# Patient Record
Sex: MALE | Race: WHITE | NOT HISPANIC OR LATINO | Employment: OTHER | URBAN - METROPOLITAN AREA
[De-identification: names, ages, dates, MRNs, and addresses within clinical notes are randomized per-mention and may not be internally consistent; named-entity substitution may affect disease eponyms.]

---

## 2017-02-15 ENCOUNTER — TRANSFERRED RECORDS (OUTPATIENT)
Dept: HEALTH INFORMATION MANAGEMENT | Facility: CLINIC | Age: 62
End: 2017-02-15

## 2017-08-15 ENCOUNTER — TRANSFERRED RECORDS (OUTPATIENT)
Dept: HEALTH INFORMATION MANAGEMENT | Facility: CLINIC | Age: 62
End: 2017-08-15

## 2017-08-17 ENCOUNTER — PRE VISIT (OUTPATIENT)
Dept: OTOLARYNGOLOGY | Facility: CLINIC | Age: 62
End: 2017-08-17

## 2017-08-17 NOTE — TELEPHONE ENCOUNTER
1.  Date/reason for appt: 9/18/17 vocal cord issues  2.  Referring provider: EVANS BARILLAS   3.  Call to patient (Yes / No - short description): no, referral   4.  Previous care at / records requested from: [facility]   5.  Other: Records received from [facility].   Included  Office notes: 12/2/13, 7/1/15, 5/25/16, 2/15/17, 8/15/17  Op/Procedure notes: microlaryngoscopy 11/15/13, 8/28/15  Other: [facility] labs

## 2017-08-18 DIAGNOSIS — E78.5 HYPERLIPIDEMIA LDL GOAL <130: ICD-10-CM

## 2017-08-18 RX ORDER — ATORVASTATIN CALCIUM 20 MG/1
TABLET, FILM COATED ORAL
Qty: 90 TABLET | Refills: 0 | Status: SHIPPED | OUTPATIENT
Start: 2017-08-18 | End: 2018-07-18

## 2017-08-18 NOTE — TELEPHONE ENCOUNTER
Medication Detail      Disp Refills Start End JUAN   atorvastatin (LIPITOR) 20 MG tablet 90 tablet 2 11/28/2016  No   Sig: Take 1 tablet (20 mg) by mouth daily       Last Office Visit with [facility], [facility] or [facility] prescribing provider: 9/8/16  Next 5 appointments (look out 90 days)     Sep 22, 2017  8:00 AM CDT   PHYSICAL with Yoel Amaro MD   [facility]    [address] [place] [id number] [phone]                   Lab Results   Component Value Date    CHOL 156 09/08/2016     Lab Results   Component Value Date    HDL 63 09/08/2016     Lab Results   Component Value Date    LDL 78 09/08/2016     Lab Results   Component Value Date    TRIG 75 09/08/2016     Lab Results   Component Value Date    CHOLHDLRATIO 2.9 11/04/2015

## 2017-08-18 NOTE — TELEPHONE ENCOUNTER
Pt due for cholesterol check. Is scheduled for physical.    Short-term refill sent in to avoid lapse in treatment.    ANGELA SparrowN, RN  [facility] (formerly [facility])

## 2017-09-06 NOTE — TELEPHONE ENCOUNTER
Patient called in, he stated his records are at [facility]. He hasn't had any images taken recently and has had over 60 surgeries for vocal cord. Faxed request to [facility] for any additional records.

## 2017-09-07 NOTE — TELEPHONE ENCOUNTER
Additional Records received from [facility].   Included  Office notes: 10/12/11, 2012, 2013, 2014, 2015  Op/Procedure notes: direct microlaryngoscopy 10/29/03, 11/11/04, 3/29/06  Microlaryngoscopy 9/14/07  Pathology reports: 12/7/07, 8/11/98, 12/1/00,12/13/01, 4/23/03, 10/29/03, 11/11/04, 8/28/15, 9/8/15, 8/21/99

## 2017-09-18 ENCOUNTER — OFFICE VISIT (OUTPATIENT)
Dept: OTOLARYNGOLOGY | Facility: CLINIC | Age: 62
End: 2017-09-18

## 2017-09-18 VITALS — WEIGHT: 199 LBS | HEIGHT: 74 IN | BODY MASS INDEX: 25.54 KG/M2

## 2017-09-18 DIAGNOSIS — Z78.9 RECURRENT RESPIRATORY PAPILLOMATOSIS: Primary | ICD-10-CM

## 2017-09-18 DIAGNOSIS — Z78.9 RECURRENT RESPIRATORY PAPILLOMATOSIS: ICD-10-CM

## 2017-09-18 DIAGNOSIS — Q31.0 GLOTTIC WEB OF LARYNX: ICD-10-CM

## 2017-09-18 DIAGNOSIS — R49.0 DYSPHONIA: ICD-10-CM

## 2017-09-18 DIAGNOSIS — R49.0 DYSPHONIA: Primary | ICD-10-CM

## 2017-09-18 ASSESSMENT — PAIN SCALES - GENERAL: PAINLEVEL: NO PAIN (0)

## 2017-09-18 NOTE — PROGRESS NOTES
Dear Dr. Ronquillo:    I had the pleasure of meeting Cleveland Rodríguez in consultation at the [facility] of the [facility] at your request, for evaluation of chronic cough and throat concerns. The note from our visit follows.  I appreciate the opportunity to participate in the care of this pleasant patient.    Please feel free to contact me with any questions.    Sincerely yours,    Betty Mitchell M.D., M.P.H.  , Laryngology  Director, [facility]  Otolaryngology- Head & Neck Surgery  [phone]          =====    History of Present Illness:   Cleveland Rodríguez is a pleasant 61-year-old male who presents with a history of throat concerns. Symptoms include increased effort to talk/sing, mucus in throat, frequent throat-clearing, frequent cough, poor voice quality, voice breaks, change in vocal pitch, change in vocal volume, change in range and raspy voice.      Voice  He has had this voice problem for 58 years by his report. The voice symptoms are variable and unpredictable, and worse with stress. The voice is getting worse over time. His routine vocal demand is extensive.    He is status post a visit to the operating room for bilateral vocal fold lesion removal in August of 2015 with endoscopic shaver and inferiorly based microflap reconstruction bilaterally. Pathology was negative for high grade dysplasia.  HPV type not known.  He has no prior HPV vaccine.  He has undergone greater than 60 prior procedures in the past. He has previously reported hoarseness and heavy night breathing.  When seen most recently by Dr. Ronquillo, he was noted to have bilateral papilloma as well as small to medium web /scar tissue.  He has had no prior speech therapy.     His current voice quality is as low as it has ever gotten. He describes himself as a social person, and when his voice is bad he talks less. He works as a  rodríguez.      Swallowing  No concerns.      Cough/Throat-clearing  He has also had a chronic cough/throat clear over the past six months or so.  This is unpredictable and feels 0% controllable. Triggers include talking, laughing, eating and lying down.  There is no preceding tickle.  Most of the time it is a dry cough.  He finds the symptoms annoying. He is taking no new medications associated with cough onset.      Breathing  he has had some breathing problems during sleep for the past six years or so.  He attributes this to the papilloma as well, and notes that it occurs without exertion.   He notes expiratory noise, but no stridor.    He had a sleep study two years ago, which was borderline negative by his report.  His wife reports he is a very loud sleeper, although she does not hear pauses.      Throat discomfort  No concerns.       Reflux-type symptoms  He experiences heartburn/indigestion weekly to monthly. He is not taking reflux medications.      Prior outside records were reviewed for this visit. He also has a history of uveitis which has been managed with local steroids. He has had some chest pain in the past that he states is not heartburn. I has been evaluated in the past and was not felt to be cardiac.    MEDICATIONS:     Current Outpatient Prescriptions   Medication Sig Dispense Refill     atorvastatin (LIPITOR) 20 MG tablet TAKE 1 TABLET BY MOUTH DAILY 90 tablet 0     aspirin 81 MG tablet Take 1 tablet by mouth daily        metroNIDAZOLE (METROGEL) 1 % gel Apply 1 g topically daily apply hs for Rosacea 60 g 11     Multiple Vitamin (DAILY MULTIVITAMIN PO) Take  by mouth 2 times daily.         ALLERGIES:    Allergies   Allergen Reactions     Amoxicillin      Rash         PAST MEDICAL HISTORY:   Past Medical History:   Diagnosis Date     Other and unspecified hyperlipidemia      Polyp of vocal cord or larynx      Rosacea      Uveitis         PAST SURGICAL HISTORY:   Past Surgical History:   Procedure  Laterality Date     CATARACT IOL, RT/LT      right eye only     COLONOSCOPY  11/4/2013    Procedure: COLONOSCOPY;  COLONOSCOPY;  Surgeon: Sawyer Niño MD;  Location:  GI     SURGICAL HISTORY OF -       about 55 vocal cord surgeries       HABITS/SOCIAL HISTORY:    Social History   Substance Use Topics     Smoking status: Never Smoker     Smokeless tobacco: Never Used     Alcohol use Yes      Comment: social         FAMILY HISTORY:    Family History   Problem Relation Age of Onset     HEART DISEASE Father 46       REVIEW OF SYSTEMS:  The patient completed a comprehensive 11 point review of systems (below), which was reviewed. Positives are as noted below; pertinent findings are as noted in the HPI.     Patient Supplied Answers to Review of Systems  No flowsheet data found.    PHYSICAL EXAMINATION:  General: The patient was alert and conversant, and in no acute distress. Patient accompanied by his spouse.  Eyes: PERRL, conjunctiva and lids normal, sclera nonicteric.  Nose: Anterior rhinoscopy: no gross abnormalities. no  bleeding; no  mucopurulence; septum grossly normal, mild mucoid drainage and/or crusting.  Oral cavity/oropharynx: No masses or lesions. Dentition in fair condition. Floor of mouth and oral tongue soft to palpation. Tongue mobility and palate elevation intact and symmetric.  Ears: Normal auricles, external auditory canals bilaterally. Visualized portions of tympanic membranes normal bilaterally. Bilateral moderate cerumen.  Neck: No palpable cervical lymphadenopathy. There was no significant tenderness to palpation of the thyrohyoid space, which was mildly narrow. No obvious thyroid abnormality. Landmarks palpable.  Resp: Breathing comfortably, no stridor or stertor.  Neuro: Symmetric facial function. Other cranial nerves as documented above.  Psych: Normal affect, pleasant and cooperative.  Voice/speech: Moderate dysphonia characterized by breathiness, roughness and strain.  Extremities: No  cyanosis, clubbing, or edema of the upper extremities.      Intake scores  Total Score for Last Patient-Answered VHI Questionnaire  VHI Total Score 9/18/2017   VHI Total Score 25     Total Score for Last Patient-Answered EAT Questionnaire  No flowsheet data found.    Total Score for Last Patient-Answered CSI Questionnaire  No flowsheet data found.      PROCEDURE:   Flexible fiberoptic laryngoscopy and laryngovideostroboscopy  Indications: This procedure was warranted to evaluate the patient's laryngeal anatomy and function. Risks, benefits, and alternatives were discussed.  Description: After written informed consent was obtained, a time-out was performed to confirm patient identity, procedure, and procedure site. Topical 3% lidocaine with 0.25% phenylephrine was applied to the nasal cavities. I performed the endoscopy and no complications were apparent. Continuous and stroboscopic light were utilized to assess routine phonation and variable frequency phonation.  Performed by: Betty Mitchell MD MPH  SLP: Ermias Meza MM, MA, CCC-SLP   Findings: Normal nasopharynx. Normal base of tongue, valleculae, and epiglottis. Vocal fold mobility: right: normal; left: normal. Medial edges of the vocal folds: left with diffuse clusters of papilloma along false and true vocal folds; right with superior/lateral papilloma, medial edge appeared clear. Small anterior infraglottic web.   Glissade produced appropriate elongation. There was moderate supraglottic recruitment with connected speech. Mucosa of false vocal folds, aryepiglottic folds, piriform sinuses, and posterior glottis unremarkable with the exception of scattered clusters of papillomas particularly on the false vocal folds. Mild right posterior glottic web. Airway was patent.   The addition of stroboscopy allowed evaluation of the mucosal wave.   Amplitude: right: severely decreased; left: mildly decreased. Symmetry: associated asymmetry. Closure pattern: complete  "and irregular. Closure plane: at glottic level. Phase distribution: normal.      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 We had a lengthy discussion today. We discussed that while in-clinic KTP laser treatment can be an option, his burden of disease is significant enough that he would benefit from formal treatment in the OR; at times these procedures do need to be staged. We also discussed that there is some evidence in the literature potentially supporting the utility of HPV vaccination in adults who already have RRP. The data are preliminary and the included number of patients is small, but the vaccine appears to be well tolerated and may be associated with longer intervals between procedures. He will look into this and let us know if he needs a letter of support.    He asked that I speak with Dr Ronquillo to clarify whether he should have surgery with me or continue with Dr Ronquillo. He wanted me to be sure to convey to him that he is very loyal to Dr Ronquillo. I mentioned that Dr Ronquillo has clearly done very well for him over the years, and I am also happy to help if he deems appropriate.    He will return as needed. I appreciate the opportunity to participate in the care of this patient.    "

## 2017-09-18 NOTE — PROGRESS NOTES
"[facility]  Bhavin Harvey Jr., M.D., F.A.C.S.  Betty Mitchell M.D., M.P.H.  Magy Guthrie, Ph.D., CCC/SLP  Nelli Mccloud M.M. (voice), M.JAYLEN., CCC/SLP  Segundo Meza M.M. (voice), MASHLEY., CCC/SLP    Patient: Cleveland Rodríguez  Date of Visit: 9/18/2017    CHIEF COMPLAINT: Voice changes, dyspnea    HISTORY  Cleveland Rodríguez was seen for initial voice evaluation and laryngeal examination in conjunction with a visit to Dr. Mitchell.  Please refer to Dr. Mitchell s dictation for a more complete history and impressions. Salient details of his history are as follows:    Mr. Rodríguez is a 61 year old gentleman with a longstanding history of recurrent respiratory papilloma, for which he estimates that he has had 60+ surgeries since the age of 3.    He has been working with Dr. Ronquillo and [facility] for quite some time, and has undergone several surgeries with good results. However, given the complexity and longstanding nature of his RRP Dr. Ronquillo encouraged the patient to come to our clinic as a second opinion on management.     Of note he has never worked with a speech pathologist on his voice prior to or following any surgery in the past    CURRENT SYMPTOMS INCLUDE  VOICE    Increased effort    Increased mucus in throat    Poor voice quality    Change in pitch    Reduced projection    Describes voice as \"raspy\"    COUGH/THROAT CLEARING    Onset ~6 months with no inciting incident or changes in medication    Frequent cough and throat clearing     Dry / minimally productive    Waxes and wanes    his cough/ throat clearing triggers include:  o Talking  o Laughing  o Eating  o Lying down    BREATHING    Worse when sleeping    Wife describes significant rattling noise, but no apneic periods    Sleep Study 2-3 years previous showed borderline apnea    Patient denies significant pain, voice, or heartburn.     OTHER PERTINENT HISTORY    Complex medical history: please also refer to Dr. Mitchell's dictation.   Past " "Medical History:   Diagnosis Date     Other and unspecified hyperlipidemia      Polyp of vocal cord or larynx      Rosacea      Uveitis      Past Surgical History:   Procedure Laterality Date     CATARACT IOL, RT/LT      right eye only     COLONOSCOPY  11/4/2013    Procedure: COLONOSCOPY;  COLONOSCOPY;  Surgeon: Sawyer Niño MD;  Location:  GI     SURGICAL HISTORY OF -       about 55 vocal cord surgeries       OBJECTIVE  PATIENT REPORTED MEASURES    Effort to talk: 8 / 10 (0-10 in which 10 represents maximal effort)    Voice quality: 2 / 10 (0-10 in which 10 represents best possible voice)     VPC mean: 1.5 /4 (  Patient Supplied Answers To VHI Questionnaire  Voice Handicap Index (VHI-10) 9/18/2017   How often do you have any of the following symptoms:  Indigestion, heartburn, chest pain, stomach acid coming up, and/or tasting acid in your mouth or throat? Monthly   (F1) My voice makes it difficult for people to hear me. Almost always   (F2) People have difficulty understanding me in a noisy room. Almost always   (F8) My voice difficulties restrict my personal and social life. Sometimes   (F9) I feel left out of conversations because of my voice. Sometimes   (F10) My voice problem causes me to lose income. Sometimes   (P5) I feel as though I have to strain to produce voice. Sometimes   (P6) The clarity of my voice is unpredictable. Almost always   (E4) My voice problem upsets me. Always   (E6) My voice makes me feel handicapped. Sometimes   (P3) People ask, \"What's wrong with your voice?\" Sometimes   VHI Total Score 25     PERCEPTUAL EVALUATION (CPT 90588)  POSTURE / TENSION:     no overt tension visible    BREATHING:     shallow    phonation is not coordinated with respiration     No respiratory noise noted    LARYNGEAL PALPATION:     no significant tenderness    VOICE:    Roughness: Moderate to severe    Breathiness: Mild Consistent    Strain: Mild to moderate Intermittent    Loudness    Conversational " speech:  Mildly reduced    Projected speech:  Moderate to severely reduced    Pitch:    Conversational speech:  Mildly lowered    Pitch glide: patient demonstrated significant difficulty effecting any volitional pitch change beyond a range of 4 semitones    Resonance:    Conversational speech:  laryngeal pharyngeal resonance    Singing vs. Speech:  Sustained phonation demonstrated reduced strain and mildly reduced roughness with mild increase in breathiness    CAPE-V Overall Severity:  63/100    COUGH/THROAT CLEARING:    Not observed    THERAPY PROBES: Improvement was elicited with coordination of respiration and phonation    LARYNGEAL EXAMINATION  Procedure: Flexible endoscopy with chip-tip technology with stroboscopy, left nostril; topical anesthesia with 3% Lidocaine and 0.25% phenylephrine was applied.   Performed by: Dr. Mitchell   The laryngeal and pharyngeal structures were evaluated for gross appearance, mobility, function, and focal lesions / abnormalities of the associated mucosa.  Stroboscopy was warranted to evaluate closure, symmetry, and vibratory characteristics of the vocal folds.  All findings were within normal limits with the exception of the following salient features:     Significant presence of papilloma completely obscuring the right true vocal fold and extending supraglotticly.     The posterior portion of the left membranous vocal fold is WNL, with a small anterior scar band, and cluster of papilloma on the left vocal process.    There is significant posterior commissure scarring with some evidence of papilloma recurrence subglottally    Marked 4 way constrictive hyperfunction noted during running speech which reduced with improved respiratory phonatory coordination.    Minimal elongation of the vocal folds was noted during pitch glide, but it is unclear if this is the result of limitations in physiology or limited experience with range exploration.    Stroboscopy demonstrated minimal  vibration of the right vocal fold, and mild to moderately reduced vibratory characteristics of the residual normal tissue of the LVF.      NBI of vocal folds    The laryngeal exam was reviewed with Mr. Rodríguez, and I provided pertinent explanations, as well as written and oral information.    ASSESSMENT / PLAN  IMPRESSIONS: Mr. Rodríguez has significant R49.0 (Dysphonia) in the context of longstanding Z78.9 (Recurrent Respiratory Papillomatosis) and an imbalance in function of the intrinsic and extrinsic muscles of the larynx.      Laryngeal evaluation demonstrated    Significant presence of papilloma completely obscuring the right true vocal fold and extending supraglotticly.     The posterior portion of the left membranous vocal fold is WNL, with a small anterior scar band, and cluster of papilloma on the left vocal process.    There is significant posterior commissure scarring with some evidence of papilloma recurrence subglottally    Marked 4 way constrictive hyperfunction noted during running speech which reduced with improved respiratory phonatory coordination.    Minimal elongation of the vocal folds was noted during pitch glide, but it is unclear if this is the result of limitations in physiology or limited experience with range exploration.    Dysphonia is accounted for by the stiffness of the vocal folds, and resulting poor mucosal wave     Dysphonia/discomfort is compounded by the hyperfunction of the intrinsic and extrinsic laryngeal musculature  in combination with poor phonatory respiratory coordination.  STIMULABILITY: results of therapy probes during perceptual and laryngeal evaluation demonstrate improvement with coordination of respiration and phonation  RECOMMENDATIONS:     A braxton discussion regarding options for intervention was had between surgeon, SLP, and patient / family.  The patient has elected to pursue OR excision of papilloma, and will consider the use of HPV vaccine.    A course of  speech therapy is recommended to optimize vocal technique, improve voice quality, promote reduced discomfort, effort and fatigue and optimize surgical results.    He demonstrates a Good prognosis for improved benefit from surgical intervention given adherence to therapeutic recommendations.     DURATION / FREQUENCY: one pre-operative one-hour sessions with four bi-weekly post-operative one-hour sessions, and 2 monthly one-hour follow-up sessions    GOALS:  Patient goal:   1. To improve and maintain a healthy voice quality  2. To understand the problem and fix it as much as possible    Short-term goal(s): Within the first 4 sessions, Mr. Rodríguez:  1. will demonstrate improved awareness of throat clearing / cough: acknowledging >75% of all cough events during session time with no clinician support  2. will be able to independently list key factors in maintenance of good vocal hygiene with 80% accuracy, and report on their use outside the therapy room.  3. will utilize silent inhalation with good low-respiratory engagement 75% of the time during therapy tasks with minimal clinician support  4. will accurately identify target vs. habitual voice quality during therapy tasks in 4 out of 5 trials with no clinician support  5. will demonstrate the ability to alternate between target and habitual voice quality given clinician cue 75% of the time during therapy tasks    Long-term goal(s): In 6 months, Mr. Rodríguez will:  1. Report a week of typical activities, in which dysphonia does not exceed a level of 3 out of 10, 80% of the time    This treatment plan was developed with the patient who agreed with the recommendations.    PRIMARY ICD-10 code:  R49.0 (Dysphonia)  SECONDARY ICD-10 code:  Z78.1 (Recurrent Respiratory Papillomatosis)     TOTAL SERVICE TIME: 60 minutes  EVALUATION OF VOICE AND RESONANCE: (95458): 60 minutes    NO CHARGE FACILITY FEE (59608)    Ermias Meza M.M., M.A., CCC-SLP  Speech-Language  Pathologist  Certificate of Vocology  [phone]

## 2017-09-18 NOTE — MR AVS SNAPSHOT
After Visit Summary   9/18/2017    Cleveland Rodríguez    MRN: [id number]           Patient Information     Date Of Birth          1955        Visit Information        Provider Department      9/18/2017 10:10 AM Betty Mitchell MD [facility]        Today's Diagnoses     Recurrent respiratory papillomatosis    -  1    Glottic web of larynx        Dysphonia           Follow-ups after your visit        Additional Services     OTOLARYNGOLOGY REFERRAL       SPEECH-LANGUAGE PATHOLOGY SERVICE(S) REQUESTED:  Evaluate and treat    Magy Guthrie, Ph.D., CCC/SLP  Speech-Language Pathologist  Director, [facility]  [phone]    Nelli Mccloud M.M., M.A., CCC/SLP  Speech-Language Pathologist  [facility]  [phone]                  Your next 10 appointments already scheduled     Oct 03, 2017  6:20 PM CDT   Pre-Op physical with Yoel Amaro MD   [facility]    [address] [place] [id number]   [phone]            Oct 06, 2017  8:00 AM CDT   PHYSICAL with Yoel Amaro MD   [facility] ([facility])    [address] [place] [id number]   [phone]            Oct 20, 2017  2:00 PM CDT   (Arrive by 1:45 PM)   Return Visit with JULISSA Morales   [facility] ([facility])    [address] [place] [id number] [phone]            Oct 24, 2017  5:00 PM CDT   (Arrive by 4:45 PM)   PAC PHONE RN ASSESSMENT with  Pac Rn   [facility] ([facility])    [address] [place] [id number] [phone]           Note: this is not an onsite visit; there is no need to come to the facility.            Oct 26, 2017   Procedure with Betty Mitchell MD   [facility] ([facility])    [address] [place] [address]  [address] [facility] [id number] [phone]           Located in the [facility] at [address] [place] [address], [place] [address].   parking is very convenient and highly recommended.  is a $6 flat rate fee.  Both  and self parkers should enter the main arrival plaza from [facility]; parking attendants will direct you based on your parking preference.            Nov 13, 2017  1:20 PM CST   (Arrive by 1:05 PM)   Return Visit with Betty Mitchell MD   [facility] ([facility] [place])    [address] [place] [address]  [address] [facility] [id number] [phone]           - This is a multidisciplinary care team visit with an ENT physician and may include a speech language pathologist. - It is important to know that if you are evaluated and/or treated by both a physician and a speech pathologist during your visit, your billing will reflect the input that you receive from both providers as separate professionals. Although most insurance plans do cover these services, we encourage you to contact your insurance company prior to your visit to determine whether there are any coverage limitations that might affect you financially. - Billing/procedure codes that are frequently associated with visits to our clinic include (but are not limited to) the ones listed below. Most patients will not need all of these items, but it may be useful to ask your insurance company's patient . 04956: Flexible fiberoptic laryngoscopy, 28732: Laryngoscopy; flexible or rigid fiberoptic, with stroboscopy, 98246: Flexible endoscopic evaluation of swallowing, 85933: Evaluation of Voice and Resonance, 92890: Speech pathology treatment for voice, speech, communication, 66173: Speech pathology treatment for swallowing/oral function for feeding - If you have any concerns or questions, or if you would prefer not to  "have a speech pathologist involved in your visit, please contact our Clinic Coordinator at [phone], at least 24 hours prior to your appointment.              Who to contact     Please call your clinic at [phone] to:    Ask questions about your health    Make or cancel appointments    Discuss your medicines    Learn about your test results    Speak to your doctor   If you have compliments or concerns about an experience at your clinic, or if you wish to file a complaint, please contact [facility] Patient Relations at [phone] or email us at [email or web address]         Additional Information About Your Visit        GnuBIOhart Information     Zuujit is an electronic gateway that provides easy, online access to your medical records. With Zuujit, you can request a clinic appointment, read your test results, renew a prescription or communicate with your care team.     To sign up for Zuujit visit the website at [email or web address]   You will be asked to enter the access code listed below, as well as some personal information. Please follow the directions to create your username and password.     Your access code is: [email or web address]  Expires: 12/3/2017  6:30 AM     Your access code will  in 90 days. If you need help or a new code, please contact your [facility] or call [phone] for assistance.        Care EveryWhere ID     This is your Care EveryWhere ID. This could be used by other organizations to access your [facility] medical records  [id number]        Your Vitals Were     Height BMI (Body Mass Index)                1.88 m (6' 2\") 25.55 kg/m2           Blood Pressure from Last 3 Encounters:   16 104/66   11/17/15 98/56   07/22/15 100/60    Weight from Last 3 Encounters:   17 90.3 kg (199 lb)   16 90.3 kg (199 lb)   11/17/15 96.6 kg (213 lb)              We Performed the Following     IMAGESTREAM " RECORDING ORDER     LARYNGOSCOPY FLEX/RIGID W STROBOSCOPY     OTOLARYNGOLOGY REFERRAL     Mounika-Operative Worksheet        Primary Care Provider Office Phone # Fax #    Yoel Amaro [phone] [address]  [facility] [address]        Equal Access to Services     CHARLOTTE BRUNER : Hadii aad ku hadasho Soomaali, waaxda luqadaha, qaybta kaalmada adeegyada, waxay idiin hayaan adeeg khcristela la'mary janen ah. So [facility] [phone].    ATENCIÓN: Si habla español, tiene a boles disposición servicios gratuitos de asistencia lingüística. [facility] [phone].    We comply with applicable federal civil rights laws and [place] laws. We do not discriminate on the basis of race, color, national origin, age, disability, sex, sexual orientation, or gender identity.            Thank you!     Thank you for choosing [facility]  for your care. Our goal is always to provide you with excellent care. Hearing back from our patients is one way we can continue to improve our services. Please take a few minutes to complete the written survey that you may receive in the mail after your visit with us. Thank you!             Your Updated Medication List - Protect others around you: Learn how to safely use, store and throw away your medicines at www.disposemymeds.org.          This list is accurate as of: 9/18/17 11:59 PM.  Always use your most recent med list.                   Brand Name Dispense Instructions for use Diagnosis    aspirin 81 MG tablet      Take 1 tablet by mouth daily        atorvastatin 20 MG tablet    LIPITOR    90 tablet    TAKE 1 TABLET BY MOUTH DAILY    Hyperlipidemia LDL goal <130       DAILY MULTIVITAMIN PO      Take  by mouth 2 times daily.    Routine general medical examination at a health care facility, Polyp of vocal cord or larynx, Degeneration of lumbar or lumbosacral intervertebral disc, Iritis, Hyperlipidemia LDL goal <130       metroNIDAZOLE 1 % gel    METROGEL    60 g    Apply 1 g  topically daily apply hs for Rosacea    Rosacea

## 2017-09-18 NOTE — LETTER
"9/18/2017       RE: Cleveland Rodríguez  [address] [place] [address]     Dear Colleague,    Thank you for referring your patient, Cleveland Rodríguez, to the [facility]. Please see a copy of my visit note below.    [facility]  Bhavin Harvey Jr., M.D., F.A.C.S.  Betty Mitchell M.D., M.P.H.  Magy Guthrie, Ph.D., CCC/SLP  Nelli Mccloud M.M. (voice), M.A., CCC/SLP  Segundo Meza M.M. (voice), M.A., CCC/SLP    Patient: Cleveland Rodríguez  Date of Visit: 9/18/2017    CHIEF COMPLAINT: Voice changes, dyspnea    HISTORY  Cleveland Rodríguez was seen for initial voice evaluation and laryngeal examination in conjunction with a visit to Dr. Mitchell.  Please refer to Dr. Mitchell s dictation for a more complete history and impressions. Salient details of his history are as follows:    Mr. Rodríguez is a 61 year old gentleman with a longstanding history of recurrent respiratory papilloma, for which he estimates that he has had 60+ surgeries since the age of 3.    He has been working with Dr. Ronquillo and [facility] for quite some time, and has undergone several surgeries with good results. However, given the complexity and longstanding nature of his RRP Dr. Ronquillo encouraged the patient to come to our clinic as a second opinion on management.     Of note he has never worked with a speech pathologist on his voice prior to or following any surgery in the past    CURRENT SYMPTOMS INCLUDE  VOICE    Increased effort    Increased mucus in throat    Poor voice quality    Change in pitch    Reduced projection    Describes voice as \"raspy\"    COUGH/THROAT CLEARING    Onset ~6 months with no inciting incident or changes in medication    Frequent cough and throat clearing     Dry / minimally productive    Waxes and wanes    his cough/ throat clearing triggers include:  o Talking  o Laughing  o Eating  o Lying down    BREATHING    Worse when sleeping    Wife describes " "significant rattling noise, but no apneic periods    Sleep Study 2-3 years previous showed borderline apnea    Patient denies significant pain, voice, or heartburn.     OTHER PERTINENT HISTORY    Complex medical history: please also refer to Dr. Mitchell's dictation.   Past Medical History:   Diagnosis Date     Other and unspecified hyperlipidemia      Polyp of vocal cord or larynx      Rosacea      Uveitis      Past Surgical History:   Procedure Laterality Date     CATARACT IOL, RT/LT      right eye only     COLONOSCOPY  11/4/2013    Procedure: COLONOSCOPY;  COLONOSCOPY;  Surgeon: Sawyer Niño MD;  Location:  GI     SURGICAL HISTORY OF -       about 55 vocal cord surgeries       OBJECTIVE  PATIENT REPORTED MEASURES    Effort to talk: 8 / 10 (0-10 in which 10 represents maximal effort)    Voice quality: 2 / 10 (0-10 in which 10 represents best possible voice)     VPC mean: 1.5 /4 (  Patient Supplied Answers To VHI Questionnaire  Voice Handicap Index (VHI-10) 9/18/2017   How often do you have any of the following symptoms:  Indigestion, heartburn, chest pain, stomach acid coming up, and/or tasting acid in your mouth or throat? Monthly   (F1) My voice makes it difficult for people to hear me. Almost always   (F2) People have difficulty understanding me in a noisy room. Almost always   (F8) My voice difficulties restrict my personal and social life. Sometimes   (F9) I feel left out of conversations because of my voice. Sometimes   (F10) My voice problem causes me to lose income. Sometimes   (P5) I feel as though I have to strain to produce voice. Sometimes   (P6) The clarity of my voice is unpredictable. Almost always   (E4) My voice problem upsets me. Always   (E6) My voice makes me feel handicapped. Sometimes   (P3) People ask, \"What's wrong with your voice?\" Sometimes   VHI Total Score 25     PERCEPTUAL EVALUATION (CPT 50371)  POSTURE / TENSION:     no overt tension visible    BREATHING: "     shallow    phonation is not coordinated with respiration     No respiratory noise noted    LARYNGEAL PALPATION:     no significant tenderness    VOICE:    Roughness: Moderate to severe    Breathiness: Mild Consistent    Strain: Mild to moderate Intermittent    Loudness    Conversational speech:  Mildly reduced    Projected speech:  Moderate to severely reduced    Pitch:    Conversational speech:  Mildly lowered    Pitch glide: patient demonstrated significant difficulty effecting any volitional pitch change beyond a range of 4 semitones    Resonance:    Conversational speech:  laryngeal pharyngeal resonance    Singing vs. Speech:  Sustained phonation demonstrated reduced strain and mildly reduced roughness with mild increase in breathiness    CAPE-V Overall Severity:  63/100    COUGH/THROAT CLEARING:    Not observed    THERAPY PROBES: Improvement was elicited with coordination of respiration and phonation    LARYNGEAL EXAMINATION  Procedure: Flexible endoscopy with chip-tip technology with stroboscopy, left nostril; topical anesthesia with 3% Lidocaine and 0.25% phenylephrine was applied.   Performed by: Dr. Mitchell   The laryngeal and pharyngeal structures were evaluated for gross appearance, mobility, function, and focal lesions / abnormalities of the associated mucosa.  Stroboscopy was warranted to evaluate closure, symmetry, and vibratory characteristics of the vocal folds.  All findings were within normal limits with the exception of the following salient features:     Significant presence of papilloma completely obscuring the right true vocal fold and extending supraglotticly.     The posterior portion of the left membranous vocal fold is WNL, with a small anterior scar band, and cluster of papilloma on the left vocal process.    There is significant posterior commissure scarring with some evidence of papilloma recurrence subglottally    Marked 4 way constrictive hyperfunction noted during running speech  which reduced with improved respiratory phonatory coordination.    Minimal elongation of the vocal folds was noted during pitch glide, but it is unclear if this is the result of limitations in physiology or limited experience with range exploration.    Stroboscopy demonstrated minimal vibration of the right vocal fold, and mild to moderately reduced vibratory characteristics of the residual normal tissue of the LVF.      NBI of vocal folds    The laryngeal exam was reviewed with Mr. Rodríguez, and I provided pertinent explanations, as well as written and oral information.    ASSESSMENT / PLAN  IMPRESSIONS: Mr. Rodríguez has significant R49.0 (Dysphonia) in the context of longstanding Z78.9 (Recurrent Respiratory Papillomatosis) and an imbalance in function of the intrinsic and extrinsic muscles of the larynx.      Laryngeal evaluation demonstrated    Significant presence of papilloma completely obscuring the right true vocal fold and extending supraglotticly.     The posterior portion of the left membranous vocal fold is WNL, with a small anterior scar band, and cluster of papilloma on the left vocal process.    There is significant posterior commissure scarring with some evidence of papilloma recurrence subglottally    Marked 4 way constrictive hyperfunction noted during running speech which reduced with improved respiratory phonatory coordination.    Minimal elongation of the vocal folds was noted during pitch glide, but it is unclear if this is the result of limitations in physiology or limited experience with range exploration.    Dysphonia is accounted for by the stiffness of the vocal folds, and resulting poor mucosal wave     Dysphonia/discomfort is compounded by the hyperfunction of the intrinsic and extrinsic laryngeal musculature  in combination with poor phonatory respiratory coordination.  STIMULABILITY: results of therapy probes during perceptual and laryngeal evaluation demonstrate improvement with  coordination of respiration and phonation  RECOMMENDATIONS:     A braxton discussion regarding options for intervention was had between surgeon, SLP, and patient / family.  The patient has elected to pursue OR excision of papilloma, and will consider the use of HPV vaccine.    A course of speech therapy is recommended to optimize vocal technique, improve voice quality, promote reduced discomfort, effort and fatigue and optimize surgical results.    He demonstrates a Good prognosis for improved benefit from surgical intervention given adherence to therapeutic recommendations.     DURATION / FREQUENCY: one pre-operative one-hour sessions with four bi-weekly post-operative one-hour sessions, and 2 monthly one-hour follow-up sessions    GOALS:  Patient goal:   1. To improve and maintain a healthy voice quality  2. To understand the problem and fix it as much as possible    Short-term goal(s): Within the first 4 sessions, Mr. Rodríguez:  1. will demonstrate improved awareness of throat clearing / cough: acknowledging >75% of all cough events during session time with no clinician support  2. will be able to independently list key factors in maintenance of good vocal hygiene with 80% accuracy, and report on their use outside the therapy room.  3. will utilize silent inhalation with good low-respiratory engagement 75% of the time during therapy tasks with minimal clinician support  4. will accurately identify target vs. habitual voice quality during therapy tasks in 4 out of 5 trials with no clinician support  5. will demonstrate the ability to alternate between target and habitual voice quality given clinician cue 75% of the time during therapy tasks    Long-term goal(s): In 6 months, Mr. Rodríguez will:  1. Report a week of typical activities, in which dysphonia does not exceed a level of 3 out of 10, 80% of the time    This treatment plan was developed with the patient who agreed with the recommendations.    PRIMARY ICD-10  code:  R49.0 (Dysphonia)  SECONDARY ICD-10 code:  Z78.1 (Recurrent Respiratory Papillomatosis)     TOTAL SERVICE TIME: 60 minutes  EVALUATION OF VOICE AND RESONANCE: (41616): 60 minutes    NO CHARGE FACILITY FEE (78828)    Ermias Meza M.M., M.A., CCC-SLP  Speech-Language Pathologist  Certificate of Vocology  [phone]

## 2017-09-18 NOTE — LETTER
9/18/2017      RE: Cleveland Rodríguez  [address] [place] [address]       Dear Dr. Ronquillo:    I had the pleasure of meeting Cleveland Rodríguez in consultation at the [facility] of the [facility] at your request, for evaluation of chronic cough and throat concerns. The note from our visit follows.  I appreciate the opportunity to participate in the care of this pleasant patient.    Please feel free to contact me with any questions.    Sincerely yours,    Betty Mitchell M.D., M.P.H.  , Laryngology  Director, [facility]  Otolaryngology- Head & Neck Surgery  [phone]          =====    History of Present Illness:   Cleveland Rodríguez is a pleasant 61-year-old male who presents with a history of throat concerns. Symptoms include increased effort to talk/sing, mucus in throat, frequent throat-clearing, frequent cough, poor voice quality, voice breaks, change in vocal pitch, change in vocal volume, change in range and raspy voice.      Voice  He has had this voice problem for 58 years by his report. The voice symptoms are variable and unpredictable, and worse with stress. The voice is getting worse over time. His routine vocal demand is extensive.    He is status post a visit to the operating room for bilateral vocal fold lesion removal in August of 2015 with endoscopic shaver and inferiorly based microflap reconstruction bilaterally. Pathology was negative for high grade dysplasia.  HPV type not known.  He has no prior HPV vaccine.  He has undergone greater than 60 prior procedures in the past. He has previously reported hoarseness and heavy night breathing.  When seen most recently by Dr. Ronquillo, he was noted to have bilateral papilloma as well as small to medium web /scar tissue.  He has had no prior speech therapy.     His current voice quality is as low as it has ever gotten. He describes himself as a social  person, and when his voice is bad he talks less. He works as a rodríguez.      Swallowing  No concerns.      Cough/Throat-clearing  He has also had a chronic cough/throat clear over the past six months or so.  This is unpredictable and feels 0% controllable. Triggers include talking, laughing, eating and lying down.  There is no preceding tickle.  Most of the time it is a dry cough.  He finds the symptoms annoying. He is taking no new medications associated with cough onset.      Breathing  he has had some breathing problems during sleep for the past six years or so.  He attributes this to the papilloma as well, and notes that it occurs without exertion.   He notes expiratory noise, but no stridor.    He had a sleep study two years ago, which was borderline negative by his report.  His wife reports he is a very loud sleeper, although she does not hear pauses.      Throat discomfort  No concerns.       Reflux-type symptoms  He experiences heartburn/indigestion weekly to monthly. He is not taking reflux medications.      Prior outside records were reviewed for this visit. He also has a history of uveitis which has been managed with local steroids. He has had some chest pain in the past that he states is not heartburn. I has been evaluated in the past and was not felt to be cardiac.    MEDICATIONS:     Current Outpatient Prescriptions   Medication Sig Dispense Refill     atorvastatin (LIPITOR) 20 MG tablet TAKE 1 TABLET BY MOUTH DAILY 90 tablet 0     aspirin 81 MG tablet Take 1 tablet by mouth daily        metroNIDAZOLE (METROGEL) 1 % gel Apply 1 g topically daily apply hs for Rosacea 60 g 11     Multiple Vitamin (DAILY MULTIVITAMIN PO) Take  by mouth 2 times daily.         ALLERGIES:    Allergies   Allergen Reactions     Amoxicillin      Rash         PAST MEDICAL HISTORY:   Past Medical History:   Diagnosis Date     Other and unspecified hyperlipidemia      Polyp of vocal cord or larynx      Rosacea      Uveitis          PAST SURGICAL HISTORY:   Past Surgical History:   Procedure Laterality Date     CATARACT IOL, RT/LT      right eye only     COLONOSCOPY  11/4/2013    Procedure: COLONOSCOPY;  COLONOSCOPY;  Surgeon: Sawyer Niño MD;  Location:  GI     SURGICAL HISTORY OF -       about 55 vocal cord surgeries       HABITS/SOCIAL HISTORY:    Social History   Substance Use Topics     Smoking status: Never Smoker     Smokeless tobacco: Never Used     Alcohol use Yes      Comment: social         FAMILY HISTORY:    Family History   Problem Relation Age of Onset     HEART DISEASE Father 46       REVIEW OF SYSTEMS:  The patient completed a comprehensive 11 point review of systems (below), which was reviewed. Positives are as noted below; pertinent findings are as noted in the HPI.     Patient Supplied Answers to Review of Systems  No flowsheet data found.    PHYSICAL EXAMINATION:  General: The patient was alert and conversant, and in no acute distress. Patient accompanied by his spouse.  Eyes: PERRL, conjunctiva and lids normal, sclera nonicteric.  Nose: Anterior rhinoscopy: no gross abnormalities. no  bleeding; no  mucopurulence; septum grossly normal, mild mucoid drainage and/or crusting.  Oral cavity/oropharynx: No masses or lesions. Dentition in fair condition. Floor of mouth and oral tongue soft to palpation. Tongue mobility and palate elevation intact and symmetric.  Ears: Normal auricles, external auditory canals bilaterally. Visualized portions of tympanic membranes normal bilaterally. Bilateral moderate cerumen.  Neck: No palpable cervical lymphadenopathy. There was no significant tenderness to palpation of the thyrohyoid space, which was mildly narrow. No obvious thyroid abnormality. Landmarks palpable.  Resp: Breathing comfortably, no stridor or stertor.  Neuro: Symmetric facial function. Other cranial nerves as documented above.  Psych: Normal affect, pleasant and cooperative.  Voice/speech: Moderate dysphonia  characterized by breathiness, roughness and strain.  Extremities: No cyanosis, clubbing, or edema of the upper extremities.      Intake scores  Total Score for Last Patient-Answered VHI Questionnaire  VHI Total Score 9/18/2017   VHI Total Score 25     Total Score for Last Patient-Answered EAT Questionnaire  No flowsheet data found.    Total Score for Last Patient-Answered CSI Questionnaire  No flowsheet data found.      PROCEDURE:   Flexible fiberoptic laryngoscopy and laryngovideostroboscopy  Indications: This procedure was warranted to evaluate the patient's laryngeal anatomy and function. Risks, benefits, and alternatives were discussed.  Description: After written informed consent was obtained, a time-out was performed to confirm patient identity, procedure, and procedure site. Topical 3% lidocaine with 0.25% phenylephrine was applied to the nasal cavities. I performed the endoscopy and no complications were apparent. Continuous and stroboscopic light were utilized to assess routine phonation and variable frequency phonation.  Performed by: Betty Mitchell MD MPH  SLP: Ermias Meza MM, MA, CCC-SLP   Findings: Normal nasopharynx. Normal base of tongue, valleculae, and epiglottis. Vocal fold mobility: right: normal; left: normal. Medial edges of the vocal folds: left with diffuse clusters of papilloma along false and true vocal folds; right with superior/lateral papilloma, medial edge appeared clear. Small anterior infraglottic web.   Glissade produced appropriate elongation. There was moderate supraglottic recruitment with connected speech. Mucosa of false vocal folds, aryepiglottic folds, piriform sinuses, and posterior glottis unremarkable with the exception of scattered clusters of papillomas particularly on the false vocal folds. Mild right posterior glottic web. Airway was patent.   The addition of stroboscopy allowed evaluation of the mucosal wave.   Amplitude: right: severely decreased; left: mildly  "decreased. Symmetry: associated asymmetry. Closure pattern: complete and irregular. Closure plane: at glottic level. Phase distribution: normal.      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 We had a lengthy discussion today. We discussed that while in-clinic KTP laser treatment can be an option, his burden of disease is significant enough that he would benefit from formal treatment in the OR; at times these procedures do need to be staged. We also discussed that there is some evidence in the literature potentially supporting the utility of HPV vaccination in adults who already have RRP. The data are preliminary and the included number of patients is small, but the vaccine appears to be well tolerated and may be associated with longer intervals between procedures. He will look into this and let us know if he needs a letter of support.    He asked that I speak with Dr Ronquillo to clarify whether he should have surgery with me or continue with Dr Ronquillo. He wanted me to be sure to convey to him that he is very loyal to Dr Ronquillo. I mentioned that Dr Ronquillo has clearly done very well for him over the years, and I am also happy to help if he deems appropriate.    He will return as needed. I appreciate the opportunity to participate in the care of this patient.      Betty Mitchell MD      "

## 2017-09-18 NOTE — MR AVS SNAPSHOT
After Visit Summary   2017    Cleveland Rodríguez    MRN: [id number]           Patient Information     Date Of Birth          1955        Visit Information        Provider Department      2017 10:10 AM Segundo Meza SLP [facility]        Today's Diagnoses     Dysphonia    -  1    Recurrent respiratory papillomatosis           Follow-ups after your visit        Your next 10 appointments already scheduled     Oct 06, 2017  8:00 AM CDT   PHYSICAL with Yoel Amaro MD   [facility]    [address] [place] [id number] [phone]              Who to contact     Please call your clinic at [phone] to:    Ask questions about your health    Make or cancel appointments    Discuss your medicines    Learn about your test results    Speak to your doctor   If you have compliments or concerns about an experience at your clinic, or if you wish to file a complaint, please contact [facility] Patient Relations at [phone] or email us at [email or web address]         Additional Information About Your Visit        MyChart Information     [employer]iva is an electronic gateway that provides easy, online access to your medical records. With Cartiva, you can request a clinic appointment, read your test results, renew a prescription or communicate with your care team.     To sign up for Cartiva visit the website at [email or web address]   You will be asked to enter the access code listed below, as well as some personal information. Please follow the directions to create your username and password.     Your access code is: [email or web address]  Expires: 12/3/2017  6:30 AM     Your access code will  in 90 days. If you need help or a new code, please contact your [facility] Clinic or call [phone] for assistance.        Care EveryWhere ID     This is your Care EveryWhere  ID. This could be used by other organizations to access your [facility] medical records  [id number]         Blood Pressure from Last 3 Encounters:   09/08/16 104/66   11/17/15 98/56   07/22/15 100/60    Weight from Last 3 Encounters:   09/18/17 90.3 kg (199 lb)   09/08/16 90.3 kg (199 lb)   11/17/15 96.6 kg (213 lb)              We Performed the Following     [facility] BEHAVIORAL [facility] QUALITATIVE ANALYSIS VOICE AND RESONANCE        Primary Care Provider Office Phone # Fax #    Yoel Amaro [phone] [address]  [facility] [address]        Equal Access to Services     ROSELYN BRUNER : Hadii nubia nolando Soamena, waaxda luqadaha, qaybta kaalmada adesadieda, ismael barakat. So [facility] [phone].    ATENCIÓN: Si habla español, tiene a boles disposición servicios gratuitos de asistencia lingüística. Llame al [phone].    We comply with applicable federal civil rights laws and [place] laws. We do not discriminate on the basis of race, color, national origin, age, disability sex, sexual orientation or gender identity.            Thank you!     Thank you for choosing [facility]  for your care. Our goal is always to provide you with excellent care. Hearing back from our patients is one way we can continue to improve our services. Please take a few minutes to complete the written survey that you may receive in the mail after your visit with us. Thank you!             Your Updated Medication List - Protect others around you: Learn how to safely use, store and throw away your medicines at www.disposemymeds.org.          This list is accurate as of: 9/18/17  1:23 PM.  Always use your most recent med list.                   Brand Name Dispense Instructions for use Diagnosis    aspirin 81 MG tablet      Take 1 tablet by mouth daily        atorvastatin 20 MG tablet    LIPITOR    90 tablet    TAKE 1 TABLET BY MOUTH DAILY    Hyperlipidemia LDL goal <130       DAILY MULTIVITAMIN PO       Take  by mouth 2 times daily.    Routine general medical examination at a health care facility, Polyp of vocal cord or larynx, Degeneration of lumbar or lumbosacral intervertebral disc, Iritis, Hyperlipidemia LDL goal <130       metroNIDAZOLE 1 % gel    METROGEL    60 g    Apply 1 g topically daily apply hs for Rosacea    Rosacea

## 2017-09-18 NOTE — NURSING NOTE
Chief Complaint   Patient presents with     Consult     referal from Dr. Ronquillo for Papalloma      Tyler Hutchinson

## 2017-09-18 NOTE — Clinical Note
9/18/2017       RE: Cleveland Rodríguez  [address] [place] [place] [address]     Dear Colleague,    Thank you for referring your patient, Cleveland Rodríguez, to the [facility] EAR NOSE AND THROAT at [facility]. Please see a copy of my visit note below.    Dear Dr. Ronquillo:    I had the pleasure of meeting Cleveland Rodríguez in consultation at the [facility] of the [facility] Otolaryngology Clinic at your request, for evaluation of chronic cough and throat concerns. The note from our visit follows.  I appreciate the opportunity to participate in the care of this pleasant patient.    Please feel free to contact me with any questions.    Sincerely yours,    Betty Mitchell M.D., M.P.H.  , Laryngology  Director, [facility]  Otolaryngology- Head & Neck Surgery  [phone]          =====    History of Present Illness:   Cleveland Rodríguez is a pleasant 61-year-old male who presents with a history of throat concerns. Symptoms include increased effort to talk/sing, mucus in throat, frequent throat-clearing, frequent cough, poor voice quality, voice breaks, change in vocal pitch, change in vocal volume, change in range and raspy voice.      Voice  He has had this voice problem for 58 years. The voice symptoms are variable and unpredictable, and worse with stress. The voice is getting worse over time. His routine vocal demand is extensive.    He is status post a visit to the operating room for bilateral vocal fold lesion removal in August of 2015 with endoscopic shaver and inferiorly based microflap reconstruction bilaterally. Pathology was negative for high grade dysplasia.  HPV type not known.  He has no prior HPV vaccine.  He has undergone greater than 60 prior procedures in the past. He has previously reported hoarseness and heavy night breathing.  When seen most recently by Dr. Ronquillo, he was noted to have  bilateral papilloma as well as small to medium web scar tissue.  He has had no prior speech therapy.     His current voice quality is as low as it has ever gotten. He describes himself as a social person, and when his voice is bad he talks less. He works as a rodríguez.      Swallowing  No concerns.      Cough/Throat-clearing  He has also had a chronic cough/throat clear over the past six months or so.  This is unpredictable and feels 0% controllable. Triggers include talking, laughing, eating and lying down.  There is no preceding tickle.  Most of the time it is a dry cough.  He finds the symptoms annoying. He is taking no new medications associated with cough onset.      Breathing  he has had some breathing problems during sleep for the past six years or so.  He attributes this to the papilloma as well, and notes that it occurs without exertion.   He notes expiratory noise, but no stridor.    He had a sleep study two years ago, which was borderline negative by his report.  His wife reports he is a very loud sleeper, although she does not hear pauses.      Throat discomfort  No concerns.       Reflux-type symptoms  He experiences heartburn/indigestion weekly to monthly. He is not taking reflux medications.      Prior outside records were reviewed for this visit. He also has a history of uveitis which has been managed with local steroids. He has had some chest pain in the past that he states is not heartburn. I has been evaluated in the past and was not felt to be cardiac.    MEDICATIONS:     Current Outpatient Prescriptions   Medication Sig Dispense Refill     atorvastatin (LIPITOR) 20 MG tablet TAKE 1 TABLET BY MOUTH DAILY 90 tablet 0     aspirin 81 MG tablet Take 1 tablet by mouth daily        metroNIDAZOLE (METROGEL) 1 % gel Apply 1 g topically daily apply hs for Rosacea 60 g 11     Multiple Vitamin (DAILY MULTIVITAMIN PO) Take  by mouth 2 times daily.         ALLERGIES:    Allergies   Allergen Reactions      Amoxicillin      Rash         PAST MEDICAL HISTORY:   Past Medical History:   Diagnosis Date     Other and unspecified hyperlipidemia      Polyp of vocal cord or larynx      Rosacea      Uveitis         PAST SURGICAL HISTORY:   Past Surgical History:   Procedure Laterality Date     CATARACT IOL, RT/LT      right eye only     COLONOSCOPY  11/4/2013    Procedure: COLONOSCOPY;  COLONOSCOPY;  Surgeon: Sawyer Niño MD;  Location:  GI     SURGICAL HISTORY OF -       about 55 vocal cord surgeries       HABITS/SOCIAL HISTORY:    Social History   Substance Use Topics     Smoking status: Never Smoker     Smokeless tobacco: Never Used     Alcohol use Yes      Comment: social         FAMILY HISTORY:    Family History   Problem Relation Age of Onset     HEART DISEASE Father 46       REVIEW OF SYSTEMS:  The patient completed a comprehensive 11 point review of systems (below), which was reviewed. Positives are as noted below; pertinent findings are as noted in the HPI.     Patient Supplied Answers to Review of Systems  No flowsheet data found.    PHYSICAL EXAMINATION:  General: The patient was alert and conversant, and in no acute distress. Patient accompanied by his spouse.  Eyes: PERRL, conjunctiva and lids normal, sclera nonicteric.  Nose: Anterior rhinoscopy: no gross abnormalities. no  bleeding; no  mucopurulence; septum grossly normal, mild mucoid drainage and/or crusting.  Oral cavity/oropharynx: No masses or lesions. Dentition in fair condition. Floor of mouth and oral tongue soft to palpation. Tongue mobility and palate elevation intact and symmetric.  Ears: Normal auricles, external auditory canals bilaterally. Visualized portions of tympanic membranes normal bilaterally. Bilateral moderate cerumen.  Neck: No palpable cervical lymphadenopathy. There was no significant tenderness to palpation of the thyrohyoid space, which was mildly narrow. No obvious thyroid abnormality. Landmarks palpable.  Resp: Breathing  "comfortably, no stridor or stertor.  Neuro: Symmetric facial function. Other cranial nerves as documented above.  Psych: Normal affect, pleasant and cooperative.  Voice/speech: Moderate dysphonia characterized by breathiness, roughness and strain.  Extremities: No cyanosis, clubbing, or edema of the upper extremities.      Intake scores  Total Score for Last Patient-Answered VHI Questionnaire  VHI Total Score 9/18/2017   VHI Total Score 25     Total Score for Last Patient-Answered EAT Questionnaire  No flowsheet data found.    Total Score for Last Patient-Answered CSI Questionnaire  No flowsheet data found.      PROCEDURE:   Flexible fiberoptic laryngoscopy and laryngovideostroboscopy  Indications: This procedure was warranted to evaluate the patient's laryngeal anatomy and function. Risks, benefits, and alternatives were discussed.  Description: After written informed consent was obtained, a time-out was performed to confirm patient identity, procedure, and procedure site. Topical 3% lidocaine with 0.25% phenylephrine was applied to the nasal cavities. I performed the endoscopy and no complications were apparent. Continuous and stroboscopic light were utilized to assess routine phonation and variable frequency phonation.  Performed by: Betty Mitchell MD MPH  SLP: Ermias Meza MM, MA, CCC-SLP   Findings: Normal nasopharynx. Normal base of tongue, valleculae, and epiglottis. Vocal fold mobility: right: normal; left: normal. Medial edges of the vocal folds: left with diffuse clusters of papilloma along false and true vocal folds; right with superior/lateral papilloma, medial edge appeared clear. { ENT MUCOSAL LESIONS:560628960} Glissade { ENT GLISSADE:364334834::\"produced appropriate elongation\"}. There was {SEVERITY (TF):702389} supraglottic recruitment with connected speech. Mucosa of false vocal folds, aryepiglottic folds, piriform sinuses, and posterior glottis unremarkable. Airway was ***patent. {UC " "ENT THERAPY PROBES:320809230} { ENT NBI:038826079}    The addition of stroboscopy allowed evaluation of the mucosal wave.   Amplitude: right: {AMPLITUDE:788829}; left: {AMPLITUDE:370314}. Symmetry: {SYMMETRY:395625025}. Closure pattern: {CLOSURE PATTERN:772452}. Closure plane: { ENT CLOSURE PLANE:505647341}. Phase distribution: {PHASE DISTRIBUTION:731926}.      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 We had a lengthy discussion today.     Discussion included SLP,      We also discussed that there is some evidence in the literature potentially supporting the utility of HPV vaccination in adults who already have RRP. The data are preliminary and the included number of patients is small, but the vaccine appears to be well tolerated and may be associated with longer intervals between procedures.       He asked that I speak with Dr Ronquillo ***        He will return *** or sooner as needed. I appreciate the opportunity to participate in the care of this patient.            Again, thank you for allowing me to participate in the care of your patient.      Sincerely,    Betty Mitchell MD    "

## 2017-09-21 ENCOUNTER — TELEPHONE (OUTPATIENT)
Dept: OTOLARYNGOLOGY | Facility: CLINIC | Age: 62
End: 2017-09-21

## 2017-09-21 NOTE — TELEPHONE ENCOUNTER
9/20/17 lt voicemail for call back to surgery scheduling for Dr Stephen Rosa   [facility]-Op Coordinator  [phone]      Scheduled for 10/26/17

## 2017-10-03 ENCOUNTER — OFFICE VISIT (OUTPATIENT)
Dept: FAMILY MEDICINE | Facility: CLINIC | Age: 62
End: 2017-10-03
Payer: COMMERCIAL

## 2017-10-03 VITALS
SYSTOLIC BLOOD PRESSURE: 102 MMHG | DIASTOLIC BLOOD PRESSURE: 78 MMHG | BODY MASS INDEX: 25.94 KG/M2 | OXYGEN SATURATION: 98 % | RESPIRATION RATE: 16 BRPM | WEIGHT: 202 LBS | HEART RATE: 69 BPM | TEMPERATURE: 97.6 F

## 2017-10-03 DIAGNOSIS — J38.7 LARYNGEAL NODULE: ICD-10-CM

## 2017-10-03 DIAGNOSIS — E78.5 HYPERLIPIDEMIA LDL GOAL <130: ICD-10-CM

## 2017-10-03 DIAGNOSIS — Z23 NEED FOR PROPHYLACTIC VACCINATION AND INOCULATION AGAINST INFLUENZA: ICD-10-CM

## 2017-10-03 DIAGNOSIS — Z01.818 PREOP GENERAL PHYSICAL EXAM: Primary | ICD-10-CM

## 2017-10-03 LAB — HGB BLD-MCNC: 13.5 G/DL (ref 13.3–17.7)

## 2017-10-03 PROCEDURE — 93000 ELECTROCARDIOGRAM COMPLETE: CPT | Performed by: FAMILY MEDICINE

## 2017-10-03 PROCEDURE — 36415 COLL VENOUS BLD VENIPUNCTURE: CPT | Performed by: FAMILY MEDICINE

## 2017-10-03 PROCEDURE — 80061 LIPID PANEL: CPT | Performed by: FAMILY MEDICINE

## 2017-10-03 PROCEDURE — 99214 OFFICE O/P EST MOD 30 MIN: CPT | Performed by: FAMILY MEDICINE

## 2017-10-03 PROCEDURE — 85018 HEMOGLOBIN: CPT | Performed by: FAMILY MEDICINE

## 2017-10-03 NOTE — NURSING NOTE
"Chief Complaint   Patient presents with     Pre-Op Exam     Surgery on vocal chords        Initial /78 (BP Location: Left arm, Patient Position: Sitting, Cuff Size: Adult Regular)  Pulse 69  Temp 97.6  F (36.4  C) (Oral)  Resp 16  Wt 202 lb (91.6 kg)  SpO2 98%  BMI 25.94 kg/m2 Estimated body mass index is 25.94 kg/(m^2) as calculated from the following:    Height as of 9/18/17: 6' 2\" (1.88 m).    Weight as of this encounter: 202 lb (91.6 kg).  Medication Reconciliation: complete     Danielle Riley MA      "

## 2017-10-03 NOTE — MR AVS SNAPSHOT
After Visit Summary   10/3/2017    Cleveland Rodríguez    MRN: [id number]           Patient Information     Date Of Birth          1955        Visit Information        Provider Department      10/3/2017 5:40 PM Yoel Amaro MD [facility]        Today's Diagnoses     Preop general physical exam    -  1    Laryngeal nodule        Hyperlipidemia LDL goal <130        Need for prophylactic vaccination and inoculation against influenza          Care Instructions      Before Your Surgery      Call your surgeon if there is any change in your health. This includes signs of a cold or flu (such as a sore throat, runny nose, cough, rash or fever).    Do not smoke, drink alcohol or take over the counter medicine (unless your surgeon or primary care doctor tells you to) for the 24 hours before and after surgery.    If you take prescribed drugs: Follow your doctor s orders about which medicines to take and which to stop until after surgery.    Eating and drinking prior to surgery: follow the instructions from your surgeon    Take a shower or bath the night before surgery. Use the soap your surgeon gave you to gently clean your skin. If you do not have soap from your surgeon, use your regular soap. Do not shave or scrub the surgery site.  Wear clean pajamas and have clean sheets on your bed.           Follow-ups after your visit        Your next 10 appointments already scheduled     Oct 06, 2017  8:00 AM CDT   PHYSICAL with Yoel Amaro MD   [facility] ([facility])    [address] [place] [id number] [phone]            Oct 20, 2017  2:00 PM CDT   (Arrive by 1:45 PM)   Return Visit with JULISSA Morales    [facility] ([facility])    [address] [place] [id number] [phone]            Oct 24, 2017  5:00 PM CDT   (Arrive by 4:45 PM)   PAC PHONE RN ASSESSMENT with  Pac  Rn   [facility] ([facility])    [address] [place] [address]  4th [facility] [id number] [phone]           Note: this is not an onsite visit; there is no need to come to the facility.            Oct 26, 2017   Procedure with Betty Mitchell MD   [facility] ([facility])    [address] [place] [address]  5th Floor  [facility] [id number] [phone]           Located in the Clinics and Surgery Center at [address] [place] [address].   parking is very convenient and highly recommended.  is a $6 flat rate fee.  Both  and self parkers should enter the main arrival plaza from [facility]; parking attendants will direct you based on your parking preference.            Nov 13, 2017  1:20 PM CST   (Arrive by 1:05 PM)   Return Visit with Betty Mitchell MD   [facility] ([facility])    [address] [place] [address]  4th [facility] [id number] [phone]           - This is a multidisciplinary care team visit with an ENT physician and may include a speech language pathologist. - It is important to know that if you are evaluated and/or treated by both a physician and a speech pathologist during your visit, your billing will reflect the input that you receive from both providers as separate professionals. Although most insurance plans do cover these services, we encourage you to contact your insurance company prior to your visit to determine whether there are any coverage limitations that might affect you financially. - Billing/procedure codes that are frequently associated with visits to our clinic include (but are not limited to) the ones listed below. Most patients will not need all of these items, but it may be useful to ask your insurance company's patient . 40407: Flexible fiberoptic laryngoscopy,  "69365: Laryngoscopy; flexible or rigid fiberoptic, with stroboscopy, 13892: Flexible endoscopic evaluation of swallowing, 28723: Evaluation of Voice and Resonance, 36221: Speech pathology treatment for voice, speech, communication, 99950: Speech pathology treatment for swallowing/oral function for feeding - If you have any concerns or questions, or if you would prefer not to have a speech pathologist involved in your visit, please contact our Clinic Coordinator at [phone], at least 24 hours prior to your appointment.              Who to contact     If you have questions or need follow up information about today's clinic visit or your schedule please contact [facility] directly at [phone].  Normal or non-critical lab and imaging results will be communicated to you by PeerPonghart, letter or phone within 4 business days after the clinic has received the results. If you do not hear from us within 7 days, please contact the clinic through PeerPonghart or phone. If you have a critical or abnormal lab result, we will notify you by phone as soon as possible.  Submit refill requests through [employer] or call your pharmacy and they will forward the refill request to us. Please allow 3 business days for your refill to be completed.          Additional Information About Your Visit        [employer] Information     [employer] lets you send messages to your doctor, view your test results, renew your prescriptions, schedule appointments and more. To sign up, go to [email or web address] . Click on \"Log in\" on the left side of the screen, which will take you to the Welcome page. Then click on \"Sign up Now\" on the right side of the page.     You will be asked to enter the access code listed below, as well as some personal information. Please follow the directions to create your username and password.     Your access code is: [email or web address]  Expires: 12/3/2017  6:30 AM     Your access code will  in 90 days. If you " need help or a new code, please call your [facility] or [phone].        Care EveryWhere ID     This is your Care EveryWhere ID. This could be used by other organizations to access your [facility] medical records  [id number]        Your Vitals Were     Pulse Temperature Respirations Pulse Oximetry BMI (Body Mass Index)       69 97.6  F (36.4  C) (Oral) 16 98% 25.94 kg/m2        Blood Pressure from Last 3 Encounters:   10/03/17 102/78   09/08/16 104/66   11/17/15 98/56    Weight from Last 3 Encounters:   10/03/17 202 lb (91.6 kg)   09/18/17 199 lb (90.3 kg)   09/08/16 199 lb (90.3 kg)              We Performed the Following     EKG 12-lead complete w/read - Clinics     Hemoglobin     Lipid panel reflex to direct LDL        Primary Care Provider Office Phone # Fax #    Yoel Amaro [phone]       [address] [facility] [address]        Equal Access to Services     CHARLOTTE BRUNER : Hadii aad ku hadasho Soomaali, waaxda luqadaha, qaybta kaalmada adeegyada, waxay kwasi haygallito west . So [facility] [phone].    ATENCIÓN: Si habla español, tiene a boles disposición servicios gratuitos de asistencia lingüística. Llame al [phone].    We comply with applicable federal civil rights laws and [place] laws. We do not discriminate on the basis of race, color, national origin, age, disability, sex, sexual orientation, or gender identity.            Thank you!     Thank you for choosing [facility]  for your care. Our goal is always to provide you with excellent care. Hearing back from our patients is one way we can continue to improve our services. Please take a few minutes to complete the written survey that you may receive in the mail after your visit with us. Thank you!             Your Updated Medication List - Protect others around you: Learn how to safely use, store and throw away your medicines at www.disposemymeds.org.          This list is accurate as of:  10/3/17  6:02 PM.  Always use your most recent med list.                   Brand Name Dispense Instructions for use Diagnosis    aspirin 81 MG tablet      Take 1 tablet by mouth daily        atorvastatin 20 MG tablet    LIPITOR    90 tablet    TAKE 1 TABLET BY MOUTH DAILY    Hyperlipidemia LDL goal <130       DAILY MULTIVITAMIN PO      Take  by mouth 2 times daily.    Routine general medical examination at a health care facility, Polyp of vocal cord or larynx, Degeneration of lumbar or lumbosacral intervertebral disc, Iritis, Hyperlipidemia LDL goal <130       metroNIDAZOLE 1 % gel    METROGEL    60 g    Apply 1 g topically daily apply hs for Rosacea    Rosacea

## 2017-10-03 NOTE — LETTER
October 5, 2017      Cleveland Rodríguez  [address] [place] [address]        Dear ,    We are writing to inform you of your test results.    Your hemoglobin is normal  Cholesterol levels at goal. Call or send us a Kiip message if you have questions.    Resulted Orders   Lipid panel reflex to direct LDL   Result Value Ref Range    Cholesterol 146 <200 mg/dL    Triglycerides 128 <150 mg/dL      Comment:      Non Fasting    HDL Cholesterol 71 >39 mg/dL    LDL Cholesterol Calculated 49 <100 mg/dL      Comment:      Desirable:       <100 mg/dl    Non HDL Cholesterol 75 <130 mg/dL   Hemoglobin   Result Value Ref Range    Hemoglobin 13.5 13.3 - 17.7 g/dL       If you have any questions or concerns, please call the clinic at the number listed above.       Sincerely,        Yoel Amaro MD/nr

## 2017-10-03 NOTE — PROGRESS NOTES
[address] [address]  [phone]  Dept: [phone]    PRE-OP EVALUATION:  Today's date: 10/3/2017    Cleveland Rodríguez (: 1955) presents for pre-operative evaluation assessment as requested by Dr. Mitchell.  SHe requires evaluation and anesthesia risk assessment prior to undergoing surgery/procedure for treatment of Polyps removal from vocal cord .  Proposed procedure: polyps removal.    Date of Surgery/ Procedure: 10/26/17  Time of Surgery/ Procedure: [facility]/Surgical Facility: [facility]  Fax number for surgical facility:    Primary Physician: Yoel Amaro  Type of Anesthesia Anticipated: General    Patient has a Health Care Directive or Living Will:  YES     Preop Questions 10/3/2017   1.  Do you have a history of heart attack, stroke, stent, bypass or surgery on an artery in the head, neck, heart or legs? No   2.  Do you ever have any pain or discomfort in your chest? No   3.  Do you have a history of  Heart Failure? No   4.   Are you troubled by shortness of breath when:  walking on a level surface, or up a slight hill, or at night? No   5.  Do you currently have a cold, bronchitis or other respiratory infection? No   6.  Do you have a cough, shortness of breath, or wheezing? No   7.  Do you sometimes get pains in the calves of your legs when you walk? No   8. Do you or anyone in your family have previous history of blood clots? No   9.  Do you or does anyone in your family have a serious bleeding problem such as prolonged bleeding following surgeries or cuts? No   10. Have you ever had problems with anemia or been told to take iron pills? No   11. Have you had any abnormal blood loss such as black, tarry or bloody stools? No   12. Have you ever had a blood transfusion? No   13. Have you or any of your relatives ever had problems with anesthesia? No   14. Do you have sleep apnea, excessive snoring or daytime drowsiness? YES -  snoring   15. Do you have any prosthetic heart valves? No   16. Do you have prosthetic joints? No           HPI:                                                      Brief HPI related to upcoming procedure: recurrent laryngeal polyps      See problem list for active medical problems.  Problems all longstanding and stable, except as noted/documented.  See ROS for pertinent symptoms related to these conditions.                                                                                                  .    MEDICAL HISTORY:                                                    Patient Active Problem List    Diagnosis Date Noted     Laryngeal nodule 07/22/2015     Priority: Medium     Other viral warts 09/10/2014     Priority: Medium     Diagnosis updated by automated process. Provider to review and confirm.       Rosacea      Priority: Medium     Advanced directives, counseling/discussion 09/14/2011     Priority: Medium     Advance Directive Problem List Overview:   Name Relationship Phone    Primary Health Care Agent            Alternative Health Care Agent          Patient states has Advance Directive and will bring in a copy to clinic. 9/14/2011          HYPERLIPIDEMIA LDL GOAL <130 10/31/2010     Priority: Medium     Iritis 09/09/2008     Priority: Medium     Family history of other cardiovascular diseases 02/07/2007     Priority: Medium     Problem list name updated by automated process. Provider to review and confirm  Problem list name updated by automated process. Provider to review       Carpal tunnel syndrome 02/07/2007     Priority: Medium     Degeneration of lumbar or lumbosacral intervertebral disc 02/07/2007     Priority: Medium     Polyp of vocal cord or larynx 03/23/2006     Priority: Medium      Past Medical History:   Diagnosis Date     Other and unspecified hyperlipidemia      Polyp of vocal cord or larynx      Rosacea      Uveitis      Past Surgical History:   Procedure Laterality Date     CATARACT  IOL, RT/LT      right eye only     COLONOSCOPY  11/4/2013    Procedure: COLONOSCOPY;  COLONOSCOPY;  Surgeon: Sawyer Niño MD;  Location:  GI     SURGICAL HISTORY OF -       about 55 vocal cord surgeries     Current Outpatient Prescriptions   Medication Sig Dispense Refill     atorvastatin (LIPITOR) 20 MG tablet TAKE 1 TABLET BY MOUTH DAILY 90 tablet 0     aspirin 81 MG tablet Take 1 tablet by mouth daily        metroNIDAZOLE (METROGEL) 1 % gel Apply 1 g topically daily apply hs for Rosacea 60 g 11     Multiple Vitamin (DAILY MULTIVITAMIN PO) Take  by mouth 2 times daily.       OTC products: None, except as noted above    Allergies   Allergen Reactions     Amoxicillin      Rash        Latex Allergy: NO    Social History   Substance Use Topics     Smoking status: Never Smoker     Smokeless tobacco: Never Used     Alcohol use Yes      Comment: social     History   Drug Use No       REVIEW OF SYSTEMS:                                                    Constitutional, HEENT, cardiovascular, pulmonary, gi and gu systems are negative, except as otherwise noted.      EXAM:                                                    /78 (BP Location: Left arm, Patient Position: Sitting, Cuff Size: Adult Regular)  Pulse 69  Temp 97.6  F (36.4  C) (Oral)  Resp 16  Wt 202 lb (91.6 kg)  SpO2 98%  BMI 25.94 kg/m2    GENERAL APPEARANCE: healthy, alert and no distress     EYES: Eyes grossly normal to inspection     HENT: ear canals and TM's normal and nose and mouth without ulcers or lesions     NECK: no adenopathy, no asymmetry, masses, or scars and thyroid normal to palpation     RESP: lungs clear to auscultation - no rales, rhonchi or wheezes     CV: regular rates and rhythm, normal S1 S2, no S3 or S4 and no murmur, click or rub     ABDOMEN:  soft, nontender, no HSM or masses and bowel sounds normal     MS: extremities normal- no gross deformities noted, no evidence of inflammation in joints, FROM in all  extremities.     SKIN: no suspicious lesions or rashes     NEURO: Normal strength and tone, sensory exam grossly normal, mentation intact and speech normal     PSYCH: mentation appears normal. and affect normal/bright    DIAGNOSTICS:                                                      EKG: appears normal, NSR, normal axis, normal intervals, no acute ST/T changes c/w ischemia, no LVH by voltage criteria, unchanged from previous tracings  Labs Resulted Today:   Results for orders placed or performed in visit on 10/03/17   Hemoglobin   Result Value Ref Range    Hemoglobin 13.5 13.3 - 17.7 g/dL       Recent Labs   Lab Test  09/08/16   1554  11/04/15   0719  10/28/14   1420   09/14/11   1532  10/07/10   1602   HGB   --   14.8  13.7   < >  13.5  14.2   PLT   --    --    --    --   152  180   NA   --   141  141   --   138   --    POTASSIUM   --   4.5  4.3   --   3.9   --    CR   --   1.09  1.01   --   1.01   --    A1C  5.7  5.9  5.9   < >   --    --     < > = values in this interval not displayed.        IMPRESSION:                                                        ICD-10-CM    1. Preop general physical exam Z01.818 Hemoglobin     EKG 12-lead complete w/read - Clinics   2. Laryngeal nodule J38.1    3. Hyperlipidemia LDL goal <130 E78.5 Lipid panel reflex to direct LDL   4. Need for prophylactic vaccination and inoculation against influenza Z23         The proposed surgical procedure is considered INTERMEDIATE risk.    REVISED CARDIAC RISK INDEX  The patient has the following serious cardiovascular risks for perioperative complications such as (MI, PE, VFib and 3  AV Block):  No serious cardiac risks  INTERPRETATION: 0 risks: Class I (very low risk - 0.4% complication rate)    The patient has the following additional risks for perioperative complications:  No identified additional risks    RECOMMENDATIONS:                                                      --Consult hospital rounder / IM to assist post-op medical  management    --Patient is to take all scheduled medications on the day of surgery EXCEPT for modifications listed below.    Anticoagulant or Antiplatelet Medication Use  ASPIRIN: Discontinue ASA 7-10 days prior to procedure to reduce bleeding risk.  It should be resumed post-operatively.          APPROVAL GIVEN to proceed with proposed procedure, without further diagnostic evaluation       Signed Electronically by: Yoel Amaro MD    Copy of this evaluation report is provided to requesting physician.    [facility] Preop Guidelines

## 2017-10-04 LAB
CHOLEST SERPL-MCNC: 146 MG/DL
HDLC SERPL-MCNC: 71 MG/DL
LDLC SERPL CALC-MCNC: 49 MG/DL
NONHDLC SERPL-MCNC: 75 MG/DL
TRIGL SERPL-MCNC: 128 MG/DL

## 2017-10-06 ENCOUNTER — TELEPHONE (OUTPATIENT)
Dept: OTOLARYNGOLOGY | Facility: CLINIC | Age: 62
End: 2017-10-06

## 2017-10-06 NOTE — TELEPHONE ENCOUNTER
RN calling pt to inform that Dr. Ronquillo would like Dr. Mitchell to take over the pt's care.  RN left direct call back number for questions or concerns.  Pt has surgery scheduled with Dr. Mitchell 10/26.    Elizabeth MILLERN, RN  [phone]  [facility] ENT   Head & Neck Surgery   10/6/2017 9:12 AM

## 2017-10-15 DIAGNOSIS — E78.5 HYPERLIPIDEMIA LDL GOAL <130: ICD-10-CM

## 2017-10-17 RX ORDER — ATORVASTATIN CALCIUM 20 MG/1
TABLET, FILM COATED ORAL
Qty: 90 TABLET | Refills: 3 | Status: SHIPPED | OUTPATIENT
Start: 2017-10-17 | End: 2018-08-30

## 2017-10-17 NOTE — TELEPHONE ENCOUNTER
Rx refilled per RN protocol.      atorvastatin (LIPITOR) 20 MG tablet  Last Written Prescription Date: 8/18/17  Last Fill Quantity: 90, # refills: 0  Last Office Visit with G, P or [facility] prescribing provider: 10/3/17       Lab Results   Component Value Date    CHOL 146 10/03/2017     Lab Results   Component Value Date    HDL 71 10/03/2017     Lab Results   Component Value Date    LDL 49 10/03/2017     Lab Results   Component Value Date    TRIG 128 10/03/2017     Lab Results   Component Value Date    CHOLHDLRATIO 2.9 11/04/2015         Eyal Canseco RN

## 2017-10-20 ENCOUNTER — OFFICE VISIT (OUTPATIENT)
Dept: OTOLARYNGOLOGY | Facility: CLINIC | Age: 62
End: 2017-10-20

## 2017-10-20 DIAGNOSIS — Z78.9 RECURRENT RESPIRATORY PAPILLOMATOSIS: ICD-10-CM

## 2017-10-20 DIAGNOSIS — R49.0 DYSPHONIA: Primary | ICD-10-CM

## 2017-10-20 NOTE — MR AVS SNAPSHOT
After Visit Summary   10/20/2017    Cleveland Rodríguez    MRN: [id number]           Patient Information     Date Of Birth          1955        Visit Information        Provider Department      10/20/2017 1:00 PM Segundo Meza, SLP [facility] Voice        Today's Diagnoses     Dysphonia    -  1    Recurrent respiratory papillomatosis           Follow-ups after your visit        Your next 10 appointments already scheduled     Oct 24, 2017  5:00 PM CDT   (Arrive by 4:45 PM)   PAC PHONE RN ASSESSMENT with Uc Pac Rn   [facility] Preoperative Assessment Center ([facility])    [address] [place] [address]  4th [facility] [address] [phone]           Note: this is not an onsite visit; there is no need to come to the facility.            Oct 26, 2017   Procedure with Betty Mitchell MD   [facility] Surgery and Procedure Center ([facility])    [address] [place] [address]  5th [facility] [id number] [phone]           Located in the Clinics and Surgery Center at [address] [place] [address].   parking is very convenient and highly recommended.  is a $6 flat rate fee.  Both  and self parkers should enter the main arrival plaza from [facility]; parking attendants will direct you based on your parking preference.            Nov 13, 2017  1:20 PM CST   (Arrive by 1:05 PM)   Return Visit with Betty Mitchell MD   [facility] Ear Nose and Throat ([facility])    [address] [place] [id number] [phone]           This is a multi-disciplinary care team visit as patients with your type of problem are usually seen by a team of an MD and a Speech-Language Pathologist (who is a specialist in disorders of the voice, throat, and breathing).  Please plan about 2 hours for your visit, which will likely include Laryngeal Function Studies, a  Voice/Swallow/Breathing Evaluation, and an Endoscopic Laryngeal Examination to provide a comprehensive evaluation.  Please check with your insurance company to ensure you are covered for these services. - It is important to know that if you are evaluated and/or treated by both a physician and a speech pathologist during your visit, your billing will reflect the input that you receive from both providers as separate professionals. Although most insurance plans do cover these services, we encourage you to contact your insurance company prior to your visit to determine whether there are any coverage limitations that might affect you financially. - Billing/procedure codes that are frequently associated with visits to our clinic include (but are not limited to) the ones listed below. Most patients will not need all of these items, but it may be useful to ask your insurance company's patient . 49360: Flexible fiberoptic laryngoscopy, 73101: Laryngoscopy; flexible or rigid fiberoptic, with stroboscopy, 37939: Flexible endoscopic evaluation of swallowing, 85510: Laryngeal function aerodynamic evaluation, 08986: Evaluation of Voice and Resonance, 53158: Speech pathology treatment for voice, speech, communication, 58659: Speech pathology treatment for swallowing/oral function for feeding - If you have any concerns or questions, or if you would prefer not to have a speech pathologist involved in your visit, please contact our Clinic Coordinator at [phone], at least 24 hours prior to your appointment.              Who to contact     Please call your clinic at [phone] to:    Ask questions about your health    Make or cancel appointments    Discuss your medicines    Learn about your test results    Speak to your doctor   If you have compliments or concerns about an experience at your clinic, or if you wish to file a complaint, please contact [facility] Patient Relations  at [phone] or email us at [email or web address]         Additional Information About Your Visit        [employer] Information     [employer] is an electronic gateway that provides easy, online access to your medical records. With Guardium, you can request a clinic appointment, read your test results, renew a prescription or communicate with your care team.     To sign up for Guardium visit the website at [email or web address]   You will be asked to enter the access code listed below, as well as some personal information. Please follow the directions to create your username and password.     Your access code is: [email or web address]  Expires: 12/3/2017  6:30 AM     Your access code will  in 90 days. If you need help or a new code, please contact your [facility] or call [phone] for assistance.        Care EveryWhere ID     This is your Care EveryWhere ID. This could be used by other organizations to access your [facility] medical records  [id number]         Blood Pressure from Last 3 Encounters:   10/03/17 102/78   16 104/66   11/17/15 98/56    Weight from Last 3 Encounters:   10/03/17 91.6 kg (202 lb)   17 90.3 kg (199 lb)   16 90.3 kg (199 lb)              We Performed the Following     SPEECH/HEARING THERAPY, INDIVIDUAL        Primary Care Provider Office Phone # Fax #    Yoel Cody Amaro [phone]       [address] [facility] [address]        Equal Access to Services     CHARLOTTE BRUNER : Hadii aad ku hadasho Soomaali, waaxda luqadaha, qaybta kaalmada ronnyarand, ismael west . So [facility] [phone].    ATENCIÓN: Si habla español, tiene a boles disposición servicios gratuitos de asistencia lingüística. Llame al [phone].    We comply with applicable federal civil rights laws and [place] laws. We do not discriminate on the basis of race, color, national origin, age, disability, sex, sexual orientation,  or gender identity.            Thank you!     Thank you for choosing [facility]  for your care. Our goal is always to provide you with excellent care. Hearing back from our patients is one way we can continue to improve our services. Please take a few minutes to complete the written survey that you may receive in the mail after your visit with us. Thank you!             Your Updated Medication List - Protect others around you: Learn how to safely use, store and throw away your medicines at www.disposemymeds.org.          This list is accurate as of: 10/20/17  3:54 PM.  Always use your most recent med list.                   Brand Name Dispense Instructions for use Diagnosis    aspirin 81 MG tablet      Take 1 tablet by mouth daily        * atorvastatin 20 MG tablet    LIPITOR    90 tablet    TAKE 1 TABLET BY MOUTH DAILY    Hyperlipidemia LDL goal <130       * atorvastatin 20 MG tablet    LIPITOR    90 tablet    TAKE 1 TABLET BY MOUTH DAILY    Hyperlipidemia LDL goal <130       DAILY MULTIVITAMIN PO      Take  by mouth 2 times daily.    Routine general medical examination at a health care facility, Polyp of vocal cord or larynx, Degeneration of lumbar or lumbosacral intervertebral disc, Iritis, Hyperlipidemia LDL goal <130       metroNIDAZOLE 1 % gel    METROGEL    60 g    Apply 1 g topically daily apply hs for Rosacea    Rosacea       * Notice:  This list has 2 medication(s) that are the same as other medications prescribed for you. Read the directions carefully, and ask your doctor or other care provider to review them with you.

## 2017-10-20 NOTE — LETTER
"10/20/2017      RE: Cleveland Rodríguez  [address] [place] [address]       [facility] VOICE CLINIC  THERAPY NOTE (CPT 70608)    Patient: Cleveland Rodríguez  Date of Service: 10/20/2017  Referring physician: Dr. Mitchell  Impressions from most recent evaluation:  \"Mr. Rodríguez has significant R49.0 (Dysphonia) in the context of longstanding Z78.9 (Recurrent Respiratory Papillomatosis) and an imbalance in function of the intrinsic and extrinsic muscles of the larynx.  \"    SUBJECTIVE:  Since his last sesion, Mr. Rodríguez reports the following:     Overall he reports that symptoms are about the same    Quality can be variable, but are worst over the phone    OBJECTIVE:  PATIENT REPORTED MEASURES:  Will be collected during the patients second session    THERAPEUTIC ACTIVITIES  Today Mr. Rodríguez participated in the following therapeutic activities:    Counseling and Education:    Asked many questions about the nature of his symptoms, and I answered all of these thoroughly.    Post surgical protocol    3-5 days of total voice rest (potentially more as determined at time of surgery)    Strategies for implementation discussed    \"trial run\" recommended in advance of surgery to figure out hurdles before-hand    Use of gentle non-whispered voice, with emphasis on trying to maintain reduced effort rather than \"forcing\" a clear quality    Ramp up schedule following voice rest    Alternate forms of communcation    Wound-healing / pliability exercises    Semi-Occluded Vocal Tract (SOVT) exercises instructed to reduce laryngeal tension, promote vocal fold pliability, and coordinate respiration and phonation    Straw with water resistance was found to be most facilitating    Sustained phonation, and voice vs. voiceless productions used to promote easy voicing and raise awareness of laryngeal tension    Ascending and descending glides utilized to promote vocal fold pliability    A schedule for their use in conjunction with " voice ramp up was presented.    Education regarding phonotraumatic behaviors and instruction of strategies and techniques to avoid their use    Coughing    Valsalva    Whispering    Voice use outside of ramp-up schedule    Vocal hygiene education    Systemic hydration    Topical hydration      I provided an audio recording and handouts of today's therapeutic activities to facilitate practice.    ASSESSMENT/PLAN  PROGRESS TOWARD LONG TERM GOALS:   Minimal at this point, as this is first session, but good learning today    IMPRESSIONS: R49.0 (Dysphonia) in the context of longstanding Z78.9 (Recurrent Respiratory Papillomatosis) and an imbalance in function of the intrinsic and extrinsic muscles of the larynx. Patient was attentive and focused throughout session.  Moderate clinician support required to achieve increased pitch variation during SOVT tasks.  An audio recording was made to promote accuracy in the home environment.  With regard to vocal demand post-surgery, the patient was adamant that he could be adherent to voice rest and post-op recommendations while maintaining his work schedule, and the challenges of that course were discussed. He was encouraged to maintain contact with me throughout the pre-op and post op period via e-mail or phone (when in keeping with recommendations) to ensure he is able to optimize recovery.     PLAN: I will see Mr. Rodríguez at his post-operative follow-up with Dr. Mitchell, at which time re-evaluation will be warranted.     TOTAL SERVICE TIME: 60 minutes  TREATMENT (20406): 60 minutes  NO CHARGE FACILITY FEE (28519)    Ermias Meza M.M., M.A., CCC-SLP  Speech-Language Pathologist  Certificate of Vocology  [phone]

## 2017-10-20 NOTE — PROGRESS NOTES
"[facility] VOICE CLINIC  THERAPY NOTE (CPT 39617)    Patient: Cleveland Rodríguez  Date of Service: 10/20/2017  Referring physician: Dr. Mitchell  Impressions from most recent evaluation:  \"Mr. Rodríguez has significant R49.0 (Dysphonia) in the context of longstanding Z78.9 (Recurrent Respiratory Papillomatosis) and an imbalance in function of the intrinsic and extrinsic muscles of the larynx.  \"    SUBJECTIVE:  Since his last sesion, Mr. Rodríguez reports the following:     Overall he reports that symptoms are about the same    Quality can be variable, but are worst over the phone    OBJECTIVE:  PATIENT REPORTED MEASURES:  Will be collected during the patients second session    THERAPEUTIC ACTIVITIES  Today Mr. Rodríguez participated in the following therapeutic activities:    Counseling and Education:    Asked many questions about the nature of his symptoms, and I answered all of these thoroughly.    Post surgical protocol    3-5 days of total voice rest (potentially more as determined at time of surgery)    Strategies for implementation discussed    \"trial run\" recommended in advance of surgery to figure out hurdles before-hand    Use of gentle non-whispered voice, with emphasis on trying to maintain reduced effort rather than \"forcing\" a clear quality    Ramp up schedule following voice rest    Alternate forms of communcation    Wound-healing / pliability exercises    Semi-Occluded Vocal Tract (SOVT) exercises instructed to reduce laryngeal tension, promote vocal fold pliability, and coordinate respiration and phonation    Straw with water resistance was found to be most facilitating    Sustained phonation, and voice vs. voiceless productions used to promote easy voicing and raise awareness of laryngeal tension    Ascending and descending glides utilized to promote vocal fold pliability    A schedule for their use in conjunction with voice ramp up was presented.    Education regarding phonotraumatic behaviors and " instruction of strategies and techniques to avoid their use    Coughing    Valsalva    Whispering    Voice use outside of ramp-up schedule    Vocal hygiene education    Systemic hydration    Topical hydration      I provided an audio recording and handouts of today's therapeutic activities to facilitate practice.    ASSESSMENT/PLAN  PROGRESS TOWARD LONG TERM GOALS:   Minimal at this point, as this is first session, but good learning today    IMPRESSIONS: R49.0 (Dysphonia) in the context of longstanding Z78.9 (Recurrent Respiratory Papillomatosis) and an imbalance in function of the intrinsic and extrinsic muscles of the larynx. Patient was attentive and focused throughout session.  Moderate clinician support required to achieve increased pitch variation during SOVT tasks.  An audio recording was made to promote accuracy in the home environment.  With regard to vocal demand post-surgery, the patient was adamant that he could be adherent to voice rest and post-op recommendations while maintaining his work schedule, and the challenges of that course were discussed. He was encouraged to maintain contact with me throughout the pre-op and post op period via e-mail or phone (when in keeping with recommendations) to ensure he is able to optimize recovery.     PLAN: I will see Mr. Rodríguez at his post-operative follow-up with Dr. Mitchell, at which time re-evaluation will be warranted.     TOTAL SERVICE TIME: 60 minutes  TREATMENT (36477): 60 minutes  NO CHARGE FACILITY FEE (96486)    Ermias Meza M.M., M.A., CCC-SLP  Speech-Language Pathologist  Certificate of Vocology  [phone]

## 2017-10-24 ENCOUNTER — ANESTHESIA EVENT (OUTPATIENT)
Dept: SURGERY | Facility: AMBULATORY SURGERY CENTER | Age: 62
End: 2017-10-24

## 2017-10-24 ENCOUNTER — APPOINTMENT (OUTPATIENT)
Dept: SURGERY | Facility: CLINIC | Age: 62
End: 2017-10-24

## 2017-10-25 NOTE — ANESTHESIA PREPROCEDURE EVALUATION
Physical Exam  Normal systems: cardiovascular, pulmonary and dental    Airway   Mallampati: I  TM distance: >3 FB  Neck ROM: full    Dental   Comment: Lots of expensive dental work per patient. Has a specially designed mouth guard.     Cardiovascular   Rhythm and rate: regular and normal      Pulmonary    breath sounds clear to auscultation                    Anesthesia Plan      History & Physical Review  History and physical reviewed and following examination; no interval change.    ASA Status:  2 .    NPO Status:  > 8 hours    Plan for General and ETT with Propofol and Intravenous induction. Maintenance will be TIVA.    PONV prophylaxis:  Ondansetron (or other 5HT-3) and Dexamethasone or Solumedrol  Additional equipment: Videolaryngoscope 5.0 laser safe       Postoperative Care  Postoperative pain management:  IV analgesics, Oral pain medications and Multi-modal analgesia.      Consents  Anesthetic plan, risks, benefits and alternatives discussed with:  Patient.  Use of blood products discussed: No .   .          ANESTHESIA PREOP EVALUATION    PROCEDURE: Procedure(s):  Microdirect Laryngoscopy with Carbon Dioxide Laser Excision/Ablation of Laryngeal Papilloma, Possible Biopsies - Wound Class:     HPI: Cleveland Rodríguez is a 62 year old male who presents for above procedure.    PAST MEDICAL HISTORY:    Past Medical History:   Diagnosis Date     Other and unspecified hyperlipidemia      Polyp of vocal cord or larynx      Rosacea      Uveitis        PAST SURGICAL HISTORY:    Past Surgical History:   Procedure Laterality Date     CATARACT IOL, RT/LT      right eye only     COLONOSCOPY  11/4/2013    Procedure: COLONOSCOPY;  COLONOSCOPY;  Surgeon: Sawyer Niño MD;  Location:  GI     SURGICAL HISTORY OF -       about 55 vocal cord surgeries       PAST ANESTHESIA HISTORY:     No personal or family h/o anesthesia problems    SOCIAL HISTORY:       Social History   Substance Use Topics     Smoking status:  Never Smoker     Smokeless tobacco: Never Used     Alcohol use Yes      Comment: social       ALLERGIES:     Allergies   Allergen Reactions     Amoxicillin      Rash         MEDICATIONS:       (Not in a hospital admission)    Current Outpatient Prescriptions   Medication Sig Dispense Refill     atorvastatin (LIPITOR) 20 MG tablet TAKE 1 TABLET BY MOUTH DAILY 90 tablet 0     aspirin 81 MG tablet Take 1 tablet by mouth daily        Multiple Vitamin (DAILY MULTIVITAMIN PO) Take  by mouth 2 times daily.       atorvastatin (LIPITOR) 20 MG tablet TAKE 1 TABLET BY MOUTH DAILY 90 tablet 3     metroNIDAZOLE (METROGEL) 1 % gel Apply 1 g topically daily apply hs for Rosacea 60 g 11       Current Outpatient Prescriptions Ordered in [facility]   Medication Sig Dispense Refill     atorvastatin (LIPITOR) 20 MG tablet TAKE 1 TABLET BY MOUTH DAILY 90 tablet 0     aspirin 81 MG tablet Take 1 tablet by mouth daily        Multiple Vitamin (DAILY MULTIVITAMIN PO) Take  by mouth 2 times daily.       atorvastatin (LIPITOR) 20 MG tablet TAKE 1 TABLET BY MOUTH DAILY 90 tablet 3     metroNIDAZOLE (METROGEL) 1 % gel Apply 1 g topically daily apply hs for Rosacea 60 g 11     No current [facility]-ordered facility-administered medications on file.        PHYSICAL EXAM:    Vitals: T Data Unavailable, P Data Unavailable, BP Data Unavailable, R Data Unavailable, SpO2  , Weight Wt Readings from Last 2 Encounters:   10/03/17 91.6 kg (202 lb)   09/18/17 90.3 kg (199 lb)       See doc flowsheet      No Data Recorded    No Data Recorded    No Data Recorded    No Data Recorded    No Data Recorded    No data recorded.  No data recorded.      NPO STATUS: see doc flowsheet    LABS:    BMP:  Recent Labs   Lab Test  11/04/15   0719   NA  141   POTASSIUM  4.5   CHLORIDE  106   CO2  30   BUN  23   CR  1.09   GLC  102*   PETRA  9.4       LFTs:   Recent Labs   Lab Test  11/04/15   0719   09/14/11   1532   PROTTOTAL   --    --   6.8   ALBUMIN   --    --   3.9   BILITOTAL   --     --   0.7   ALKPHOS   --    --   74   AST   --    --   37   ALT  41   < >  33    < > = values in this interval not displayed.       CBC:   Recent Labs   Lab Test  10/03/17   1709   09/14/11   1532   WBC   --    --   5.0   RBC   --    --   4.30*   HGB  13.5   < >  13.5   HCT   --    --   39.2*   MCV   --    --   91   MCH   --    --   31.4   MCHC   --    --   34.4   RDW   --    --   12.9   PLT   --    --   152    < > = values in this interval not displayed.       Coags:  No results for input(s): INR, PTT, FIBR in the last 46147 hours.    Imaging:  No orders to display       Ladarius Silver MD  Anesthesiology Staff  Pager [phone]    10/25/2017  2:22 PM                    .

## 2017-10-26 ENCOUNTER — ANESTHESIA (OUTPATIENT)
Dept: SURGERY | Facility: AMBULATORY SURGERY CENTER | Age: 62
End: 2017-10-26

## 2017-10-26 ENCOUNTER — HOSPITAL ENCOUNTER (OUTPATIENT)
Facility: AMBULATORY SURGERY CENTER | Age: 62
End: 2017-10-26
Attending: OTOLARYNGOLOGY

## 2017-10-26 VITALS
HEIGHT: 74 IN | WEIGHT: 202 LBS | RESPIRATION RATE: 14 BRPM | OXYGEN SATURATION: 97 % | SYSTOLIC BLOOD PRESSURE: 106 MMHG | DIASTOLIC BLOOD PRESSURE: 63 MMHG | TEMPERATURE: 97.8 F | BODY MASS INDEX: 25.93 KG/M2

## 2017-10-26 DIAGNOSIS — R49.0 DYSPHONIA: ICD-10-CM

## 2017-10-26 DIAGNOSIS — D14.1 LARYNGEAL PAPILLOMA: Primary | ICD-10-CM

## 2017-10-26 RX ORDER — ACETAMINOPHEN 325 MG/1
650 TABLET ORAL
Status: DISCONTINUED | OUTPATIENT
Start: 2017-10-26 | End: 2017-10-27 | Stop reason: HOSPADM

## 2017-10-26 RX ORDER — ONDANSETRON 2 MG/ML
INJECTION INTRAMUSCULAR; INTRAVENOUS PRN
Status: DISCONTINUED | OUTPATIENT
Start: 2017-10-26 | End: 2017-10-26

## 2017-10-26 RX ORDER — FENTANYL CITRATE 50 UG/ML
INJECTION, SOLUTION INTRAMUSCULAR; INTRAVENOUS PRN
Status: DISCONTINUED | OUTPATIENT
Start: 2017-10-26 | End: 2017-10-26

## 2017-10-26 RX ORDER — FENTANYL CITRATE 50 UG/ML
25-50 INJECTION, SOLUTION INTRAMUSCULAR; INTRAVENOUS
Status: DISCONTINUED | OUTPATIENT
Start: 2017-10-26 | End: 2017-10-26 | Stop reason: HOSPADM

## 2017-10-26 RX ORDER — PROPOFOL 10 MG/ML
INJECTION, EMULSION INTRAVENOUS CONTINUOUS PRN
Status: DISCONTINUED | OUTPATIENT
Start: 2017-10-26 | End: 2017-10-26

## 2017-10-26 RX ORDER — LIDOCAINE HYDROCHLORIDE 40 MG/ML
SOLUTION TOPICAL PRN
Status: DISCONTINUED | OUTPATIENT
Start: 2017-10-26 | End: 2017-10-26 | Stop reason: HOSPADM

## 2017-10-26 RX ORDER — ACETAMINOPHEN 325 MG/1
975 TABLET ORAL ONCE
Status: COMPLETED | OUTPATIENT
Start: 2017-10-26 | End: 2017-10-26

## 2017-10-26 RX ORDER — SODIUM CHLORIDE, SODIUM LACTATE, POTASSIUM CHLORIDE, CALCIUM CHLORIDE 600; 310; 30; 20 MG/100ML; MG/100ML; MG/100ML; MG/100ML
INJECTION, SOLUTION INTRAVENOUS CONTINUOUS
Status: DISCONTINUED | OUTPATIENT
Start: 2017-10-26 | End: 2017-10-26 | Stop reason: HOSPADM

## 2017-10-26 RX ORDER — ONDANSETRON 4 MG/1
4 TABLET, ORALLY DISINTEGRATING ORAL EVERY 30 MIN PRN
Status: DISCONTINUED | OUTPATIENT
Start: 2017-10-26 | End: 2017-10-27 | Stop reason: HOSPADM

## 2017-10-26 RX ORDER — KETOROLAC TROMETHAMINE 30 MG/ML
30 INJECTION, SOLUTION INTRAMUSCULAR; INTRAVENOUS EVERY 6 HOURS PRN
Status: DISCONTINUED | OUTPATIENT
Start: 2017-10-26 | End: 2017-10-27 | Stop reason: HOSPADM

## 2017-10-26 RX ORDER — LIDOCAINE 40 MG/G
CREAM TOPICAL
Status: DISCONTINUED | OUTPATIENT
Start: 2017-10-26 | End: 2017-10-26 | Stop reason: HOSPADM

## 2017-10-26 RX ORDER — HYDROCODONE BITARTRATE AND ACETAMINOPHEN 5; 325 MG/1; MG/1
1-2 TABLET ORAL EVERY 6 HOURS PRN
Qty: 12 TABLET | Refills: 0 | Status: SHIPPED | OUTPATIENT
Start: 2017-10-26 | End: 2018-07-18

## 2017-10-26 RX ORDER — DEXAMETHASONE SODIUM PHOSPHATE 10 MG/ML
10 INJECTION, SOLUTION INTRAMUSCULAR; INTRAVENOUS ONCE
Status: DISCONTINUED | OUTPATIENT
Start: 2017-10-26 | End: 2017-10-26 | Stop reason: HOSPADM

## 2017-10-26 RX ORDER — CLINDAMYCIN PHOSPHATE 900 MG/50ML
900 INJECTION, SOLUTION INTRAVENOUS
Status: COMPLETED | OUTPATIENT
Start: 2017-10-26 | End: 2017-10-26

## 2017-10-26 RX ORDER — NALOXONE HYDROCHLORIDE 0.4 MG/ML
.1-.4 INJECTION, SOLUTION INTRAMUSCULAR; INTRAVENOUS; SUBCUTANEOUS
Status: DISCONTINUED | OUTPATIENT
Start: 2017-10-26 | End: 2017-10-27 | Stop reason: HOSPADM

## 2017-10-26 RX ORDER — ONDANSETRON 2 MG/ML
4 INJECTION INTRAMUSCULAR; INTRAVENOUS EVERY 30 MIN PRN
Status: DISCONTINUED | OUTPATIENT
Start: 2017-10-26 | End: 2017-10-27 | Stop reason: HOSPADM

## 2017-10-26 RX ORDER — GABAPENTIN 300 MG/1
300 CAPSULE ORAL ONCE
Status: COMPLETED | OUTPATIENT
Start: 2017-10-26 | End: 2017-10-26

## 2017-10-26 RX ORDER — DEXAMETHASONE SODIUM PHOSPHATE 4 MG/ML
INJECTION, SOLUTION INTRA-ARTICULAR; INTRALESIONAL; INTRAMUSCULAR; INTRAVENOUS; SOFT TISSUE PRN
Status: DISCONTINUED | OUTPATIENT
Start: 2017-10-26 | End: 2017-10-26

## 2017-10-26 RX ORDER — OXYCODONE HYDROCHLORIDE 5 MG/1
5 TABLET ORAL ONCE
Status: COMPLETED | OUTPATIENT
Start: 2017-10-26 | End: 2017-10-26

## 2017-10-26 RX ORDER — HYDROCODONE BITARTRATE AND ACETAMINOPHEN 5; 325 MG/1; MG/1
1-2 TABLET ORAL
Status: DISCONTINUED | OUTPATIENT
Start: 2017-10-26 | End: 2017-10-27 | Stop reason: HOSPADM

## 2017-10-26 RX ORDER — LIDOCAINE HYDROCHLORIDE 10 MG/ML
INJECTION, SOLUTION INFILTRATION; PERINEURAL PRN
Status: DISCONTINUED | OUTPATIENT
Start: 2017-10-26 | End: 2017-10-26

## 2017-10-26 RX ORDER — MEPERIDINE HYDROCHLORIDE 25 MG/ML
12.5 INJECTION INTRAMUSCULAR; INTRAVENOUS; SUBCUTANEOUS
Status: DISCONTINUED | OUTPATIENT
Start: 2017-10-26 | End: 2017-10-27 | Stop reason: HOSPADM

## 2017-10-26 RX ORDER — SODIUM CHLORIDE, SODIUM LACTATE, POTASSIUM CHLORIDE, CALCIUM CHLORIDE 600; 310; 30; 20 MG/100ML; MG/100ML; MG/100ML; MG/100ML
INJECTION, SOLUTION INTRAVENOUS CONTINUOUS
Status: DISCONTINUED | OUTPATIENT
Start: 2017-10-26 | End: 2017-10-27 | Stop reason: HOSPADM

## 2017-10-26 RX ORDER — SODIUM CHLORIDE, SODIUM LACTATE, POTASSIUM CHLORIDE, CALCIUM CHLORIDE 600; 310; 30; 20 MG/100ML; MG/100ML; MG/100ML; MG/100ML
INJECTION, SOLUTION INTRAVENOUS CONTINUOUS PRN
Status: DISCONTINUED | OUTPATIENT
Start: 2017-10-26 | End: 2017-10-26

## 2017-10-26 RX ADMIN — SODIUM CHLORIDE, SODIUM LACTATE, POTASSIUM CHLORIDE, CALCIUM CHLORIDE: 600; 310; 30; 20 INJECTION, SOLUTION INTRAVENOUS at 09:50

## 2017-10-26 RX ADMIN — SODIUM CHLORIDE, SODIUM LACTATE, POTASSIUM CHLORIDE, CALCIUM CHLORIDE: 600; 310; 30; 20 INJECTION, SOLUTION INTRAVENOUS at 07:17

## 2017-10-26 RX ADMIN — OXYCODONE HYDROCHLORIDE 5 MG: 5 TABLET ORAL at 10:53

## 2017-10-26 RX ADMIN — Medication 10 MG: at 07:56

## 2017-10-26 RX ADMIN — Medication 10 MG: at 08:28

## 2017-10-26 RX ADMIN — CLINDAMYCIN PHOSPHATE 900 MG: 900 INJECTION, SOLUTION INTRAVENOUS at 07:24

## 2017-10-26 RX ADMIN — ACETAMINOPHEN 975 MG: 325 TABLET ORAL at 06:17

## 2017-10-26 RX ADMIN — SODIUM CHLORIDE, SODIUM LACTATE, POTASSIUM CHLORIDE, CALCIUM CHLORIDE: 600; 310; 30; 20 INJECTION, SOLUTION INTRAVENOUS at 06:18

## 2017-10-26 RX ADMIN — Medication 10 MG: at 09:21

## 2017-10-26 RX ADMIN — GABAPENTIN 300 MG: 300 CAPSULE ORAL at 06:17

## 2017-10-26 RX ADMIN — Medication 100 MCG: at 09:38

## 2017-10-26 RX ADMIN — FENTANYL CITRATE 100 MCG: 50 INJECTION, SOLUTION INTRAMUSCULAR; INTRAVENOUS at 07:23

## 2017-10-26 RX ADMIN — LIDOCAINE HYDROCHLORIDE 100 MG: 10 INJECTION, SOLUTION INFILTRATION; PERINEURAL at 07:23

## 2017-10-26 RX ADMIN — Medication 100 MCG: at 08:34

## 2017-10-26 RX ADMIN — Medication 200 MCG: at 07:39

## 2017-10-26 RX ADMIN — Medication 10 MG: at 09:03

## 2017-10-26 RX ADMIN — Medication 50 MG: at 07:24

## 2017-10-26 RX ADMIN — Medication 10 MG: at 10:13

## 2017-10-26 RX ADMIN — Medication 10 MG: at 09:49

## 2017-10-26 RX ADMIN — PROPOFOL: 10 INJECTION, EMULSION INTRAVENOUS at 07:56

## 2017-10-26 RX ADMIN — Medication 10 MG: at 08:36

## 2017-10-26 RX ADMIN — PROPOFOL 200 MCG/KG/MIN: 10 INJECTION, EMULSION INTRAVENOUS at 07:24

## 2017-10-26 RX ADMIN — Medication 100 MCG: at 07:27

## 2017-10-26 RX ADMIN — Medication 10 MG: at 09:36

## 2017-10-26 RX ADMIN — Medication 10 MG: at 08:07

## 2017-10-26 RX ADMIN — ONDANSETRON 4 MG: 2 INJECTION INTRAMUSCULAR; INTRAVENOUS at 10:10

## 2017-10-26 RX ADMIN — ONDANSETRON 4 MG: 2 INJECTION INTRAMUSCULAR; INTRAVENOUS at 10:44

## 2017-10-26 RX ADMIN — DEXAMETHASONE SODIUM PHOSPHATE 10 MG: 4 INJECTION, SOLUTION INTRA-ARTICULAR; INTRALESIONAL; INTRAMUSCULAR; INTRAVENOUS; SOFT TISSUE at 07:26

## 2017-10-26 RX ADMIN — Medication 100 MCG: at 07:26

## 2017-10-26 RX ADMIN — Medication 200 MCG: at 09:10

## 2017-10-26 NOTE — ANESTHESIA POSTPROCEDURE EVALUATION
Patient: Cleveland Rodríguez    Procedure(s):  Microdirect Laryngoscopy with Carbon Dioxide Laser Excision/Ablation of Laryngeal Papilloma, Biopsies - Wound Class: II-Clean Contaminated    Diagnosis:Laryngeal Papilloma  Diagnosis Additional Information: No value filed.    Anesthesia Type:  General    Note:  Anesthesia Post Evaluation    Patient location during evaluation: PACU  Patient participation: Able to fully participate in evaluation  Level of consciousness: awake and alert  Pain management: adequate  Airway patency: patent  Cardiovascular status: acceptable  Respiratory status: acceptable  Hydration status: acceptable  PONV: none     Anesthetic complications: None          Last vitals:  Vitals:    10/26/17 1055 10/26/17 1100 10/26/17 1125   BP: 98/60 95/57 106/63   Resp: 12 13 14   Temp: 36.6  C (97.8  F) 36.6  C (97.8  F) 36.6  C (97.8  F)   SpO2: 97% 97% 97%         Electronically Signed By: Ladarius Silver MD  October 26, 2017

## 2017-10-26 NOTE — BRIEF OP NOTE
[facility]    Brief Operative Note    Pre-operative diagnosis: Laryngeal Papilloma  Post-operative diagnosis * No post-op diagnosis entered *  Procedure: Procedure(s):  Microdirect Laryngoscopy with Carbon Dioxide Laser Excision/Ablation of Laryngeal Papilloma, Biopsies - Wound Class: II-Clean Contaminated  Surgeon: Surgeon(s) and Role:     * Betty Mitchell MD - Primary  Anesthesia: General   Estimated blood loss: Minimal  Drains: None  Specimens:   ID Type Source Tests Collected by Time Destination   A : Right Vocal Fold Mass. Include HPV testing Tissue Vocal Cord SURGICAL PATHOLOGY EXAM, HPV SCR W REF TO ALF ANAL PAP OR TISSUE Betty Mitchell MD 10/26/2017  7:56 AM    B : Right vocal fold mass Tissue Vocal Cord SURGICAL PATHOLOGY EXAM Betty Mitchell MD 10/26/2017  8:45 AM      Findings:   Significant papilloma burden throughout the glottis, right>left. See operative report for full details.  Complications: None.  Implants: None.

## 2017-10-26 NOTE — ANESTHESIA CARE TRANSFER NOTE
Patient: Cleveland Rodríguez    Procedure(s):  Microdirect Laryngoscopy with Carbon Dioxide Laser Excision/Ablation of Laryngeal Papilloma, Biopsies - Wound Class: II-Clean Contaminated    Diagnosis: Laryngeal Papilloma  Diagnosis Additional Information: No value filed.    Anesthesia Type:   General     Note:  Airway :Room Air  Patient transferred to:PACU  Comments: Patient awake and breathing spont. VSS. No complaints of pain or nausea. Report to RN.Handoff Report: Identifed the Patient, Identified the Reponsible Provider, Reviewed the pertinent medical history, Discussed the surgical course, Reviewed Intra-OP anesthesia mangement and issues during anesthesia, Set expectations for post-procedure period and Allowed opportunity for questions and acknowledgement of understanding      Vitals: (Last set prior to Anesthesia Care Transfer)    CRNA VITALS  10/26/2017 1002 - 10/26/2017 1037      10/26/2017             EKG: NSR                Electronically Signed By: NEVILLE Bryant CRNA  October 26, 2017  10:37 AM

## 2017-10-26 NOTE — IP AVS SNAPSHOT
[facility]    [address] [place] [address]    Phone:  [phone]    Fax:  [phone]                                       After Visit Summary   10/26/2017    Cleveland Rodríguez    MRN: [id number]           After Visit Summary Signature Page     I have received my discharge instructions, and my questions have been answered. I have discussed any challenges I see with this plan with the nurse or doctor.    ..........................................................................................................................................  Patient/Patient Representative Signature      ..........................................................................................................................................  Patient Representative Print Name and Relationship to Patient    ..................................................               ................................................  Date                                            Time    ..........................................................................................................................................  Reviewed by Signature/Title    ...................................................              ..............................................  Date                                                            Time

## 2017-10-26 NOTE — OP NOTE
PROCEDURE(S):  Microdirect laryngoscopy with CO2 Laser excision and ablation of laryngeal papillomas  Tracheobronchoscopy    PRE-OPERATIVE DIAGNOSIS:   Recurrent laryngeal papillomas    POST-OPERATIVE DIAGNOSIS:   Recurrent laryngeal papillomas    SURGEON: Betty Mitchell MD MPH    ASSISTANT(S): Fan Melo MD, ENT Resident    ANAESTHESIA: General endotracheal    INDICATIONS FOR PROCEDURE:   Cleveland Rodríguez is a 62 year old year old male with a history of recurrent respiratory papillomatosis who was noted to have significant burden of disease in clinic. He has been treated with numerous procedures in the past. Operative intervention was recommended. After the risks, benefits, and alternatives had been discussed, the patient wished to proceed and presented for the procedure listed above.    DESCRIPTION OF PROCEDURE:   The patient was brought to the operating room and placed supine. A time-out was called to verify patient identity, operative site, and planned procedure. The operative site was prepped in the usual clean fashion. Moist gauze eye pads were placed and secured. A head wrap was placed, and custom molded thermoplastic tooth guards were placed. The patient had his own upper mouth guard that was used throughout the case. Direct laryngoscopy was performed with an Ossoff-Pilling laryngoscope, which was placed in suspension. The vocal folds were examined with 0 and 70 degree telescopes. There was extensive papillomatous disease along the right true cord and right false cord. Portions of the bulky disease were removed with a 2 and 4 mm cup forceps. These specimens were sent to Pathology for further examination.    The operating microscope was then brought into position. Again, there was significant burden of papillomatous disease along the right true vocal cord and false vocal cord. There was also posterior glottic disease and evidence of minor posterior glottic webbing. The left false vocal cord did  have some papillomas along the mid aspect of the cord. We then prepared to use the laser for ablation of the papillomas. Laser safety precautions were utilized at all times, including eye protection for the patient and staff, use of wet towels, and appropriately minimized FiO2. As needed, moistened cottonoids or laser-safe backstops were used to protect the endotracheal tube from the laser. The Lumenis CO2 Accublade laser was attached to the microscope and used at a depth setting of 1 and 8 Souza. The right true vocal fold papillomas were ablated and removed gradually, with care to avoid injury to the vocal ligament and to preserve some superficial lamina propria. Once adequate removal had been achieved, we turned our attention to the left side of the larynx. The papillomas were also ablated on the left true vocal fold, taking care not to make two opposing raw surfaces that could increase the risk of web formation. The endoscope was then used to pass into the subglottis and distal airway after the cuff was deflated to examine for any further disease. The subglottis and distal airway did appear clear down to the leobardo, where the takeoffs of the bilateral mainstem bronchi were visualized. The laryngoscope was then repositioned so that the posterior glottis could be visualized. Again, significant burden of disease was encountered, and the CO2 laser was used to ablate the papillomas. We did leave a cuff of papilloma to in the very posterior aspect to reduce the risk of worsening his mild existing posterior glottic web.    Cottonoids soaked in 1:10,000 epinephrine were sparingly used to maintain hemostasis. As possible, suspension was released to minimize injury to the tongue.    As needed during the procedure and at the conclusion of the procedure, the operating microscope was moved out of position and the 0 and 70 degree rigid endoscopes were again used to examine the vocal folds and confirm completion of the stated  objectives of the procedure. After cottonoid and sponge counts were confirmed correct, 2 cc of 4% lidocaine were applied transglottically for laryngotracheal anesthesia. The laryngoscope and tooth guards were removed. The lips, teeth, and tongue were examined and no injuries were noted. Care of the patient was returned to Anesthesia.      FINDINGS:   1. Extensive papillomatous disease along the right true vocal fold extending from the anterior commissure to the posterior glottis. These were present along the superior and inferior aspect of the vocal fold. Additional papillomas were present on the right false cord. The ventricle appeared to be uninvolved. The laser was used to remove a the papillomas. The right true vocal cord did appear fibrotic laterally consistent with his history of multiple prior procedures and his burden of disease.  2. Left true vocal fold with extensive papillomas along the mid aspect. These were removed with the laser.  3. Additional papillomas present in the right posterior glottis and true vocal cord. A small cuff of papillomas were left undisturbed in the posterior glottis to decrease the risk of further webbing.  4. No further papillomatous disease in the subglottis or distal airway.    SPECIMEN(S):   1. Right vocal fold mass  2. Right vocal fold mass    DRAINS: None    ESTIMATED BLOOD LOSS: Minimal    COMPLICATIONS: None    DISPOSITION: Stable to PACU    Dr. Mitchell was present for the entire procedure.    Fan Melo MD  PGY-4, Otolaryngology    ATTENDING PRESENCE STATEMENT: I was present and participating for all portions of the procedure from beginning to completion.   Betty Mitchell MD MPH  Laryngology staff

## 2017-10-26 NOTE — DISCHARGE INSTRUCTIONS
[facility]  Home Care Following Anesthesia  For 24 hours after surgery:  1. Get plenty of rest.  A responsible adult must stay with you for at least 24 hours after you leave the surgery center.  2. Do not drive or use heavy equipment.  If you have weakness or tingling, don't drive or use heavy equipment until this feeling goes away.   3. Do not drink alcohol.   4. Avoid strenuous or risky activities.  Ask for help when climbing stairs.  5. You may feel lightheaded.  IF so, sit for a few minutes before standing.  Have someone help you get up.   6. If you have nausea (feel sick to your stomach): Drink only clear liquids such as apple juice, ginger ale, broth or 7-Up.  Rest may also help.  Be sure to drink enough fluids.  Move to a regular diet as you feel able.   7. You may have a slight fever.  Call the doctor if your fever is over 100 F (37.7 C) (taken under the tongue) or lasts longer than 24 hours.  8. You may have a dry mouth, a sore throat, muscle aches or trouble sleeping. These should go away after 24 hours.  9. Do not make important or legal decisions.          Tips for taking pain medications  To get the best pain relief possible, remember these points:    Take pain medications as directed, before pain becomes severe.    Pain medication can upset your stomach: taking it with food may help.    Constipation is a common side effect of pain medication. Drink plenty of  fluids.    Eat foods high in fiber. Take a stool softener if recommended by your doctor or pharmacist.    Do not drink alcohol, drive or operate machinery while taking pain medications.    Ask about other ways to control pain, such as with heat, ice or relaxation.    Tylenol/Acetaminophen Consumption  To help encourage the safe use of acetaminophen, the makers of TYLENOL  have lowered the maximum daily dose for single-ingredient Extra Strength TYLENOL  (acetaminophen) products sold in the [place] from 8 pills per  day (4,000 mg) to 6 pills per day (3,000 mg). The dosing interval has also changed from 2 pills every 4-6 hours to 2 pills every 6 hours.    If you feel your pain relief is insufficient, you may take Tylenol/Acetaminophen in addition to your narcotic pain medication.     Be careful not to exceed 3,000 mg of Tylenol/Acetaminophen in a 24 hour period from all sources.    If you are taking extra strength Tylenol/acetaminophen (500 mg), the maximum dose is 6 tablets in 24 hours.    If you are taking regular strength acetaminophen (325 mg), the maximum dose is 9 tablets in 24 hours.    Call a doctor for any of the followin. Signs of infection (fever, growing tenderness at the surgery site, a large amount of drainage or bleeding, severe pain, foul-smelling drainage, redness, swelling).  2. It has been over 8 to 10 hours since surgery and you are still not able to urinate (pass water).  3. Headache for over 24 hours.  Your doctor is:       Dr. Betty Bermudez, ENT Otolaryngology: [phone]               Or dial [phone] and ask for the resident on call for:  ENT Otolaryngology  For emergency care, call the:  [facility]:  [phone] (TTY for hearing impaired: [phone])

## 2017-10-26 NOTE — IP AVS SNAPSHOT
MRN:[id number]                      After Visit Summary   10/26/2017    Cleveland Rodríguez    MRN: [id number]           Thank you!     Thank you for choosing [facility] for your care. Our goal is always to provide you with excellent care. Hearing back from our patients is one way we can continue to improve our services. Please take a few minutes to complete the written survey that you may receive in the mail after you visit with us. Thank you!        Patient Information     Date Of Birth          1955        About your hospital stay     You were admitted on:  October 26, 2017 [facility]    You were discharged on:  October 26, 2017       Who to Call     For medical emergencies, please call 911.  For non-urgent questions about your medical care, please call your primary care provider or clinic, [phone]  For questions related to your surgery, please call your surgery clinic        Attending Provider     Provider Specialty    Betty Mitchell MD Otolaryngology       Primary Care Provider Office Phone # Fax #    Yoel Amaro [phone]      After Care Instructions     Diet Instructions       Resume pre procedure diet            Discharge Instructions       Patient to follow up with surgeon as scheduled            Discharge Instructions - Lifting restrictions       Lifting Restrictions 15 pounds until seen at Post-op follow up appointment.    Absolute voice rest (no talking, whispering, cough/throat-clearing) x 4 days, then gradually resume voice use as instructed by speech pathologist.            No Alcohol       For 24 hours following procedure            No driving or operating machinery       until the day after procedure                  Your next 10 appointments already scheduled     Nov 13, 2017  1:20 PM CST   (Arrive by 1:05 PM)   Return Visit with Betty Mitchell MD   [facility] ([facility])    [address]  4th Floor  [place] [address] [phone]           This is a multi-disciplinary care team visit as patients with your type of problem are usually seen by a team of an MD and a Speech-Language Pathologist (who is a specialist in disorders of the voice, throat, and breathing).  Please plan about 2 hours for your visit, which will likely include Laryngeal Function Studies, a Voice/Swallow/Breathing Evaluation, and an Endoscopic Laryngeal Examination to provide a comprehensive evaluation.  Please check with your insurance company to ensure you are covered for these services. - It is important to know that if you are evaluated and/or treated by both a physician and a speech pathologist during your visit, your billing will reflect the input that you receive from both providers as separate professionals. Although most insurance plans do cover these services, we encourage you to contact your insurance company prior to your visit to determine whether there are any coverage limitations that might affect you financially. - Billing/procedure codes that are frequently associated with visits to our clinic include (but are not limited to) the ones listed below. Most patients will not need all of these items, but it may be useful to ask your insurance company's patient . 73923: Flexible fiberoptic laryngoscopy, 36973: Laryngoscopy; flexible or rigid fiberoptic, with stroboscopy, 84661: Flexible endoscopic evaluation of swallowing, 88374: Laryngeal function aerodynamic evaluation, 14173: Evaluation of Voice and Resonance, 51505: Speech pathology treatment for voice, speech, communication, 72389: Speech pathology treatment for swallowing/oral function for feeding - If you have any concerns or questions, or if you would prefer not to have a speech pathologist involved in your visit, please contact our Clinic Coordinator at [phone], at least 24 hours prior to your appointment.            Nov 17, 2017  2:00 PM CST   (Arrive by 1:45 PM)   Return Visit with JULISSA Morales   [facility] ([facility])    [address] [address] [phone]              Further instructions from your care team       [facility]  Home Care Following Anesthesia  For 24 hours after surgery:  1. Get plenty of rest.  A responsible adult must stay with you for at least 24 hours after you leave the surgery center.  2. Do not drive or use heavy equipment.  If you have weakness or tingling, don't drive or use heavy equipment until this feeling goes away.   3. Do not drink alcohol.   4. Avoid strenuous or risky activities.  Ask for help when climbing stairs.  5. You may feel lightheaded.  IF so, sit for a few minutes before standing.  Have someone help you get up.   6. If you have nausea (feel sick to your stomach): Drink only clear liquids such as apple juice, ginger ale, broth or 7-Up.  Rest may also help.  Be sure to drink enough fluids.  Move to a regular diet as you feel able.   7. You may have a slight fever.  Call the doctor if your fever is over 100 F (37.7 C) (taken under the tongue) or lasts longer than 24 hours.  8. You may have a dry mouth, a sore throat, muscle aches or trouble sleeping. These should go away after 24 hours.  9. Do not make important or legal decisions.          Tips for taking pain medications  To get the best pain relief possible, remember these points:    Take pain medications as directed, before pain becomes severe.    Pain medication can upset your stomach: taking it with food may help.    Constipation is a common side effect of pain medication. Drink plenty of  fluids.    Eat foods high in fiber. Take a stool softener if recommended by your doctor or pharmacist.    Do not drink alcohol, drive or operate machinery while taking  pain medications.    Ask about other ways to control pain, such as with heat, ice or relaxation.    Tylenol/Acetaminophen Consumption  To help encourage the safe use of acetaminophen, the makers of TYLENOL  have lowered the maximum daily dose for single-ingredient Extra Strength TYLENOL  (acetaminophen) products sold in the [place] from 8 pills per day (4,000 mg) to 6 pills per day (3,000 mg). The dosing interval has also changed from 2 pills every 4-6 hours to 2 pills every 6 hours.    If you feel your pain relief is insufficient, you may take Tylenol/Acetaminophen in addition to your narcotic pain medication.     Be careful not to exceed 3,000 mg of Tylenol/Acetaminophen in a 24 hour period from all sources.    If you are taking extra strength Tylenol/acetaminophen (500 mg), the maximum dose is 6 tablets in 24 hours.    If you are taking regular strength acetaminophen (325 mg), the maximum dose is 9 tablets in 24 hours.    Call a doctor for any of the followin. Signs of infection (fever, growing tenderness at the surgery site, a large amount of drainage or bleeding, severe pain, foul-smelling drainage, redness, swelling).  2. It has been over 8 to 10 hours since surgery and you are still not able to urinate (pass water).  3. Headache for over 24 hours.  Your doctor is:       Dr. Betty Bermudez, ENT Otolaryngology: [phone]               Or dial [phone] and ask for the resident on call for:  ENT Otolaryngology  For emergency care, call the:  [facility]:  [phone] (TTY for hearing impaired: [phone])                Pending Results     Date and Time Order Name Status Description    10/26/2017 0759 Surgical pathology exam In process             Admission Information     Date & Time Provider Department Dept. Phone    10/26/2017 Betty Mitchell MD [facility] [phone]      Your Vitals Were     Blood Pressure Temperature Respirations Height  "Weight Pulse Oximetry    95/57 97.8  F (36.6  C) (Temporal) 13 1.88 m (6' 2\") 91.6 kg (202 lb) 97%    BMI (Body Mass Index)                   25.94 kg/m2           MyChart Information     Go[employer] is an electronic gateway that provides easy, online access to your medical records. With GoTo[employer], you can request a clinic appointment, read your test results, renew a prescription or communicate with your care team.     To sign up for [employer] visit the website at [email or web address]   You will be asked to enter the access code listed below, as well as some personal information. Please follow the directions to create your username and password.     Your access code is: [email or web address]  Expires: 12/3/2017  6:30 AM     Your access code will  in 90 days. If you need help or a new code, please contact your [facility] or call [phone] for assistance.        Care EveryWhere ID     This is your Care EveryWhere ID. This could be used by other organizations to access your [facility] medical records  [id number]        Equal Access to Services     CHARLOTTE BRUNER AH: Shabbir Viera, christie giordano, sumit braga, ismael west . So [facility] [phone].    ATENCIÓN: Si habla español, tiene a boles disposición servicios gratuitos de asistencia lingüística. Martin al [phone].    We comply with applicable federal civil rights laws and [place] laws. We do not discriminate on the basis of race, color, national origin, age, disability, sex, sexual orientation, or gender identity.               Review of your medicines      START taking        Dose / Directions    HYDROcodone-acetaminophen 5-325 MG per tablet   Commonly known as:  NORCO   Used for:  Laryngeal papilloma, Dysphonia        Dose:  1-2 tablet   Take 1-2 tablets by mouth every 6 hours as needed for other (Moderate to Severe Pain)   Quantity:  12 tablet   Refills:  0         CONTINUE " these medicines which have NOT CHANGED        Dose / Directions    aspirin 81 MG tablet        Dose:  1 tablet   Take 1 tablet by mouth daily   Refills:  0       * atorvastatin 20 MG tablet   Commonly known as:  LIPITOR   Used for:  Hyperlipidemia LDL goal <130        TAKE 1 TABLET BY MOUTH DAILY   Quantity:  90 tablet   Refills:  0       * atorvastatin 20 MG tablet   Commonly known as:  LIPITOR   Used for:  Hyperlipidemia LDL goal <130        TAKE 1 TABLET BY MOUTH DAILY   Quantity:  90 tablet   Refills:  3       DAILY MULTIVITAMIN PO   Used for:  Routine general medical examination at a health care facility, Polyp of vocal cord or larynx, Degeneration of lumbar or lumbosacral intervertebral disc, Iritis, Hyperlipidemia LDL goal <130        Take  by mouth 2 times daily.   Refills:  0       metroNIDAZOLE 1 % gel   Commonly known as:  METROGEL   Used for:  Rosacea        Dose:  1 g   Apply 1 g topically daily apply hs for Rosacea   Quantity:  60 g   Refills:  11       * Notice:  This list has 2 medication(s) that are the same as other medications prescribed for you. Read the directions carefully, and ask your doctor or other care provider to review them with you.         Where to get your medicines      Some of these will need a paper prescription and others can be bought over the counter. Ask your nurse if you have questions.     Bring a paper prescription for each of these medications     HYDROcodone-acetaminophen 5-325 MG per tablet                Protect others around you: Learn how to safely use, store and throw away your medicines at www.disposemymeds.org.             Medication List: This is a list of all your medications and when to take them. Check marks below indicate your daily home schedule. Keep this list as a reference.      Medications           Morning Afternoon Evening Bedtime As Needed    aspirin 81 MG tablet   Take 1 tablet by mouth daily                                * atorvastatin 20 MG tablet    Commonly known as:  LIPITOR   TAKE 1 TABLET BY MOUTH DAILY                                * atorvastatin 20 MG tablet   Commonly known as:  LIPITOR   TAKE 1 TABLET BY MOUTH DAILY                                DAILY MULTIVITAMIN PO   Take  by mouth 2 times daily.                                HYDROcodone-acetaminophen 5-325 MG per tablet   Commonly known as:  NORCO   Take 1-2 tablets by mouth every 6 hours as needed for other (Moderate to Severe Pain)                                metroNIDAZOLE 1 % gel   Commonly known as:  METROGEL   Apply 1 g topically daily apply hs for Rosacea                                * Notice:  This list has 2 medication(s) that are the same as other medications prescribed for you. Read the directions carefully, and ask your doctor or other care provider to review them with you.

## 2017-10-30 LAB — COPATH REPORT: NORMAL

## 2017-11-02 LAB — COPATH REPORT: NORMAL

## 2017-11-13 ENCOUNTER — OFFICE VISIT (OUTPATIENT)
Dept: OTOLARYNGOLOGY | Facility: CLINIC | Age: 62
End: 2017-11-13

## 2017-11-13 VITALS — BODY MASS INDEX: 25.93 KG/M2 | WEIGHT: 202 LBS | HEIGHT: 74 IN

## 2017-11-13 DIAGNOSIS — Z78.9 RECURRENT RESPIRATORY PAPILLOMATOSIS: Primary | ICD-10-CM

## 2017-11-13 DIAGNOSIS — R49.0 DYSPHONIA: Primary | ICD-10-CM

## 2017-11-13 DIAGNOSIS — Q31.0 LARYNGEAL WEB: ICD-10-CM

## 2017-11-13 DIAGNOSIS — Z78.9 RECURRENT RESPIRATORY PAPILLOMATOSIS: ICD-10-CM

## 2017-11-13 ASSESSMENT — PAIN SCALES - GENERAL: PAINLEVEL: NO PAIN (0)

## 2017-11-13 NOTE — PROGRESS NOTES
Dear Colleague:  Cleveland Rodríguez recently returned for follow-up at the [facility]. My clinic note from our visit is enclosed below.     I appreciate the ongoing opportunity to participate in this patient's care.    Please feel free to contact me with any questions.      Sincerely yours,  Betty Mitchell M.D., M.P.H.  , Laryngology  Director, [facility]  Otolaryngology- Head & Neck Surgery  [phone]            =====  HISTORY OF PRESENT ILLNESS:  Cleveland Rodríguez is a pleasant 62 year old male with recurrent respiratory papillomatosis (RRP), HPV6+, who returns in follow up today. He has undergone numerous prior surgeries for RRP, and most recently had his first surgery with me 10/26/17. Pathology was negative for high grade dysplasia/malignancy. No problems post-operatively.      MEDICATIONS:     Current Outpatient Prescriptions   Medication Sig Dispense Refill     HYDROcodone-acetaminophen (NORCO) 5-325 MG per tablet Take 1-2 tablets by mouth every 6 hours as needed for other (Moderate to Severe Pain) 12 tablet 0     atorvastatin (LIPITOR) 20 MG tablet TAKE 1 TABLET BY MOUTH DAILY 90 tablet 3     atorvastatin (LIPITOR) 20 MG tablet TAKE 1 TABLET BY MOUTH DAILY 90 tablet 0     aspirin 81 MG tablet Take 1 tablet by mouth daily        metroNIDAZOLE (METROGEL) 1 % gel Apply 1 g topically daily apply hs for Rosacea 60 g 11     Multiple Vitamin (DAILY MULTIVITAMIN PO) Take  by mouth 2 times daily.         ALLERGIES:    Allergies   Allergen Reactions     Amoxicillin      Rash         NEW PMH/PSH: None    REVIEW OF SYSTEMS:  The patient completed a comprehensive 11 point review of systems (below), which was reviewed. Positives are as noted below.  Patient Supplied Answers to Review of Systems   ENT ROS 11/13/2017   Ears, Nose, Throat Hoarseness   Cardiopulmonary Cough       PHYSICAL EXAM:  General: The patient was alert and conversant, and in no  acute distress.    Oral cavity/oropharynx: No masses or lesions. Dentition unchanged since prior. Tongue mobility and palate elevation intact and symmetric.  Neck: No palpable cervical lymphadenopathy, mild tenderness to palpation of the thyrohyoid space, which was not narrow. No obvious thyroid abnormality.  Resp: Breathing comfortably, no stridor or stertor.  Neuro: Symmetric facial function. Other cranial nerve function as documented above.  Psych: Normal affect, pleasant and cooperative.  Voice/speech: Mild to moderate dysphonia characterized by breathiness, roughness and strain.      Intake scores  Last 2 Scores for Patient-Answered VHI Questionnaire  VHI Total Score 9/18/2017 11/13/2017   VHI Total Score 25 10      Last 2 Scores for Patient-Answered EAT Questionnaire  EAT Total Score 11/13/2017   EAT Total Score 1      Last 2 Scores for Patient-Answered CSI Questionnaire  CSI Total Score 11/13/2017   CSI Total Score 1       Procedure:   Flexible fiberoptic laryngoscopy and laryngovideostroboscopy  Indications: This procedure was warranted to evaluate the patient's laryngeal anatomy and function. Risks, benefits, and alternatives were discussed.  Description: After written informed consent was obtained, a time-out was performed to confirm patient identity, procedure, and procedure site. Topical 3% lidocaine with 0.25% phenylephrine was applied to the nasal cavities. I performed the endoscopy and no complications were apparent. Continuous and stroboscopic light were utilized to assess routine phonation and variable frequency phonation.  Performed by: Betty Mitchell MD MPH  SLP: Ermias Meza MM, MA, CCC-SLP  Findings: Normal nasopharynx. Normal base of tongue, valleculae, and epiglottis. Vocal fold mobility: right: normal; left: normal. Medial edges of the vocal folds: straight overall with mild irregularity/thickening. No focal mucosal lesions were observed on the true vocal folds. Glissade with limited  elongation due to self-imposed pitch ceiling/limited pitch perception. There was mild to moderate supraglottic recruitment with connected speech. Mucosa of false vocal folds, aryepiglottic folds, piriform sinuses, and posterior glottis unremarkable with the exception of superficial papilloma, bilateral false vocal folds. Airway was patent. 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 Response to the therapy probes was good. The addition of Narrow Band Imaging (NBI) highlighted the false vocal fold findings.    The addition of stroboscopy allowed evaluation of the mucosal wave.   Amplitude: right: mild to moderately decreased; left: mildly decreased. Symmetry: intermittent symmetry. Closure pattern: complete and irregular. Closure plane: at glottic level. Phase distribution: normal.      IMPRESSION AND PLAN:   Cleveland Rodríguez returns with a good post-op result to date. We discussed HPV vaccination again and he is going to discuss with his primary care provider. He will let me know if he would like us to send a letter of medical support to insurance that includes information on peer-reviewed literature suggesting a possible role for HPV vaccination in patients with RRP. I encouraged him to continue speech therapy; I will see him back in 2-3 months or sooner as needed. I appreciate the opportunity to participate in the care of this pleasant patient.

## 2017-11-13 NOTE — MR AVS SNAPSHOT
After Visit Summary   11/13/2017    Cleveland Rodríguez    MRN: [id number]           Patient Information     Date Of Birth          1955        Visit Information        Provider Department      11/13/2017 1:20 PM Betty Mitchell MD [facility] Ear Nose and Throat        Today's Diagnoses     Recurrent respiratory papillomatosis    -  1    Laryngeal web          Care Instructions    1.  You were seen in the ENT Clinic today by Dr. Mitchell.  If you have any questions or concerns after your appointment, please call [phone].  Press option #1 for scheduling related needs.  Press option #3 for Nurse advice.  2.  Plan is to return to clinic in 2-3 months for follow up.      Elizabeth MCMILLAN, RN  [facility]   Head & Neck Surgery               Follow-ups after your visit        Additional Services     OTOLARYNGOLOGY REFERRAL       SPEECH-LANGUAGE PATHOLOGY SERVICE(S) REQUESTED:  Evaluate and treat    Magy Guthrie, Ph.D., CCC/SLP  Speech-Language Pathologist  Director, [facility]  [phone]    Nelli Mccloud M.M., M.A., CCC/SLP  Speech-Language Pathologist  [facility]  [phone]                  Your next 10 appointments already scheduled     Nov 17, 2017  2:00 PM CST   (Arrive by 1:45 PM)   Return Visit with JULISSA Morales   [facility] ([facility])    [address] [place] [address] [phone]              Who to contact     Please call your clinic at [phone] to:    Ask questions about your health    Make or cancel appointments    Discuss your medicines    Learn about your test results    Speak to your doctor   If you have compliments or concerns about an experience at your clinic, or if you wish to file a complaint, please contact [facility] Patient Relations at [phone] or email us at [email or web address]         Additional Information  "About Your Visit        Mosaichart Information     Event[employer] is an electronic gateway that provides easy, online access to your medical records. With Event[employer], you can request a clinic appointment, read your test results, renew a prescription or communicate with your care team.     To sign up for Eventfinda visit the website at [email or web address]   You will be asked to enter the access code listed below, as well as some personal information. Please follow the directions to create your username and password.     Your access code is: [email or web address]  Expires: 12/3/2017  5:30 AM     Your access code will  in 90 days. If you need help or a new code, please contact your [facility] or call [phone] for assistance.        Care EveryWhere ID     This is your Care EveryWhere ID. This could be used by other organizations to access your [facility] medical records  [id number]        Your Vitals Were     Height BMI (Body Mass Index)                1.88 m (6' 2.02\") 25.92 kg/m2           Blood Pressure from Last 3 Encounters:   10/26/17 106/63   10/03/17 102/78   16 104/66    Weight from Last 3 Encounters:   17 91.6 kg (202 lb)   10/26/17 91.6 kg (202 lb)   10/03/17 91.6 kg (202 lb)              We Performed the Following     IMAGESTREAM RECORDING ORDER     LARYNGOSCOPY FLEX/RIGID W STROBOSCOPY     OTOLARYNGOLOGY REFERRAL        Primary Care Provider Office Phone # Fax #    Yoel Cody Amaro [phone]       [address]  [facility] [address]        Equal Access to Services     ROSELYN BRUNER AH: Hadii nubia Viera, waaxda luqadaha, qaybta kaalismael [place]. So [facility] [phone].    ATENCIÓN: Si habla español, tiene a boles disposición servicios gratuitos de asistencia lingüística. Martin al [phone].    We comply with applicable federal civil rights laws and [place] laws. We do not discriminate on the basis of " race, color, national origin, age, disability, sex, sexual orientation, or gender identity.            Thank you!     Thank you for choosing [facility] EAR NOSE AND THROAT  for your care. Our goal is always to provide you with excellent care. Hearing back from our patients is one way we can continue to improve our services. Please take a few minutes to complete the written survey that you may receive in the mail after your visit with us. Thank you!             Your Updated Medication List - Protect others around you: Learn how to safely use, store and throw away your medicines at www.disposemymeds.org.          This list is accurate as of: 11/13/17  4:56 PM.  Always use your most recent med list.                   Brand Name Dispense Instructions for use Diagnosis    aspirin 81 MG tablet      Take 1 tablet by mouth daily        * atorvastatin 20 MG tablet    LIPITOR    90 tablet    TAKE 1 TABLET BY MOUTH DAILY    Hyperlipidemia LDL goal <130       * atorvastatin 20 MG tablet    LIPITOR    90 tablet    TAKE 1 TABLET BY MOUTH DAILY    Hyperlipidemia LDL goal <130       DAILY MULTIVITAMIN PO      Take  by mouth 2 times daily.    Routine general medical examination at a health care facility, Polyp of vocal cord or larynx, Degeneration of lumbar or lumbosacral intervertebral disc, Iritis, Hyperlipidemia LDL goal <130       HYDROcodone-acetaminophen 5-325 MG per tablet    NORCO    12 tablet    Take 1-2 tablets by mouth every 6 hours as needed for other (Moderate to Severe Pain)    Laryngeal papilloma, Dysphonia       metroNIDAZOLE 1 % gel    METROGEL    60 g    Apply 1 g topically daily apply hs for Rosacea    Rosacea       * Notice:  This list has 2 medication(s) that are the same as other medications prescribed for you. Read the directions carefully, and ask your doctor or other care provider to review them with you.

## 2017-11-13 NOTE — LETTER
11/13/2017      RE: Cleveland Rodríguez  [address] [place] [address]       Dear Colleague:  Cleveland Rodríguez recently returned for follow-up at the [facility]. My clinic note from our visit is enclosed below.     I appreciate the ongoing opportunity to participate in this patient's care.    Please feel free to contact me with any questions.      Sincerely yours,  Betty Mitchell M.D., M.P.H.  , Laryngology  Director, [facility]  Otolaryngology- Head & Neck Surgery  [phone]            =====  HISTORY OF PRESENT ILLNESS:  Cleveland Rodríguez is a pleasant 62 year old male with recurrent respiratory papillomatosis (RRP), HPV6+, who returns in follow up today. He has undergone numerous prior surgeries for RRP, and most recently had his first surgery with me 10/26/17. Pathology was negative for high grade dysplasia/malignancy. No problems post-operatively.      MEDICATIONS:     Current Outpatient Prescriptions   Medication Sig Dispense Refill     HYDROcodone-acetaminophen (NORCO) 5-325 MG per tablet Take 1-2 tablets by mouth every 6 hours as needed for other (Moderate to Severe Pain) 12 tablet 0     atorvastatin (LIPITOR) 20 MG tablet TAKE 1 TABLET BY MOUTH DAILY 90 tablet 3     atorvastatin (LIPITOR) 20 MG tablet TAKE 1 TABLET BY MOUTH DAILY 90 tablet 0     aspirin 81 MG tablet Take 1 tablet by mouth daily        metroNIDAZOLE (METROGEL) 1 % gel Apply 1 g topically daily apply hs for Rosacea 60 g 11     Multiple Vitamin (DAILY MULTIVITAMIN PO) Take  by mouth 2 times daily.         ALLERGIES:    Allergies   Allergen Reactions     Amoxicillin      Rash         NEW PMH/PSH: None    REVIEW OF SYSTEMS:  The patient completed a comprehensive 11 point review of systems (below), which was reviewed. Positives are as noted below.  Patient Supplied Answers to Review of Systems  [facility] ENT ROS 11/13/2017   Ears, Nose, Throat Hoarseness   Cardiopulmonary Cough        PHYSICAL EXAM:  General: The patient was alert and conversant, and in no acute distress.    Oral cavity/oropharynx: No masses or lesions. Dentition unchanged since prior. Tongue mobility and palate elevation intact and symmetric.  Neck: No palpable cervical lymphadenopathy, mild tenderness to palpation of the thyrohyoid space, which was not narrow. No obvious thyroid abnormality.  Resp: Breathing comfortably, no stridor or stertor.  Neuro: Symmetric facial function. Other cranial nerve function as documented above.  Psych: Normal affect, pleasant and cooperative.  Voice/speech: Mild to moderate dysphonia characterized by breathiness, roughness and strain.      Intake scores  Last 2 Scores for Patient-Answered VHI Questionnaire  VHI Total Score 9/18/2017 11/13/2017   VHI Total Score 25 10      Last 2 Scores for Patient-Answered EAT Questionnaire  EAT Total Score 11/13/2017   EAT Total Score 1      Last 2 Scores for Patient-Answered CSI Questionnaire  CSI Total Score 11/13/2017   CSI Total Score 1       Procedure:   Flexible fiberoptic laryngoscopy and laryngovideostroboscopy  Indications: This procedure was warranted to evaluate the patient's laryngeal anatomy and function. Risks, benefits, and alternatives were discussed.  Description: After written informed consent was obtained, a time-out was performed to confirm patient identity, procedure, and procedure site. Topical 3% lidocaine with 0.25% phenylephrine was applied to the nasal cavities. I performed the endoscopy and no complications were apparent. Continuous and stroboscopic light were utilized to assess routine phonation and variable frequency phonation.  Performed by: Betty Mitchell MD MPH  SLP: Ermias Meza MM, MA, CCC-SLP  Findings: Normal nasopharynx. Normal base of tongue, valleculae, and epiglottis. Vocal fold mobility: right: normal; left: normal. Medial edges of the vocal folds: straight overall with mild irregularity/thickening. No  focal mucosal lesions were observed on the true vocal folds. Glissade with limited elongation due to self-imposed pitch ceiling/limited pitch perception. There was mild to moderate supraglottic recruitment with connected speech. Mucosa of false vocal folds, aryepiglottic folds, piriform sinuses, and posterior glottis unremarkable with the exception of superficial papilloma, bilateral false vocal folds. Airway was patent. 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 Response to the therapy probes was good. The addition of Narrow Band Imaging (NBI) highlighted the false vocal fold findings.    The addition of stroboscopy allowed evaluation of the mucosal wave.   Amplitude: right: mild to moderately decreased; left: mildly decreased. Symmetry: intermittent symmetry. Closure pattern: complete and irregular. Closure plane: at glottic level. Phase distribution: normal.      IMPRESSION AND PLAN:   Cleveland Rodríguez returns with a good post-op result to date. We discussed HPV vaccination again and he is going to discuss with his primary care provider. He will let me know if he would like us to send a letter of medical support to insurance that includes information on peer-reviewed literature suggesting a possible role for HPV vaccination in patients with RRP. I encouraged him to continue speech therapy; I will see him back in 2-3 months or sooner as needed. I appreciate the opportunity to participate in the care of this pleasant patient.          Betty Mitchell MD

## 2017-11-13 NOTE — MR AVS SNAPSHOT
After Visit Summary   2017    Cleveland Rodríguez    MRN: [id number]           Patient Information     Date Of Birth          1955        Visit Information        Provider Department      2017 1:20 PM Segundo Meza SLP [facility]        Today's Diagnoses     Dysphonia    -  1    Recurrent respiratory papillomatosis           Follow-ups after your visit        Your next 10 appointments already scheduled     2017  2:00 PM CST   (Arrive by 1:45 PM)   Return Visit with JULISSA Morales [facility] ([facility])    [address] [place] [address] [phone]              Who to contact     Please call your clinic at [phone] to:    Ask questions about your health    Make or cancel appointments    Discuss your medicines    Learn about your test results    Speak to your doctor   If you have compliments or concerns about an experience at your clinic, or if you wish to file a complaint, please contact [facility] Patient Relations at [phone] or email us at [email or web address]         Additional Information About Your Visit        MyChart Information     [employer] is an electronic gateway that provides easy, online access to your medical records. With [employer], you can request a clinic appointment, read your test results, renew a prescription or communicate with your care team.     To sign up for Plot Project[employer] visit the website at [email or web address]   You will be asked to enter the access code listed below, as well as some personal information. Please follow the directions to create your username and password.     Your access code is: [email or web address]  Expires: 12/3/2017  5:30 AM     Your access code will  in 90 days. If you need help or a new code, please contact your [facility] or call [phone] for assistance.        Care EveryWhere ID      This is your Care EveryWhere ID. This could be used by other organizations to access your [facility] medical records  [id number]         Blood Pressure from Last 3 Encounters:   10/26/17 106/63   10/03/17 102/78   09/08/16 104/66    Weight from Last 3 Encounters:   11/13/17 91.6 kg (202 lb)   10/26/17 91.6 kg (202 lb)   10/03/17 91.6 kg (202 lb)              We Performed the Following     [facility] BEHAVIORAL & QUALITATIVE ANALYSIS VOICE AND RESONANCE        Primary Care Provider Office Phone # Fax #    Yoel Amaro [phone]       [address]  [facility] [address]        Equal Access to Services     CHARLOTTE BRUNER : Hadii nubia nolando Soamena, waaxda luqadaha, qaybta kaalmada adeegyada, ismael west . So [facility] [phone].    ATENCIÓN: Si habla español, tiene a boles disposición servicios gratuitos de asistencia lingüística. Llame al [phone].    We comply with applicable federal civil rights laws and [place] laws. We do not discriminate on the basis of race, color, national origin, age, disability, sex, sexual orientation, or gender identity.            Thank you!     Thank you for choosing [facility]  for your care. Our goal is always to provide you with excellent care. Hearing back from our patients is one way we can continue to improve our services. Please take a few minutes to complete the written survey that you may receive in the mail after your visit with us. Thank you!             Your Updated Medication List - Protect others around you: Learn how to safely use, store and throw away your medicines at www.disposemymeds.org.          This list is accurate as of: 11/13/17  4:29 PM.  Always use your most recent med list.                   Brand Name Dispense Instructions for use Diagnosis    aspirin 81 MG tablet      Take 1 tablet by mouth daily        * atorvastatin 20 MG tablet    LIPITOR    90 tablet    TAKE 1 TABLET BY MOUTH DAILY    Hyperlipidemia LDL  goal <130       * atorvastatin 20 MG tablet    LIPITOR    90 tablet    TAKE 1 TABLET BY MOUTH DAILY    Hyperlipidemia LDL goal <130       DAILY MULTIVITAMIN PO      Take  by mouth 2 times daily.    Routine general medical examination at a health care facility, Polyp of vocal cord or larynx, Degeneration of lumbar or lumbosacral intervertebral disc, Iritis, Hyperlipidemia LDL goal <130       HYDROcodone-acetaminophen 5-325 MG per tablet    NORCO    12 tablet    Take 1-2 tablets by mouth every 6 hours as needed for other (Moderate to Severe Pain)    Laryngeal papilloma, Dysphonia       metroNIDAZOLE 1 % gel    METROGEL    60 g    Apply 1 g topically daily apply hs for Rosacea    Rosacea       * Notice:  This list has 2 medication(s) that are the same as other medications prescribed for you. Read the directions carefully, and ask your doctor or other care provider to review them with you.

## 2017-11-13 NOTE — NURSING NOTE
Procedure: Upper aerodigestive tract endoscopy, Flexible or rigid laryngoscopy, possible stroboscopy, possible flexible endoscopic evaluation of swallowing   Reason: symptoms requiring examination   PREPROCEDURE:   Yes Patient ID verified with 2 identifiers (name and  or MRN)   Yes: Procedure and site verified with patient/designee (when able)   Yes: Accurate consent documentation in medical record   No: Marking not required. Reason: [ Procedure does not require site marking. ][ Provider is in continuous attendance with the patient from consent through completion of procedure. ][ Marking unable or refused by patient (see scanned diagram).   TIME OUT:   Yes: Time-Out performed immediately prior to starting procedure, including verbal and active participation of all team members addressing:   * Correct patient identity   * Confirmed that the correct side and site are marked   * An accurate procedure consent form   * Agreement on the procedure to be done   * Correct patient position   * Relevant images and results are properly labeled and appropriately displayed   * The need to administer antibiotics or fluids for irrigation purposes during the procedure as applicable   * Safety precations based on patient history or medication use   DURING PROCEDURE: Verification of correct person, site, and procedure occurs any time the responsibility for care of the patient is transferred to another member of the care team.   Elizabeth Vargas RN  P Otolaryngology/Head & Neck Surgery

## 2017-11-13 NOTE — PROGRESS NOTES
"[facility] VOICE CLINIC  Bhavin Harvey Jr., M.D., F.A.C.S.  Betty Mitchell M.D., M.P.H.  Magy Guthrie, Ph.D., CCC/SLP  Nelli Mccloud M.M. (voice), MASHLEY., CCC/SLP  Segundo Meza M.M. (voice), MASHLEY., CCC/SLP    Patient: Cleveland Rodríguez  Date of Visit: 11/13/2017    CHIEF COMPLAINT: Voice quality    HISTORY  Cleveland Rodríguez is a 62 year old year old gentleman with a history of recurrent respiratory papillomatosis and resultant dysphonia, who was seen for follow-up evaluation in conjunction with a visit to Dr. Mitchell. He was seen last on 9/18/17 for evaluation.  Please refer to that report for full details. He underwent CO2 laser excision of the papilloma on 10/26/17 and returns today for 2 week follow-up evaluation    INTERIM HISTORY:    Good adherence to voice rest and slow ramp-up    Practicing exercises 4-5 times per day  o Sustain and ascending glides    Notes intermittent times of hoarseness, or occsainal words when the voice \"wont\" come out, but then it is back to normal    Trying to suppress cough when possible, but finding this is more challenging after resuming speaking    OBJECTIVE  PATIENT REPORTED MEASURES    Effort to talk: 0 / 10 (0-10 in which 10 represents maximal effort)    Voice quality: 7 / 10 (0-10 in which 10 represents best possible voice)  Patient Supplied Answers To Last 2 VHI Questionnaires  Voice Handicap Index (VHI-10) 11/13/2017   My voice makes it difficult for people to hear me 2   People have difficulty understanding me in a noisy room 2   My voice difficulties restrict my personal and social life.  2   I feel left out of conversations because of my voice 1   My voice problem causes me to lose income 0   I feel as though I have to strain to produce voice 2   The clarity of my voice is unpredictable 2   My voice problem upsets me 1   My voice makes me feel handicapped 0   People ask, \"What's wrong with your voice?\" 1   VHI-10 13        Patient Supplied Answers To CSI " "Questionnaire  Cough Severity Index (CSI) 11/13/2017   My cough is worse when I lie down. Almost never   My coughing problem causes me to restrict my personal and social life. Never   I tend to avoid places because of my cough problem. Never   I feel embarrassed because of my coughing problem. Never   People ask, \"What's wrong?\" because I cough a lot. Never   I run out of air when I cough. Never   My coughing problem affects my voice. Never   My coughing problem limits my physical activity. Never   My coughing problem upsets me. Never   People ask me if I am sick because I cough a lot. Never   CSI Total Score 1        Patient Supplied Answers To EAT Questionnaire  Eating Assessment Tool (EAT-10) 11/13/2017   My swallowing problem has caused me to lose weight. 0   My swallowing problem interferes with my ability to go out for meals. 0   Swallowing solids takes extra effort. 0   Swallowing pills takes extra effort. 0   Swallowing is painful. 0   The pleasure of eating is affected by my swallowing. 0   When I swallow food sticks in my throat. 0   I cough when I eat. 1   Swallowing is stressful. 0   How often do things go down the wrong way when you swallow? Rarely   Have you had pneumonia, bronchitis, or another severe lung infection in the last 6 months? No   EAT Total Score 1     PERCEPTUAL EVALUATION (CPT 16876)  POSTURE / TENSION:     no overt tension    BREATHING:     appears within normal limits and adequate     phonation is not coordinated with respiration    VOICE:    Roughness: Mild to moderate Consistent    Breathiness: Minimal    Strain: Mild to moderate Intermittent    Loudness    Conversational speech:  WNL    Pitch:    Conversational speech:  Mildly lowered    Pitch glide: Patient demonstrates difficulty accessing any pitches above modal register    Resonance:    Conversational speech:  backward focus of resonance and laryngeal pharyngeal resonance    CAPE-V Overall Severity: 53/100    COUGH/THROAT " CLEARING:    Occasional    THERAPY PROBES: Improvement was elicited with use of forward resonant stimuli and coordination of respiration and phonation    ACOUSTICS:  /a/ mean f0 = 130.582 (SD = 1.237)  /i/ mean f0 = 137.828 (SD = 1.389)  Glide:     Lowest f0 = 98.143     Higest f0 = 238.159     Range = 140.016  Connected Speech     Mean f0 = 147.928 (SD 18.078)     Median f0 = 147.233     Lowest f0 = 84.361     Highest f0 = 249.042     Speaking Range = 164.681        LARYNGEAL EXAMINATION  Procedure: Flexible endoscopy with chip-tip technology with stroboscopy, left nostril; topical anesthesia with 3% Lidocaine and 0.25% phenylephrine was applied.   Performed by: Dr. Mitchell   The laryngeal and pharyngeal structures were evaluated for gross appearance, mobility, function, and focal lesions / abnormalities of the associated mucosa.  Stroboscopy was warranted to evaluate closure, symmetry, and vibratory characteristics of the vocal folds.  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  Frequent but inconsistent asymmetry noted. Complete clousre       NBI of vocal folds        The laryngeal exam was reviewed with Mr. Rodríguez, and I provided pertinent explanations, as well as written and oral information.    ASSESSMENT / PLAN  IMPRESSIONS: Gaurav  Marcos presents today with  R49.0 (Dysphonia)in the context of  an imbalance in function of the intrinsic and extrinsic muscles of the larynx and Z78.9 (recurrent respiratory papillomatosis) .  Reduced presence of papilloma was noted on today's examination with appropriate healing post-operatively.  Significant supraglottic hyperfunction was also visualized, and was noted to respond positively to therapeutic probes.    RECOMMENDATIONS:     Ongoing medically necessary speech therapy continues to be warranted optimize vocal technique, improve voice quality and optimize surgical results    Continue with current plan of care    This treatment plan was developed with the patient who agreed with the recommendations.    PRIMARY ICD-10 code:  R49.0 (Dysphonia)  SECONDARY ICD-10 code:  Z78.9 (recurrent respiratory papillomatosis)     TOTAL SERVICE TIME: 50 minutes  EVALUATION OF VOICE AND RESONANCE: (69119): 50 minutes    NO CHARGE FACILITY FEE (65298)    Ermias Meza M.M., M.A., CCC-SLP  Speech-Language Pathologist  Certificate of Vocology  [phone]

## 2017-11-13 NOTE — PATIENT INSTRUCTIONS
1.  You were seen in the ENT Clinic today by Dr. Mitchell.  If you have any questions or concerns after your appointment, please call [phone].  Press option #1 for scheduling related needs.  Press option #3 for Nurse advice.  2.  Plan is to return to clinic in 2-3 months for follow up.      Elizabeth MCMILLAN, RN  [facility]   Head & Neck Surgery

## 2017-11-13 NOTE — LETTER
"11/13/2017      RE: Cleveland Rodríguez  [address] [place] [address]       [facility] VOICE CLINIC  Bhavin Harvey Jr., M.D., F.A.C.S.  Betty Mitchell M.D., M.P.H.  Magy Guthrie, Ph.D., CCC/SLP  Nelli Mccloud M.M. (voice), M.A., CCC/SLP  Segundo Meza M.M. (voice), M.A., CCC/SLP    Patient: Cleveland Rodríguez  Date of Visit: 11/13/2017    CHIEF COMPLAINT: Voice quality    HISTORY  Cleveland Rodríguez is a 62 year old year old gentleman with a history of recurrent respiratory papillomatosis and resultant dysphonia, who was seen for follow-up evaluation in conjunction with a visit to Dr. Mitchell. He was seen last on 9/18/17 for evaluation.  Please refer to that report for full details. He underwent CO2 laser excision of the papilloma on 10/26/17 and returns today for 2 week follow-up evaluation    INTERIM HISTORY:    Good adherence to voice rest and slow ramp-up    Practicing exercises 4-5 times per day  o Sustain and ascending glides    Notes intermittent times of hoarseness, or occsainal words when the voice \"wont\" come out, but then it is back to normal    Trying to suppress cough when possible, but finding this is more challenging after resuming speaking    OBJECTIVE  PATIENT REPORTED MEASURES    Effort to talk: 0 / 10 (0-10 in which 10 represents maximal effort)    Voice quality: 7 / 10 (0-10 in which 10 represents best possible voice)  Patient Supplied Answers To Last 2 VHI Questionnaires  Voice Handicap Index (VHI-10) 11/13/2017   My voice makes it difficult for people to hear me 2   People have difficulty understanding me in a noisy room 2   My voice difficulties restrict my personal and social life.  2   I feel left out of conversations because of my voice 1   My voice problem causes me to lose income 0   I feel as though I have to strain to produce voice 2   The clarity of my voice is unpredictable 2   My voice problem upsets me 1   My voice makes me feel handicapped 0   People ask, \"What's " "wrong with your voice?\" 1   VHI-10 13        Patient Supplied Answers To CSI Questionnaire  Cough Severity Index (CSI) 11/13/2017   My cough is worse when I lie down. Almost never   My coughing problem causes me to restrict my personal and social life. Never   I tend to avoid places because of my cough problem. Never   I feel embarrassed because of my coughing problem. Never   People ask, \"What's wrong?\" because I cough a lot. Never   I run out of air when I cough. Never   My coughing problem affects my voice. Never   My coughing problem limits my physical activity. Never   My coughing problem upsets me. Never   People ask me if I am sick because I cough a lot. Never   CSI Total Score 1        Patient Supplied Answers To EAT Questionnaire  Eating Assessment Tool (EAT-10) 11/13/2017   My swallowing problem has caused me to lose weight. 0   My swallowing problem interferes with my ability to go out for meals. 0   Swallowing solids takes extra effort. 0   Swallowing pills takes extra effort. 0   Swallowing is painful. 0   The pleasure of eating is affected by my swallowing. 0   When I swallow food sticks in my throat. 0   I cough when I eat. 1   Swallowing is stressful. 0   How often do things go down the wrong way when you swallow? Rarely   Have you had pneumonia, bronchitis, or another severe lung infection in the last 6 months? No   EAT Total Score 1     PERCEPTUAL EVALUATION (CPT 52816)  POSTURE / TENSION:     no overt tension    BREATHING:     appears within normal limits and adequate     phonation is not coordinated with respiration    VOICE:    Roughness: Mild to moderate Consistent    Breathiness: Minimal    Strain: Mild to moderate Intermittent    Loudness    Conversational speech:  WNL    Pitch:    Conversational speech:  Mildly lowered    Pitch glide: Patient demonstrates difficulty accessing any pitches above modal register    Resonance:    Conversational speech:  backward focus of resonance and laryngeal " pharyngeal resonance    CAPE-V Overall Severity: 53/100    COUGH/THROAT CLEARING:    Occasional    THERAPY PROBES: Improvement was elicited with use of forward resonant stimuli and coordination of respiration and phonation    ACOUSTICS:  /a/ mean f0 = 130.582 (SD = 1.237)  /i/ mean f0 = 137.828 (SD = 1.389)  Glide:     Lowest f0 = 98.143     Higest f0 = 238.159     Range = 140.016  Connected Speech     Mean f0 = 147.928 (SD 18.078)     Median f0 = 147.233     Lowest f0 = 84.361     Highest f0 = 249.042     Speaking Range = 164.681        LARYNGEAL EXAMINATION  Procedure: Flexible endoscopy with chip-tip technology with stroboscopy, left nostril; topical anesthesia with 3% Lidocaine and 0.25% phenylephrine was applied.   Performed by: Dr. Mitchell   The laryngeal and pharyngeal structures were evaluated for gross appearance, mobility, function, and focal lesions / abnormalities of the associated mucosa.  Stroboscopy was warranted to evaluate closure, symmetry, and vibratory characteristics of the vocal folds.  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  Frequent but inconsistent asymmetry noted. Complete clousre       NBI of vocal folds        The laryngeal exam was reviewed with Mr. Rodríguez, and I provided pertinent explanations, as well as  written and oral information.    ASSESSMENT / PLAN  IMPRESSIONS: Mr. 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  Reduced presence of papilloma was noted on today's examination with appropriate healing post-operatively.  Significant supraglottic hyperfunction was also visualized, and was noted to respond positively to therapeutic probes.    RECOMMENDATIONS:     Ongoing medically necessary speech therapy continues to be warranted optimize vocal technique, improve voice quality and optimize surgical results    Continue with current plan of care    This treatment plan was developed with the patient who agreed with the recommendations.    PRIMARY ICD-10 code:  R49.0 (Dysphonia)  SECONDARY ICD-10 code:  Z78.9 (recurrent respiratory papillomatosis)     TOTAL SERVICE TIME: 50 minutes  EVALUATION OF VOICE AND RESONANCE: (77530): 50 minutes    NO CHARGE FACILITY FEE (26609)    Ermias Meza M.M., M.A., CCC-SLP  Speech-Language Pathologist  Certificate of Vocology  [phone]

## 2017-11-17 ENCOUNTER — OFFICE VISIT (OUTPATIENT)
Dept: OTOLARYNGOLOGY | Facility: CLINIC | Age: 62
End: 2017-11-17

## 2017-11-17 DIAGNOSIS — Z78.9 RECURRENT RESPIRATORY PAPILLOMATOSIS: ICD-10-CM

## 2017-11-17 DIAGNOSIS — R49.0 DYSPHONIA: Primary | ICD-10-CM

## 2017-11-17 NOTE — LETTER
"11/17/2017      RE: Cleveland Rodríguez  [address] [place] [address]       [facility] VOICE CLINIC  THERAPY NOTE (CPT 51057)    Patient: Cleveland Rodríguez  Date of Service: 11/17/2017  Referring physician: Dr. Mitchell  Impressions from most recent evaluation:  \"Mr. 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  Reduced presence of papilloma was noted on today's examination with appropriate healing post-operatively.  Significant supraglottic hyperfunction was also visualized, and was noted to respond positively to therapeutic probes.\"    SUBJECTIVE:  Since his last sesion, Mr. Rodríguez reports the following:     Overall he reports that symptoms are stable since his last exam    OBJECTIVE:  PATIENT REPORTED MEASURES:  Question Session Date    11/17/17       Overall, I did my speech therapy exercises / techniques / strategies: 3       0 = n/a  1 = once or twice since last session  2 = three to five days per week 3 = almost every day  4 = one to two times per day  5 = three or more times per day   Since beginning your treatment, how confident do you feel in your ability to manage your current and future symptoms? 5       1 = Not at all   2 = Very little    3 = Somewhat   4 = Reasonably  5 = Very   Considering the progress since your last appointment, please provide a response to the questions below based on the following options:   0 = N/A   1= Not at all   2 = Very little    3 = Somewhat    4 = Quite a bit     5 = A lot   To what extent has the treatment improved your symptoms? 4       To what extent has the treatment made your voice clearer? 4       To what extent has the treatment made it easier to talk? 4       To what extent has the treatment made it easier to sing? 0       To what extent are you able to implement the treatment techniques in your daily life? 5     "   To what extent are the treatment techniques habitual? 4       Since your last session how would you rate the quality of your voice on average? 9       0 - 10 scale in which 10 represents patient s best possible voice and 0 represents no voice at all   Since your last session how would you rate the effort of using your voice on average? 1       0 - 10 scale in which 10 represents maximal effort and 0 represents no effort at all     THERAPEUTIC ACTIVITIES    Exercises to promote optimal respiratory mechanics    I provided explanation of the anatomy and physiology of respiration for speech and singing; he found this to be helpful    He demonstrated non-optimal coordination of respiration and phonation during running speech    Demonstrated difficulty allowing abdominal relaxation for inhalation    Practiced in a forward leaning seated posture with tactile cue of a hand on the low rib-cage to facilitate awareness of low respiratory engagement    With clinician support, patient was able to demonstrate improved abdominal relaxation and engagement on inhalation    Semi-Occluded Vocal Tract (SOVT) exercises instructed to reduce laryngeal tension, promote vocal fold pliability, and coordinate respiration and phonation    Exercises re-instructed for accuracy and for increased focus on respiratory phonatory coordination     Straw with water resistance was found to be most facilitating     Sustained phonation, and voice vs. voiceless productions used to promote easy voicing and raise awareness of laryngeal tension    Ascending and descending glides utilized to promote vocal fold pliability    Instructed to use these exercises as a warm-up / cooldown, and to re-calibrate the voice throughout the day.    Good accuracy with minimal to moderate clinician support    Resonant Voice Therapy (RVT) exercises to promote forward locus of resonance and optimized pattern of laryngeal adduction    Easy descending glide on /m/ utilized in  "conjunction with relaxed jaw, tongue, and lightly closed lips to facilitate forward resonant sound    Use of the carrier phrase \"mmhmm\" instructed to promote generalization to everyday speech    Syllable level using /m/ in alternation with cardinal vowels on sustained pitches and speech inflection    Word level exercises featuring nasal continuant loaded stimuli    He demonstrated good accuracy with moderate and consistent clincian support      A revised regimen for home practice was instructed.    I provided an audio recording and handouts of today's therapeutic activities to facilitate practice.    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   Mr. Rodríguez feels that his voice has been maintaining at its improved quality, and is attempting to adhere to therapeutic recommendations.  Given modest awareness of patterns of use today's session focused re-establishing foundational elements of respiratory phonatory coordination, and advanced to resonant voice work to promote forward locus of resonance.    PLAN: I will see Mr. Rodríguze in 3 weeks, at which point we will continue to advance RVT.       TOTAL SERVICE TIME: 60 minutes  TREATMENT (83871): 60 minutes  NO CHARGE FACILITY FEE (39418)    Ermias Meza M.M., M.A., CCC-SLP  Speech-Language Pathologist  Certificate of Vocology  [phone]      "

## 2017-11-17 NOTE — PROGRESS NOTES
"[facility] VOICE CLINIC  THERAPY NOTE (CPT 36748)    Patient: Cleveland Rodríguez  Date of Service: 11/17/2017  Referring physician: Dr. Mitchell  Impressions from most recent evaluation:  \"Mr. 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  Reduced presence of papilloma was noted on today's examination with appropriate healing post-operatively.  Significant supraglottic hyperfunction was also visualized, and was noted to respond positively to therapeutic probes.\"    SUBJECTIVE:  Since his last sesion, Mr. Rodríguez reports the following:     Overall he reports that symptoms are stable since his last exam    OBJECTIVE:  PATIENT REPORTED MEASURES:  Question Session Date    11/17/17       Overall, I did my speech therapy exercises / techniques / strategies: 3       0 = n/a  1 = once or twice since last session  2 = three to five days per week 3 = almost every day  4 = one to two times per day  5 = three or more times per day   Since beginning your treatment, how confident do you feel in your ability to manage your current and future symptoms? 5       1 = Not at all   2 = Very little    3 = Somewhat   4 = Reasonably  5 = Very   Considering the progress since your last appointment, please provide a response to the questions below based on the following options:   0 = N/A   1= Not at all   2 = Very little    3 = Somewhat    4 = Quite a bit     5 = A lot   To what extent has the treatment improved your symptoms? 4       To what extent has the treatment made your voice clearer? 4       To what extent has the treatment made it easier to talk? 4       To what extent has the treatment made it easier to sing? 0       To what extent are you able to implement the treatment techniques in your daily life? 5       To what extent are the treatment techniques habitual? 4       Since your last " session how would you rate the quality of your voice on average? 9       0 - 10 scale in which 10 represents patient s best possible voice and 0 represents no voice at all   Since your last session how would you rate the effort of using your voice on average? 1       0 - 10 scale in which 10 represents maximal effort and 0 represents no effort at all     THERAPEUTIC ACTIVITIES    Exercises to promote optimal respiratory mechanics    I provided explanation of the anatomy and physiology of respiration for speech and singing; he found this to be helpful    He demonstrated non-optimal coordination of respiration and phonation during running speech    Demonstrated difficulty allowing abdominal relaxation for inhalation    Practiced in a forward leaning seated posture with tactile cue of a hand on the low rib-cage to facilitate awareness of low respiratory engagement    With clinician support, patient was able to demonstrate improved abdominal relaxation and engagement on inhalation    Semi-Occluded Vocal Tract (SOVT) exercises instructed to reduce laryngeal tension, promote vocal fold pliability, and coordinate respiration and phonation    Exercises re-instructed for accuracy and for increased focus on respiratory phonatory coordination     Straw with water resistance was found to be most facilitating     Sustained phonation, and voice vs. voiceless productions used to promote easy voicing and raise awareness of laryngeal tension    Ascending and descending glides utilized to promote vocal fold pliability    Instructed to use these exercises as a warm-up / cooldown, and to re-calibrate the voice throughout the day.    Good accuracy with minimal to moderate clinician support    Resonant Voice Therapy (RVT) exercises to promote forward locus of resonance and optimized pattern of laryngeal adduction    Easy descending glide on /m/ utilized in conjunction with relaxed jaw, tongue, and lightly closed lips to facilitate  "forward resonant sound    Use of the carrier phrase \"mmhmm\" instructed to promote generalization to everyday speech    Syllable level using /m/ in alternation with cardinal vowels on sustained pitches and speech inflection    Word level exercises featuring nasal continuant loaded stimuli    He demonstrated good accuracy with moderate and consistent clincian support      A revised regimen for home practice was instructed.    I provided an audio recording and handouts of today's therapeutic activities to facilitate practice.    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  Mr. Rodríguez feels that his voice has been maintaining at its improved quality, and is attempting to adhere to therapeutic recommendations.  Given modest awareness of patterns of use today's session focused re-establishing foundational elements of respiratory phonatory coordination, and advanced to resonant voice work to promote forward locus of resonance.    PLAN: I will see Mr. Rodríguez in 3 weeks, at which point we will continue to advance RVT.       TOTAL SERVICE TIME: 60 minutes  TREATMENT (55611): 60 minutes  NO CHARGE FACILITY FEE (26586)    Ermias Meza M.M., M.A., CCC-SLP  Speech-Language Pathologist  Certificate of Vocology  [phone]    "

## 2017-11-17 NOTE — MR AVS SNAPSHOT
After Visit Summary   2017    Cleveland Rodríguez    MRN: [id number]           Patient Information     Date Of Birth          1955        Visit Information        Provider Department      2017 2:00 PM Segundo Meza SLP [facility]        Today's Diagnoses     Dysphonia    -  1    Recurrent respiratory papillomatosis           Follow-ups after your visit        Who to contact     Please call your clinic at [phone] to:    Ask questions about your health    Make or cancel appointments    Discuss your medicines    Learn about your test results    Speak to your doctor   If you have compliments or concerns about an experience at your clinic, or if you wish to file a complaint, please contact [facility] Patient Relations at [phone] or email us at [email or web address]         Additional Information About Your Visit        MyChart Information     [employer] is an electronic gateway that provides easy, online access to your medical records. With [employer], you can request a clinic appointment, read your test results, renew a prescription or communicate with your care team.     To sign up for [employer] visit the website at [email or web address]   You will be asked to enter the access code listed below, as well as some personal information. Please follow the directions to create your username and password.     Your access code is: [email or web address]  Expires: 12/3/2017  5:30 AM     Your access code will  in 90 days. If you need help or a new code, please contact your [facility] or call [phone] for assistance.        Care EveryWhere ID     This is your Care EveryWhere ID. This could be used by other organizations to access your [place] medical records  [id number]         Blood Pressure from Last 3 Encounters:   10/26/17 106/63   10/03/17 102/78   16 104/66    Weight from Last 3 Encounters:   17  91.6 kg (202 lb)   10/26/17 91.6 kg (202 lb)   10/03/17 91.6 kg (202 lb)              We Performed the Following     SPEECH/HEARING THERAPY, INDIVIDUAL        Primary Care Provider Office Phone # Fax #    Yoel Amaro [phone] [address]  [facility] [address]        Equal Access to Services     [facility] : Hadii aad ku hadasho Soomaali, waaxda luqadaha, qaybta kaalmada adeegyada, waxay idiin hayaan adeeg khyolandash la'aan ah. So [facility] [phone].    ATENCIÓN: Si habla español, tiene a boles disposición servicios gratuitos de asistencia lingüística. [facility] [phone].    We comply with applicable federal civil rights laws and [place] laws. We do not discriminate on the basis of race, color, national origin, age, disability, sex, sexual orientation, or gender identity.            Thank you!     Thank you for choosing  [facility] VOICE  for your care. Our goal is always to provide you with excellent care. Hearing back from our patients is one way we can continue to improve our services. Please take a few minutes to complete the written survey that you may receive in the mail after your visit with us. Thank you!             Your Updated Medication List - Protect others around you: Learn how to safely use, store and throw away your medicines at www.disposemymeds.org.          This list is accurate as of: 11/17/17  3:14 PM.  Always use your most recent med list.                   Brand Name Dispense Instructions for use Diagnosis    aspirin 81 MG tablet      Take 1 tablet by mouth daily        * atorvastatin 20 MG tablet    LIPITOR    90 tablet    TAKE 1 TABLET BY MOUTH DAILY    Hyperlipidemia LDL goal <130       * atorvastatin 20 MG tablet    LIPITOR    90 tablet    TAKE 1 TABLET BY MOUTH DAILY    Hyperlipidemia LDL goal <130       DAILY MULTIVITAMIN PO      Take  by mouth 2 times daily.    Routine general medical examination at a health care facility, Polyp of vocal cord or larynx,  Degeneration of lumbar or lumbosacral intervertebral disc, Iritis, Hyperlipidemia LDL goal <130       HYDROcodone-acetaminophen 5-325 MG per tablet    NORCO    12 tablet    Take 1-2 tablets by mouth every 6 hours as needed for other (Moderate to Severe Pain)    Laryngeal papilloma, Dysphonia       metroNIDAZOLE 1 % gel    METROGEL    60 g    Apply 1 g topically daily apply hs for Rosacea    Rosacea       * Notice:  This list has 2 medication(s) that are the same as other medications prescribed for you. Read the directions carefully, and ask your doctor or other care provider to review them with you.

## 2018-08-30 ENCOUNTER — OFFICE VISIT (OUTPATIENT)
Dept: FAMILY MEDICINE | Facility: CLINIC | Age: 63
End: 2018-08-30
Payer: COMMERCIAL

## 2018-08-30 VITALS
HEART RATE: 63 BPM | BODY MASS INDEX: 25.2 KG/M2 | WEIGHT: 196.38 LBS | OXYGEN SATURATION: 100 % | HEIGHT: 74 IN | TEMPERATURE: 97.8 F | DIASTOLIC BLOOD PRESSURE: 76 MMHG | SYSTOLIC BLOOD PRESSURE: 113 MMHG | RESPIRATION RATE: 16 BRPM

## 2018-08-30 DIAGNOSIS — Z00.01 ENCOUNTER FOR ROUTINE ADULT MEDICAL EXAM WITH ABNORMAL FINDINGS: Primary | ICD-10-CM

## 2018-08-30 DIAGNOSIS — M79.10 MYALGIA: ICD-10-CM

## 2018-08-30 DIAGNOSIS — E78.5 HYPERLIPIDEMIA LDL GOAL <130: ICD-10-CM

## 2018-08-30 LAB
ANION GAP SERPL CALCULATED.3IONS-SCNC: 8 MMOL/L (ref 3–14)
BUN SERPL-MCNC: 26 MG/DL (ref 7–30)
CALCIUM SERPL-MCNC: 9.3 MG/DL (ref 8.5–10.1)
CHLORIDE SERPL-SCNC: 108 MMOL/L (ref 94–109)
CHOLEST SERPL-MCNC: 136 MG/DL
CK SERPL-CCNC: 362 U/L (ref 30–300)
CO2 SERPL-SCNC: 28 MMOL/L (ref 20–32)
CREAT SERPL-MCNC: 0.98 MG/DL (ref 0.66–1.25)
DEPRECATED CALCIDIOL+CALCIFEROL SERPL-MC: 36 UG/L (ref 20–75)
ERYTHROCYTE [SEDIMENTATION RATE] IN BLOOD BY WESTERGREN METHOD: 7 MM/H (ref 0–20)
GFR SERPL CREATININE-BSD FRML MDRD: 76 ML/MIN/1.7M2
GLUCOSE SERPL-MCNC: 100 MG/DL (ref 70–99)
HDLC SERPL-MCNC: 59 MG/DL
LDLC SERPL CALC-MCNC: 66 MG/DL
NONHDLC SERPL-MCNC: 77 MG/DL
POTASSIUM SERPL-SCNC: 4.5 MMOL/L (ref 3.4–5.3)
SODIUM SERPL-SCNC: 144 MMOL/L (ref 133–144)
TRIGL SERPL-MCNC: 53 MG/DL

## 2018-08-30 PROCEDURE — 80048 BASIC METABOLIC PNL TOTAL CA: CPT | Performed by: FAMILY MEDICINE

## 2018-08-30 PROCEDURE — 85652 RBC SED RATE AUTOMATED: CPT | Performed by: FAMILY MEDICINE

## 2018-08-30 PROCEDURE — 80061 LIPID PANEL: CPT | Performed by: FAMILY MEDICINE

## 2018-08-30 PROCEDURE — 82306 VITAMIN D 25 HYDROXY: CPT | Performed by: FAMILY MEDICINE

## 2018-08-30 PROCEDURE — 82550 ASSAY OF CK (CPK): CPT | Performed by: FAMILY MEDICINE

## 2018-08-30 PROCEDURE — 36415 COLL VENOUS BLD VENIPUNCTURE: CPT | Performed by: FAMILY MEDICINE

## 2018-08-30 PROCEDURE — 99396 PREV VISIT EST AGE 40-64: CPT | Performed by: FAMILY MEDICINE

## 2018-08-30 RX ORDER — ATORVASTATIN CALCIUM 20 MG/1
20 TABLET, FILM COATED ORAL DAILY
Qty: 90 TABLET | Refills: 3 | Status: SHIPPED | OUTPATIENT
Start: 2018-08-30 | End: 2019-10-01

## 2018-08-30 NOTE — MR AVS SNAPSHOT
After Visit Summary   8/30/2018    Cleveland Rodríguez    MRN: [id number]           Patient Information     Date Of Birth          1955        Visit Information        Provider Department      8/30/2018 7:40 AM Yoel Amaro MD [facility]        Today's Diagnoses     Myalgia    -  1    Encounter for routine adult medical exam with abnormal findings        Hyperlipidemia LDL goal <130          Care Instructions      Preventive Health Recommendations  Male Ages 50 - 64    Yearly exam:             See your health care provider every year in order to  o   Review health changes.   o   Discuss preventive care.    o   Review your medicines if your doctor has prescribed any.     Have a cholesterol test every 5 years, or more frequently if you are at risk for high cholesterol/heart disease.     Have a diabetes test (fasting glucose) every three years. If you are at risk for diabetes, you should have this test more often.     Have a colonoscopy at age 50, or have a yearly FIT test (stool test). These exams will check for colon cancer.      Talk with your health care provider about whether or not a prostate cancer screening test (PSA) is right for you.    You should be tested each year for STDs (sexually transmitted diseases), if you re at risk.     Shots: Get a flu shot each year. Get a tetanus shot every 10 years.     Nutrition:    Eat at least 5 servings of fruits and vegetables daily.     Eat whole-grain bread, whole-wheat pasta and brown rice instead of white grains and rice.     Get adequate Calcium and Vitamin D.     Lifestyle    Exercise for at least 150 minutes a week (30 minutes a day, 5 days a week). This will help you control your weight and prevent disease.     Limit alcohol to one drink per day.     No smoking.     Wear sunscreen to prevent skin cancer.     See your dentist every six months for an exam and cleaning.     See your eye doctor every 1 to 2 years.    Send in  a copy of your health care directive when you get a chance.             Follow-ups after your visit        Follow-up notes from your care team     Return in about 1 year (around 8/30/2019) for Physical Exam.      Your next 10 appointments already scheduled     Sep 28, 2018  8:00 AM CDT   (Arrive by 7:45 AM)   Return Visit with Betty Mitchell MD   [facility] ([facility])    [address]  [address] [facility] [address] [phone]           This is a multi-disciplinary care team visit as patients with your type of problem are usually seen by a team of an MD and a Speech-Language Pathologist (who is a specialist in disorders of the voice, throat, and breathing).  Please plan about 2 hours for your visit, which will likely include Laryngeal Function Studies, a Voice/Swallow/Breathing Evaluation, and an Endoscopic Laryngeal Examination to provide a comprehensive evaluation.  Please check with your insurance company to ensure you are covered for these services. - It is important to know that if you are evaluated and/or treated by both a physician and a speech pathologist during your visit, your billing will reflect the input that you receive from both providers as separate professionals. Although most insurance plans do cover these services, we encourage you to contact your insurance company prior to your visit to determine whether there are any coverage limitations that might affect you financially. - Billing/procedure codes that are frequently associated with visits to our clinic include (but are not limited to) the ones listed below. Most patients will not need all of these items, but it may be useful to ask your insurance company's patient . 53210: Flexible fiberoptic laryngoscopy, 89592: Laryngoscopy; flexible or rigid fiberoptic, with stroboscopy, 39479: Flexible endoscopic evaluation of swallowing, 41057: Laryngeal function  "aerodynamic evaluation, 83687: Evaluation of Voice and Resonance, 62904: Speech pathology treatment for voice, speech, communication, 45362: Speech pathology treatment for swallowing/oral function for feeding - If you have any concerns or questions, or if you would prefer not to have a speech pathologist involved in your visit, please contact our Clinic Coordinator at [phone], at least 24 hours prior to your appointment.            Sep 28, 2018  8:00 AM CDT   (Arrive by 7:45 AM)   [facility] Speech Pathology and ENT Evaluation with  Ent Dysphonia Slp Provider   [facility] ([facility])    [address]  4th [address]  [facility] [address] [phone]              Who to contact     If you have questions or need follow up information about today's clinic visit or your schedule please contact [facility] directly at [phone].  Normal or non-critical lab and imaging results will be communicated to you by MyChart, letter or phone within 4 business days after the clinic has received the results. If you do not hear from us within 7 days, please contact the clinic through MyChart or phone. If you have a critical or abnormal lab result, we will notify you by phone as soon as possible.  Submit refill requests through [employer] or call your pharmacy and they will forward the refill request to us. Please allow 3 business days for your refill to be completed.          Additional Information About Your Visit        Care EveryWhere ID     This is your Care EveryWhere ID. This could be used by other organizations to access your [place] medical records  [id number]        Your Vitals Were     Pulse Temperature Respirations Height Pulse Oximetry BMI (Body Mass Index)    63 97.8  F (36.6  C) (Oral) 16 6' 2\" (1.88 m) 100% 25.21 kg/m2       Blood Pressure from Last 3 Encounters:   08/30/18 113/76   10/26/17 106/63   10/03/17 102/78    Weight from Last 3 Encounters: "   08/30/18 196 lb 6 oz (89.1 kg)   11/13/17 202 lb (91.6 kg)   10/26/17 202 lb (91.6 kg)              We Performed the Following     Basic metabolic panel     CK total     ESR: Erythrocyte sedimentation rate     Lipid panel reflex to direct LDL Fasting     Vitamin D Deficiency          Today's Medication Changes          These changes are accurate as of 8/30/18  7:55 AM.  If you have any questions, ask your nurse or doctor.               These medicines have changed or have updated prescriptions.        Dose/Directions    atorvastatin 20 MG tablet   Commonly known as:  LIPITOR   This may have changed:  See the new instructions.   Used for:  Hyperlipidemia LDL goal <130   Changed by:  Yoel Amaro MD        Dose:  20 mg   Take 1 tablet (20 mg) by mouth daily   Quantity:  90 tablet   Refills:  3            Where to get your medicines      These medications were sent to [facility] [address] - [place], [place] - [address]  [address], [place] [address]     Phone:  [phone]     atorvastatin 20 MG tablet                Primary Care Provider Office Phone # Fax #    Yoel Amaro [phone] [address]  [facility] [address]        Equal Access to Services     [facility] AH: Hadii aad ku hadasho Soomaali, waaxda luqadaha, qaybta kaalmada adeegyada, waxay ankitain haymary janen ronn west . So [facility] [phone].    ATENCIÓN: Si habla español, tiene a boles disposición servicios gratuitos de asistencia lingüística. Llame al [phone].    We comply with applicable federal civil rights laws and [place] laws. We do not discriminate on the basis of race, color, national origin, age, disability, sex, sexual orientation, or gender identity.            Thank you!     Thank you for choosing [facility]  for your care. Our goal is always to provide you with excellent care. Hearing back from our patients is one way we can continue to improve our services. Please take a few minutes  to complete the written survey that you may receive in the mail after your visit with us. Thank you!             Your Updated Medication List - Protect others around you: Learn how to safely use, store and throw away your medicines at www.disposemymeds.org.          This list is accurate as of 8/30/18  7:55 AM.  Always use your most recent med list.                   Brand Name Dispense Instructions for use Diagnosis    aspirin 81 MG tablet      Take 1 tablet by mouth daily        atorvastatin 20 MG tablet    LIPITOR    90 tablet    Take 1 tablet (20 mg) by mouth daily    Hyperlipidemia LDL goal <130       DAILY MULTIVITAMIN PO      Take  by mouth 2 times daily.    Routine general medical examination at a health care facility, Polyp of vocal cord or larynx, Degeneration of lumbar or lumbosacral intervertebral disc, Iritis, Hyperlipidemia LDL goal <130       metroNIDAZOLE 1 % gel    METROGEL    60 g    Apply 1 g topically daily apply hs for Rosacea    Rosacea

## 2018-08-30 NOTE — LETTER
August 31, 2018      Cleveland Rodríguez  [address] [place] [address]        Dear ,    We are writing to inform you of your test results.    Cleveland, Electrolytes and kidney tests are normal. The blood sugar we should recheck in a year. Vitamin D level is normal. The cholesterol is great. The CK muscle enzyme test is elevated. Lets have your stop the lipiitor for 1-2 months and have you follow up for a recheck. We will recheck the lipids cholesterol at this follow up visit. I will have my nurse try to call you with the results to make sure you get the message.    Resulted Orders   Lipid panel reflex to direct LDL Fasting   Result Value Ref Range    Cholesterol 136 <200 mg/dL    Triglycerides 53 <150 mg/dL      Comment:      Fasting specimen    HDL Cholesterol 59 >39 mg/dL    LDL Cholesterol Calculated 66 <100 mg/dL      Comment:      Desirable:       <100 mg/dl    Non HDL Cholesterol 77 <130 mg/dL   CK total   Result Value Ref Range    CK Total 362 (H) 30 - 300 U/L   Basic metabolic panel   Result Value Ref Range    Sodium 144 133 - 144 mmol/L    Potassium 4.5 3.4 - 5.3 mmol/L    Chloride 108 94 - 109 mmol/L    Carbon Dioxide 28 20 - 32 mmol/L    Anion Gap 8 3 - 14 mmol/L    Glucose 100 (H) 70 - 99 mg/dL      Comment:      Fasting specimen    Urea Nitrogen 26 7 - 30 mg/dL    Creatinine 0.98 0.66 - 1.25 mg/dL    GFR Estimate 76 >60 mL/min/1.7m2      Comment:      Non  GFR Calc    GFR Estimate If Black >90 >60 mL/min/1.7m2      Comment:       GFR Calc    Calcium 9.3 8.5 - 10.1 mg/dL   Vitamin D Deficiency   Result Value Ref Range    Vitamin D Deficiency screening 36 20 - 75 ug/L      Comment:      Season, race, dietary intake, and treatment affect the concentration of   25-hydroxy-Vitamin D. Values may decrease during winter months and increase   during summer months. Values 20-29 ug/L may indicate Vitamin D insufficiency   and values <20 ug/L may indicate Vitamin D  deficiency.  Vitamin D determination is routinely performed by an immunoassay specific for   25 hydroxyvitamin D3.  If an individual is on vitamin D2 (ergocalciferol)   supplementation, please specify 25 OH vitamin D2 and D3 level determination by   LCMSMS test VITD23.     ESR: Erythrocyte sedimentation rate   Result Value Ref Range    Sed Rate 7 0 - 20 mm/h       If you have any questions or concerns, please call the clinic at the number listed above.       Sincerely,        Yoel Amaro MD/nr

## 2018-08-30 NOTE — PATIENT INSTRUCTIONS
Preventive Health Recommendations  Male Ages 50 - 64    Yearly exam:             See your health care provider every year in order to  o   Review health changes.   o   Discuss preventive care.    o   Review your medicines if your doctor has prescribed any.     Have a cholesterol test every 5 years, or more frequently if you are at risk for high cholesterol/heart disease.     Have a diabetes test (fasting glucose) every three years. If you are at risk for diabetes, you should have this test more often.     Have a colonoscopy at age 50, or have a yearly FIT test (stool test). These exams will check for colon cancer.      Talk with your health care provider about whether or not a prostate cancer screening test (PSA) is right for you.    You should be tested each year for STDs (sexually transmitted diseases), if you re at risk.     Shots: Get a flu shot each year. Get a tetanus shot every 10 years.     Nutrition:    Eat at least 5 servings of fruits and vegetables daily.     Eat whole-grain bread, whole-wheat pasta and brown rice instead of white grains and rice.     Get adequate Calcium and Vitamin D.     Lifestyle    Exercise for at least 150 minutes a week (30 minutes a day, 5 days a week). This will help you control your weight and prevent disease.     Limit alcohol to one drink per day.     No smoking.     Wear sunscreen to prevent skin cancer.     See your dentist every six months for an exam and cleaning.     See your eye doctor every 1 to 2 years.    Send in a copy of your health care directive when you get a chance.

## 2018-08-30 NOTE — PROGRESS NOTES
SUBJECTIVE:   CC: Cleveland Rodríguez is an 62 year old male who presents for preventative health visit.     Physical   Annual:     Getting at least 3 servings of Calcium per day:  Yes    Bi-annual eye exam:  Yes    Dental care twice a year:  Yes    Sleep apnea or symptoms of sleep apnea:  None    Diet:  Regular (no restrictions)    Taking medications regularly:  Yes    Medication side effects:  None    Additional concerns today:  No      Today's PHQ-2 Score:   PHQ-2 ( 1999 [employer]) 8/30/2018   Q1: Little interest or pleasure in doing things 0   Q2: Feeling down, depressed or hopeless 0   PHQ-2 Score 0   Q1: Little interest or pleasure in doing things Not at all   Q2: Feeling down, depressed or hopeless Not at all   PHQ-2 Score 0       Abuse: Current or Past(Physical, Sexual or Emotional)- No  Do you feel safe in your environment - Yes    Social History   Substance Use Topics     Smoking status: Never Smoker     Smokeless tobacco: Never Used     Alcohol use Yes      Comment: social     Alcohol Use 8/30/2018   If you drink alcohol do you typically have greater than 3 drinks per day OR greater than 7 drinks per week? No   No flowsheet data found.    Last PSA:   PSA   Date Value Ref Range Status   10/14/2009 1.11 0 - 4 ug/L Final       Reviewed orders with patient. Reviewed health maintenance and updated orders accordingly - Yes  Labs reviewed in EPIC    Reviewed and updated as needed this visit by clinical staff         Reviewed and updated as needed this visit by Provider            Review of Systems  CONSTITUTIONAL: NEGATIVE for fever, chills, change in weight  INTEGUMENTARY/SKIN: NEGATIVE for worrisome rashes, moles or lesions  EYES: NEGATIVE for vision changes or irritation  ENT: NEGATIVE for ear problems  RESP: NEGATIVE for significant cough or SOB  CV: NEGATIVE for chest pain, palpitations or peripheral edema  GI: NEGATIVE for nausea, abdominal pain, heartburn, or change in bowel habits   male: negative for  "dysuria, hematuria, decreased urinary stream, erectile dysfunction, urethral discharge  MUSCULOSKELETAL: NEGATIVE for significant arthralgias or myalgia  NEURO: NEGATIVE for weakness, dizziness or paresthesias  PSYCHIATRIC: NEGATIVE for changes in mood or affect    OBJECTIVE:   /76  Pulse 63  Temp 97.8  F (36.6  C) (Oral)  Resp 16  Ht 6' 2\" (1.88 m)  Wt 196 lb 6 oz (89.1 kg)  SpO2 100%  BMI 25.21 kg/m2    Physical Exam  GENERAL: healthy, alert and no distress  EYES: Eyes grossly normal to inspection, PERRL and conjunctivae and sclerae normal  HENT: ear canals and TM's normal, nose and mouth without ulcers or lesions  NECK: no adenopathy, no asymmetry, masses, or scars and thyroid normal to palpation  RESP: lungs clear to auscultation - no rales, rhonchi or wheezes  CV: regular rate and rhythm, normal S1 S2, no S3 or S4, no murmur, click or rub, no peripheral edema and peripheral pulses strong  ABDOMEN: soft, nontender, no hepatosplenomegaly, no masses and bowel sounds normal  MS: no gross musculoskeletal defects noted, no edema. Myalgias just in his legs at night. Somewhat feel like need to move around.   SKIN: no suspicious lesions or rashes  NEURO: Normal strength and tone, mentation intact and speech normal  PSYCH: mentation appears normal, affect normal/bright    Diagnostic Test Results:  none     ASSESSMENT/PLAN:       ICD-10-CM    1. Encounter for routine adult medical exam with abnormal findings Z00.01 Basic metabolic panel   2. Myalgia M79.1 CK total     Vitamin D Deficiency     ESR: Erythrocyte sedimentation rate   3. Hyperlipidemia LDL goal <130 E78.5 Lipid panel reflex to direct LDL Fasting     atorvastatin (LIPITOR) 20 MG tablet   will check labs for other cause of myalgias. If normal, then would recommend trial off statin for 1-2 months.     COUNSELING:   Reviewed preventive health counseling, as reflected in patient instructions       Regular exercise       Healthy diet/nutrition       " "Colon cancer screening       Prostate cancer screening    BP Readings from Last 1 Encounters:   10/26/17 106/63     Estimated body mass index is 25.21 kg/(m^2) as calculated from the following:    Height as of this encounter: 6' 2\" (1.88 m).    Weight as of this encounter: 196 lb 6 oz (89.1 kg).       reports that he has never smoked. He has never used smokeless tobacco.      Counseling Resources:  ATP IV Guidelines  Pooled Cohorts Equation Calculator  FRAX Risk Assessment  ICSI Preventive Guidelines  Dietary Guidelines for Americans, 2010  USDA's MyPlate  ASA Prophylaxis  Lung CA Screening    Yoel Amaro MD  [facility]  "

## 2018-09-10 ENCOUNTER — TELEPHONE (OUTPATIENT)
Dept: FAMILY MEDICINE | Facility: CLINIC | Age: 63
End: 2018-09-10

## 2018-09-10 DIAGNOSIS — R74.8 ELEVATED CK: Primary | ICD-10-CM

## 2018-09-10 NOTE — TELEPHONE ENCOUNTER
"Dr. Amaro are there \"other\" labs you wanted to do for his myalgias or did you think it was related to his statin lipitor? You are having him stop his lipitor for 1-2 months . I will place order for a repeat CK.  Anything else you wanted to check?   Veronika Aguilar RN    "

## 2018-09-10 NOTE — TELEPHONE ENCOUNTER
Lipid panel placed per verbal from Dr. Amaro below.    Gayatri Nolen, BSN, RN  [facility]

## 2018-09-10 NOTE — TELEPHONE ENCOUNTER
"The patient saw PCP 8/30 and future labs were recommended, \"check labs for other cause of myalgias\".  Patient is scheduled for lab appointment 10/5/18.  "

## 2018-09-28 ENCOUNTER — OFFICE VISIT (OUTPATIENT)
Dept: OTOLARYNGOLOGY | Facility: CLINIC | Age: 63
End: 2018-09-28

## 2018-09-28 ENCOUNTER — APPOINTMENT (OUTPATIENT)
Dept: OTOLARYNGOLOGY | Facility: CLINIC | Age: 63
End: 2018-09-28

## 2018-09-28 DIAGNOSIS — R49.0 DYSPHONIA: Primary | ICD-10-CM

## 2018-09-28 DIAGNOSIS — D14.1 LARYNGEAL PAPILLOMA: ICD-10-CM

## 2018-09-28 NOTE — PROGRESS NOTES
Dear Colleague:  Cleveland Rodríguez recently returned for follow-up at the [facility]. My clinic note from our visit is enclosed below.  Speech recognition software may have been used in the documentation below; input is reviewed before signature to the best of my ability.     I appreciate the ongoing opportunity to participate in this patient's care.    Please feel free to contact me with any questions.      Sincerely yours,  Betty Mitchell M.D., M.P.H.  , Laryngology  Director, [facility]  Otolaryngology- Head & Neck Surgery  [phone]            =====  HISTORY OF PRESENT ILLNESS:  Cleveland Rodríguez is a pleasant 62 year old male with recurrent respiratory papillomatosis (RRP) (HPV6) who returns in follow up today. He reports that his symptoms are significantly worse than when he was last in. His voice quality is inconsistent but at times is very disruptive.    MEDICATIONS:     Current Outpatient Prescriptions   Medication Sig Dispense Refill     aspirin 81 MG tablet Take 1 tablet by mouth daily        atorvastatin (LIPITOR) 20 MG tablet Take 1 tablet (20 mg) by mouth daily 90 tablet 3     metroNIDAZOLE (METROGEL) 1 % gel Apply 1 g topically daily apply hs for Rosacea 60 g 11     Multiple Vitamin (DAILY MULTIVITAMIN PO) Take  by mouth 2 times daily.         ALLERGIES:    Allergies   Allergen Reactions     Amoxicillin      Rash         NEW PMH/PSH: None    REVIEW OF SYSTEMS:  The patient completed a comprehensive 11 point review of systems (below), which was reviewed. Positives are as noted below.  Patient Supplied Answers to Review of Systems   ENT ROS 9/28/2018   Ears, Nose, Throat Hoarseness   Cardiopulmonary -   Musculoskeletal Sore or stiff joints   Endocrine Frequent urination       PHYSICAL EXAM:  General: The patient was alert and conversant, and in no acute distress.    Neck: No palpable cervical lymphadenopathy, no significant  tenderness to palpation of the thyrohyoid space, which was narrow. No obvious thyroid abnormality.  Resp: Breathing comfortably, no stridor or stertor.  Neuro: Symmetric facial function. Other cranial nerve function as documented above.  Psych: Normal affect, pleasant and cooperative.  Voice/speech: Mild to moderate dysphonia characterized by breathiness, roughness and strain.      Intake scores  Last 2 Scores for Patient-Answered VHI Questionnaire  VHI Total Score 11/13/2017 9/28/2018   VHI Total Score 10 Incomplete      Last 2 Scores for Patient-Answered EAT Questionnaire  EAT Total Score 11/13/2017   EAT Total Score 1      Last 2 Scores for Patient-Answered CSI Questionnaire  CSI Total Score 11/13/2017   CSI Total Score 1       Procedure:   Flexible fiberoptic laryngoscopy and laryngovideostroboscopy  Indications: This procedure was warranted to evaluate the patient's laryngeal anatomy and function. Risks, benefits, and alternatives were discussed.  Description: After written informed consent was obtained, a time-out was performed to confirm patient identity, procedure, and procedure site. Topical 3% lidocaine with 0.25% phenylephrine was applied to the nasal cavities. I performed the endoscopy and no complications were apparent. Continuous and stroboscopic light were utilized to assess routine phonation and variable frequency phonation.  Performed by: Betty Mitchell MD MPH  SLP: NA  Findings: Normal nasopharynx. Normal base of tongue, valleculae, and epiglottis. Vocal fold mobility: right: normal; left: normal. Medial edges of the vocal folds: right with superficial spreading papilloma anteriorly, posterior half with small cluster; left with prominent mid vocal fold cluster of papilloma on a bed of mild superficial spreading papilloma.  Small to moderate anterior web, stable since seen at initial appt 9/17. Glissade produced appropriate elongation. There was mild to moderate supraglottic recruitment with  connected speech. Mucosa of false vocal folds, aryepiglottic folds, piriform sinuses, and posterior glottis unremarkable. Stable mild posterior glottic web. Airway was patent.     The addition of stroboscopy allowed evaluation of the mucosal wave.   Amplitude: right: moderately decreased; left: moderately decreased. Symmetry: intermittent symmetry. Closure pattern: hourglass-shaped and irregular. Closure plane: at glottic level. Phase distribution: normal.           IMPRESSION AND PLAN:   Cleveland Rodríguez returns with recurrence of his RRP; the left mid vocal fold cluster is likely most affecting his voice. We discussed options for management including return to the OR and in-clinic KTP.  We discussed that a more thorough cleanout is possible in the operating room, but the in office KTP is a good option for focal treatment of the area that is most likely having a negative impact on his voice.  He understands that multiple procedures may be required, but even reducing the overall size of the left mid vocal fold cluster would likely make his voice use less fatiguing.  He is interested in the in office option, but would like to know more about the potential financial ramifications.  I will ask my financial team to contact him to help figure this out.  He will let me know what he would like to do after he has more information. I appreciate the opportunity to participate in the care of this pleasant patient.

## 2018-09-28 NOTE — NURSING NOTE
Chief Complaint   Patient presents with     RECHECK     laryngeal papilloma     There were no vitals taken for this visit.    Dandre Chavez

## 2018-09-28 NOTE — MR AVS SNAPSHOT
After Visit Summary   9/28/2018    Cleveland Rodríguez    MRN: [id number]           Patient Information     Date Of Birth          1955        Visit Information        Provider Department      9/28/2018 8:00 AM Betty Mitchell MD [facility] Ear Nose and Throat        Today's Diagnoses     Dysphonia    -  1    Laryngeal papilloma          Care Instructions    1.  You were seen in the ENT Clinic today by Dr. Mitchell.  If you have any questions or concerns after your appointment, please call [phone].  Press option #1 for scheduling related needs.  Press option #3 for Nurse advice.  2.  Plan is to return to clinic for KTP laser procedure.  Financial counseling will reach out to discuss cost.      Elizabeth MCMILLAN, RN  [facility] ENT   Head & Neck Surgery               Follow-ups after your visit        Additional Services     OTOLARYNGOLOGY REFERRAL       SPEECH-LANGUAGE PATHOLOGY SERVICE(S) REQUESTED:  Evaluate and treat    Magy Guthrie, Ph.D., CCC/SLP  Speech-Language Pathologist  Director, [facility]  [phone]    Nelli Mccloud M.M., M.A., CCC/SLP  Speech-Language Pathologist  [facility]  [phone]                  Your next 10 appointments already scheduled     Oct 05, 2018  7:45 AM CDT   LAB with [facility]   [facility] ([address] [address] [phone]           Please do not eat 10-12 hours before your appointment if you are coming in fasting for labs on lipids, cholesterol, or glucose (sugar). This does not apply to pregnant women. Water, hot tea and black coffee (with nothing added) are okay. Do not drink other fluids, diet soda or chew gum.              Who to contact     Please call your clinic at [phone] to:    Ask questions about your health    Make or cancel appointments    Discuss your medicines    Learn about your test results    Speak to your doctor             Additional Information About Your Visit        Care EveryWhere ID     This is your Care EveryWhere ID. This could be used by other organizations to access your [place] medical records  [id number]         Blood Pressure from Last 3 Encounters:   08/30/18 113/76   10/26/17 106/63   10/03/17 102/78    Weight from Last 3 Encounters:   08/30/18 89.1 kg (196 lb 6 oz)   11/13/17 91.6 kg (202 lb)   10/26/17 91.6 kg (202 lb)              We Performed the Following     IMAGESTREAM RECORDING ORDER     LARYNGOSCOPY FLEX/RIGID W STROBOSCOPY     OTOLARYNGOLOGY REFERRAL        Primary Care Provider Office Phone # Fax #    Yoel Amaro [phone] [address] [facility] [address]        Equal Access to Services     [facility]: Hadii aad ku hadasho Soomaali, waaxda luqadaha, qaybta kaalmada adeegyada, waxay kwasi haymary janen ronn west . So [facility] [phone].    ATENCIÓN: Si habla español, tiene a boles disposición servicios gratuitos de asistencia lingüística. Martin al [phone].    We comply with applicable federal civil rights laws and [place] laws. We do not discriminate on the basis of race, color, national origin, age, disability, sex, sexual orientation, or gender identity.            Thank you!     Thank you for choosing [facility] EAR NOSE AND THROAT  for your care. Our goal is always to provide you with excellent care. Hearing back from our patients is one way we can continue to improve our services. Please take a few minutes to complete the written survey that you may receive in the mail after your visit with us. Thank you!             Your Updated Medication List - Protect others around you: Learn how to safely use, store and throw away your medicines at www.disposemymeds.org.          This list is accurate as of 9/28/18  1:29 PM.  Always use your most recent med list.                   Brand Name Dispense Instructions for use Diagnosis    aspirin 81 MG tablet      Take 1  tablet by mouth daily        atorvastatin 20 MG tablet    LIPITOR    90 tablet    Take 1 tablet (20 mg) by mouth daily    Hyperlipidemia LDL goal <130       DAILY MULTIVITAMIN PO      Take  by mouth 2 times daily.    Routine general medical examination at a health care facility, Polyp of vocal cord or larynx, Degeneration of lumbar or lumbosacral intervertebral disc, Iritis, Hyperlipidemia LDL goal <130       metroNIDAZOLE 1 % gel    METROGEL    60 g    Apply 1 g topically daily apply hs for Rosacea    Rosacea

## 2018-09-28 NOTE — PATIENT INSTRUCTIONS
1.  You were seen in the ENT Clinic today by Dr. Mitchell.  If you have any questions or concerns after your appointment, please call [phone].  Press option #1 for scheduling related needs.  Press option #3 for Nurse advice.  2.  Plan is to return to clinic for KTP laser procedure.  Financial counseling will reach out to discuss cost.      Elizabeth MILLERN, RN  [facility] ENT   Head & Neck Surgery

## 2018-09-28 NOTE — LETTER
9/28/2018      RE: Cleveland Rodríguez  [address] [place] [address]       Dear Colleague:  Cleveland Rodríguez recently returned for follow-up at the [facility]. My clinic note from our visit is enclosed below.  Speech recognition software may have been used in the documentation below; input is reviewed before signature to the best of my ability.     I appreciate the ongoing opportunity to participate in this patient's care.    Please feel free to contact me with any questions.      Sincerely yours,  Betty Mitchell M.D., M.P.H.  , Laryngology  Director, [facility]  Otolaryngology- Head & Neck Surgery  [phone]            =====  HISTORY OF PRESENT ILLNESS:  Cleveland Rodríguez is a pleasant 62 year old male with recurrent respiratory papillomatosis (RRP) (HPV6) who returns in follow up today. He reports that his symptoms are significantly worse than when he was last in. His voice quality is inconsistent but at times is very disruptive.    MEDICATIONS:     Current Outpatient Prescriptions   Medication Sig Dispense Refill     aspirin 81 MG tablet Take 1 tablet by mouth daily        atorvastatin (LIPITOR) 20 MG tablet Take 1 tablet (20 mg) by mouth daily 90 tablet 3     metroNIDAZOLE (METROGEL) 1 % gel Apply 1 g topically daily apply hs for Rosacea 60 g 11     Multiple Vitamin (DAILY MULTIVITAMIN PO) Take  by mouth 2 times daily.         ALLERGIES:    Allergies   Allergen Reactions     Amoxicillin      Rash         NEW PMH/PSH: None    REVIEW OF SYSTEMS:  The patient completed a comprehensive 11 point review of systems (below), which was reviewed. Positives are as noted below.  Patient Supplied Answers to Review of Systems   ENT ROS 9/28/2018   Ears, Nose, Throat Hoarseness   Cardiopulmonary -   Musculoskeletal Sore or stiff joints   Endocrine Frequent urination       PHYSICAL EXAM:  General: The patient was alert and conversant, and in no  acute distress.    Neck: No palpable cervical lymphadenopathy, no significant tenderness to palpation of the thyrohyoid space, which was narrow. No obvious thyroid abnormality.  Resp: Breathing comfortably, no stridor or stertor.  Neuro: Symmetric facial function. Other cranial nerve function as documented above.  Psych: Normal affect, pleasant and cooperative.  Voice/speech: Mild to moderate dysphonia characterized by breathiness, roughness and strain.      Intake scores  Last 2 Scores for Patient-Answered VHI Questionnaire  VHI Total Score 11/13/2017 9/28/2018   VHI Total Score 10 Incomplete      Last 2 Scores for Patient-Answered EAT Questionnaire  EAT Total Score 11/13/2017   EAT Total Score 1      Last 2 Scores for Patient-Answered CSI Questionnaire  CSI Total Score 11/13/2017   CSI Total Score 1       Procedure:   Flexible fiberoptic laryngoscopy and laryngovideostroboscopy  Indications: This procedure was warranted to evaluate the patient's laryngeal anatomy and function. Risks, benefits, and alternatives were discussed.  Description: After written informed consent was obtained, a time-out was performed to confirm patient identity, procedure, and procedure site. Topical 3% lidocaine with 0.25% phenylephrine was applied to the nasal cavities. I performed the endoscopy and no complications were apparent. Continuous and stroboscopic light were utilized to assess routine phonation and variable frequency phonation.  Performed by: Betty Mitchell MD MPH  SLP: NA  Findings: Normal nasopharynx. Normal base of tongue, valleculae, and epiglottis. Vocal fold mobility: right: normal; left: normal. Medial edges of the vocal folds: right with superficial spreading papilloma anteriorly, posterior half with small cluster; left with prominent mid vocal fold cluster of papilloma on a bed of mild superficial spreading papilloma.  Small to moderate anterior web, stable since seen at initial appt 9/17. Glissade produced  appropriate elongation. There was mild to moderate supraglottic recruitment with connected speech. Mucosa of false vocal folds, aryepiglottic folds, piriform sinuses, and posterior glottis unremarkable. Stable mild posterior glottic web. Airway was patent.     The addition of stroboscopy allowed evaluation of the mucosal wave.   Amplitude: right: moderately decreased; left: moderately decreased. Symmetry: intermittent symmetry. Closure pattern: hourglass-shaped and irregular. Closure plane: at glottic level. Phase distribution: normal.           IMPRESSION AND PLAN:   Cleveland Rodríguez returns with recurrence of his RRP; the left mid vocal fold cluster is likely most affecting his voice. We discussed options for management including return to the OR and in-clinic KTP.  We discussed that a more thorough cleanout is possible in the operating room, but the in office KTP is a good option for focal treatment of the area that is most likely having a negative impact on his voice.  He understands that multiple procedures may be required, but even reducing the overall size of the left mid vocal fold cluster would likely make his voice use less fatiguing.  He is interested in the in office option, but would like to know more about the potential financial ramifications.  I will ask my financial team to contact him to help figure this out.  He will let me know what he would like to do after he has more information. I appreciate the opportunity to participate in the care of this pleasant patient.         Betty Mitchell MD

## 2018-10-01 ENCOUNTER — TELEPHONE (OUTPATIENT)
Dept: OTOLARYNGOLOGY | Facility: CLINIC | Age: 63
End: 2018-10-01

## 2018-10-19 ENCOUNTER — OFFICE VISIT (OUTPATIENT)
Dept: OTOLARYNGOLOGY | Facility: CLINIC | Age: 63
End: 2018-10-19

## 2018-10-19 DIAGNOSIS — D14.1 LARYNGEAL PAPILLOMA: Primary | ICD-10-CM

## 2018-10-19 DIAGNOSIS — R49.0 DYSPHONIA: ICD-10-CM

## 2018-10-19 NOTE — MR AVS SNAPSHOT
After Visit Summary   10/19/2018    Cleveland Rodríguez    MRN: [id number]           Patient Information     Date Of Birth          1955        Visit Information        Provider Department      10/19/2018 10:00 AM Betty Mitchell MD;  ENT PROCEDURE ROOM [facility] Ear Nose and Throat        Today's Diagnoses     Laryngeal papilloma    -  1    Dysphonia           Follow-ups after your visit        Your next 10 appointments already scheduled     Oct 23, 2018  7:45 AM CDT   LAB with [facility]   [facility] ([facility])    [address] [place] [id number] [phone]           Please do not eat 10-12 hours before your appointment if you are coming in fasting for labs on lipids, cholesterol, or glucose (sugar). This does not apply to pregnant women. Water, hot tea and black coffee (with nothing added) are okay. Do not drink other fluids, diet soda or chew gum.              Who to contact     Please call your clinic at [phone] to:    Ask questions about your health    Make or cancel appointments    Discuss your medicines    Learn about your test results    Speak to your doctor            Additional Information About Your Visit        Care EveryWhere ID     This is your Care EveryWhere ID. This could be used by other organizations to access your [place] medical records  [id number]         Blood Pressure from Last 3 Encounters:   08/30/18 113/76   10/26/17 106/63   10/03/17 102/78    Weight from Last 3 Encounters:   08/30/18 89.1 kg (196 lb 6 oz)   11/13/17 91.6 kg (202 lb)   10/26/17 91.6 kg (202 lb)              We Performed the Following     IMAGESTREAM RECORDING ORDER     LARYNGOSCOPY,FLEX SCOPE,REMV LESN        Primary Care Provider Office Phone # Fax #    Yoel Amaro [phone] [address] [place] [address]        Equal Access to Services     CHARLOTTE BRUNER : christie Marcos  sumit giordanosobeida jaylaismael tate ah. [facility] [phone].    ATENCIÓN: Si marcello romero, tiene a boles disposición servicios gratuitos de asistencia lingüística. Martin al [phone].    We comply with applicable federal civil rights laws and [place] laws. We do not discriminate on the basis of race, color, national origin, age, disability, sex, sexual orientation, or gender identity.            Thank you!     Thank you for choosing [facility] EAR NOSE AND THROAT  for your care. Our goal is always to provide you with excellent care. Hearing back from our patients is one way we can continue to improve our services. Please take a few minutes to complete the written survey that you may receive in the mail after your visit with us. Thank you!             Your Updated Medication List - Protect others around you: Learn how to safely use, store and throw away your medicines at www.disposemymeds.org.          This list is accurate as of 10/19/18 11:01 AM.  Always use your most recent med list.                   Brand Name Dispense Instructions for use Diagnosis    aspirin 81 MG tablet      Take 1 tablet by mouth daily        atorvastatin 20 MG tablet    LIPITOR    90 tablet    Take 1 tablet (20 mg) by mouth daily    Hyperlipidemia LDL goal <130       DAILY MULTIVITAMIN PO      Take  by mouth 2 times daily.    Routine general medical examination at a health care facility, Polyp of vocal cord or larynx, Degeneration of lumbar or lumbosacral intervertebral disc, Iritis, Hyperlipidemia LDL goal <130       metroNIDAZOLE 1 % gel    METROGEL    60 g    Apply 1 g topically daily apply hs for Rosacea    Rosacea

## 2018-10-19 NOTE — LETTER
10/19/2018       RE: Cleveland Rodríguez  [address] [place] [address]     Dear Colleague,    Thank you for referring your patient, Cleveland Rodríguez, to the [facility] EAR NOSE AND THROAT at [facility]. Please see a copy of my visit note below.    PROCEDURE(S):  Transnasal flexible laryngoscopy  KTP laser treatment of recurrent respiratory papillomatosis (RRP) under flexible laryngoscopic visualization      PRE-OPERATIVE DIAGNOSIS:   Recurrent respiratory papillomatosis (RRP)  Dysphonia    POST-OPERATIVE DIAGNOSIS:   Recurrent respiratory papillomatosis (RRP)  Dysphonia    SURGEON: Betty Mitchell MD MPH    ASSISTANT(S): DESMOND Dawn    ANAESTHESIA: Topical local anesthesia    INDICATIONS FOR PROCEDURE:   The patient is a 63 year old male with recurrent respiratory papillomatosis (RRP). Risks, benefits, and alternatives of the procedure were discussed, including the risk of epistaxis, temporary/permanent hoarseness, scarring, injury to surrounding structures, and need for additional procedures. The patient signed written informed consent.    DESCRIPTION OF PROCEDURE:   After written informed consent was obtained, a time-out was performed to confirm patient identity, procedure, and procedure site. Topical aerosolized 3% lidocaine with 0.25% phenylephrine was applied to both nasal cavities. Flexible fiberoptic laryngoscopy was performed with good visualization of the larynx. 17 cc of 2% lidocaine was then topically applied to the vocal folds through the channel of the endoscope sleeve. The patient was asked to gargle and cough. The endoscope was removed. When adequate topical anesthesia had been obtained, the 0.4 mm laser fiber was placed in the channel and the endoscope was then replaced. The fiber was advanced so 2-3 mm was visible beyond the tip of the endoscope. The patient was asked to breathe quietly and the laser was used to photocoagulate the lesion. The  procedure was concluded when adequate photocoagulation had been achieved. The patient tolerated the procedure well.    Laser settings:   nm laser, 26 W, spot size 400um, 15 ms pulses, 2 pulses/second. 283 total Joules.  All in the room, including staff and patient, wore appropriate eye protection during the procedure.    FINDINGS:   Normal nasopharynx. Normal base of tongue, valleculae, and epiglottis. The right true vocal fold demonstrated normal mobility. The left true vocal fold demonstrated normal mobility. Right false vocal fold papilloma; left superior true vocal fold/ventricular papilloma. False vocal folds, aryepiglottic folds, piriform sinuses, and posterior glottis were unremarkable. Airway was patent.  Successful blanching and epithelial lifting of left true vocal fold lesion.     SPECIMEN(S):   None    DRAINS:   None    ESTIMATED BLOOD LOSS:   Minimal    COMPLICATIONS:   None    DISPOSITION: Stable to home    ATTENDING PRESENCE STATEMENT:  I performed this procedure.    PLAN: Voice rest for 3 days, followed by gradual resumption of gentle voice use. Follow up in ~4 weeks.         Again, thank you for allowing me to participate in the care of your patient.      Sincerely,    Betty Mitchell MD

## 2018-10-19 NOTE — PROGRESS NOTES
PROCEDURE(S):  Transnasal flexible laryngoscopy  KTP laser treatment of recurrent respiratory papillomatosis (RRP) under flexible laryngoscopic visualization      PRE-OPERATIVE DIAGNOSIS:   Recurrent respiratory papillomatosis (RRP)  Dysphonia    POST-OPERATIVE DIAGNOSIS:   Recurrent respiratory papillomatosis (RRP)  Dysphonia    SURGEON: Betty Mitchell MD MPH    ASSISTANT(S): DESMOND Dawn    ANAESTHESIA: Topical local anesthesia    INDICATIONS FOR PROCEDURE:   The patient is a 63 year old male with recurrent respiratory papillomatosis (RRP). Risks, benefits, and alternatives of the procedure were discussed, including the risk of epistaxis, temporary/permanent hoarseness, scarring, injury to surrounding structures, and need for additional procedures. The patient signed written informed consent.    DESCRIPTION OF PROCEDURE:   After written informed consent was obtained, a time-out was performed to confirm patient identity, procedure, and procedure site. Topical aerosolized 3% lidocaine with 0.25% phenylephrine was applied to both nasal cavities. Flexible fiberoptic laryngoscopy was performed with good visualization of the larynx. 17 cc of 2% lidocaine was then topically applied to the vocal folds through the channel of the endoscope sleeve. The patient was asked to gargle and cough. The endoscope was removed. When adequate topical anesthesia had been obtained, the 0.4 mm laser fiber was placed in the channel and the endoscope was then replaced. The fiber was advanced so 2-3 mm was visible beyond the tip of the endoscope. The patient was asked to breathe quietly and the laser was used to photocoagulate the lesion. The procedure was concluded when adequate photocoagulation had been achieved. The patient tolerated the procedure well.    Laser settings:   nm laser, 26 W, spot size 400um, 15 ms pulses, 2 pulses/second. 283 total Joules.  All in the room, including staff and patient, wore appropriate eye  protection during the procedure.    FINDINGS:   Normal nasopharynx. Normal base of tongue, valleculae, and epiglottis. The right true vocal fold demonstrated normal mobility. The left true vocal fold demonstrated normal mobility. Right false vocal fold papilloma; left superior true vocal fold/ventricular papilloma. False vocal folds, aryepiglottic folds, piriform sinuses, and posterior glottis were unremarkable. Airway was patent.  Successful blanching and epithelial lifting of left true vocal fold lesion.     SPECIMEN(S):   None    DRAINS:   None    ESTIMATED BLOOD LOSS:   Minimal    COMPLICATIONS:   None    DISPOSITION: Stable to home    ATTENDING PRESENCE STATEMENT:  I performed this procedure.    PLAN: Voice rest for 3 days, followed by gradual resumption of gentle voice use. Follow up in ~4 weeks.

## 2018-10-23 DIAGNOSIS — R74.8 ELEVATED CK: ICD-10-CM

## 2018-10-23 LAB
CHOLEST SERPL-MCNC: 187 MG/DL
CK SERPL-CCNC: 104 U/L (ref 30–300)
HDLC SERPL-MCNC: 62 MG/DL
LDLC SERPL CALC-MCNC: 101 MG/DL
NONHDLC SERPL-MCNC: 125 MG/DL
TRIGL SERPL-MCNC: 121 MG/DL

## 2018-10-23 PROCEDURE — 80061 LIPID PANEL: CPT | Performed by: FAMILY MEDICINE

## 2018-10-23 PROCEDURE — 82550 ASSAY OF CK (CPK): CPT | Performed by: FAMILY MEDICINE

## 2018-10-23 PROCEDURE — 36415 COLL VENOUS BLD VENIPUNCTURE: CPT | Performed by: FAMILY MEDICINE

## 2018-10-25 ENCOUNTER — TELEPHONE (OUTPATIENT)
Dept: FAMILY MEDICINE | Facility: CLINIC | Age: 63
End: 2018-10-25

## 2018-10-25 ENCOUNTER — NURSE TRIAGE (OUTPATIENT)
Dept: NURSING | Facility: CLINIC | Age: 63
End: 2018-10-25

## 2018-10-25 NOTE — TELEPHONE ENCOUNTER
Called patient. Left voicemail asking to return call to clinic.    Need to relay provider result message below:  The CK test is better and the cholesterol values are still good. Is your pain improved? Call or send us a mychart message if you have questions. Yoel Jones, RN  Triage Nurse

## 2018-10-25 NOTE — TELEPHONE ENCOUNTER
"Patient calling requesting lab results. RN reviewed provider lab note with patient.     Notes Recorded by Yoel Amaro MD on 10/24/2018 at 8:10 AM  \"The CK test is better and the cholesterol values are still good. Is your pain improved? Call or send us a mychart message if you have questions. Yoel Amaro\"    Caller verbalized understanding. Denies further questions.      Dex Garcia RN  [facility]       Additional Information    [1] Other NON-URGENT information for PCP AND [2] does not require PCP response    Protocols used: PCP CALL - NO TRIAGE-ADULT-      "

## 2018-10-25 NOTE — TELEPHONE ENCOUNTER
Reason for Call:  Other lab results    Detailed comments: Patient would like to know if Dr. Amaro thinks he should start taking Lipitor again after getting the lab results. Please advise, thank you!     Phone Number Patient can be reached at: Home number on file none (home)    Best Time: anytime    Can we leave a detailed message on this number? NO    Call taken on 10/25/2018 at 2:07 PM by Vandana Manzano

## 2018-11-05 ENCOUNTER — TELEPHONE (OUTPATIENT)
Dept: FAMILY MEDICINE | Facility: CLINIC | Age: 63
End: 2018-11-05

## 2018-11-05 DIAGNOSIS — E78.5 HYPERLIPIDEMIA LDL GOAL <130: Primary | ICD-10-CM

## 2018-11-05 NOTE — TELEPHONE ENCOUNTER
Cleveland calling to talk about his lipids, he stopped the lipitor due to leg pain and his numbers went up. He is concerned they will go up even more and would like to recheck them in another month or two. Please sign off on order if ok.     Lab Results   Component Value Date    CHOL 187 10/23/2018    CHOL 136 08/30/2018     Lab Results   Component Value Date    HDL 62 10/23/2018    HDL 59 08/30/2018     Lab Results   Component Value Date     10/23/2018    LDL 66 08/30/2018     Lab Results   Component Value Date    TRIG 121 10/23/2018    TRIG 53 08/30/2018     Lab Results   Component Value Date    CHOLHDLRATIO 2.9 11/04/2015    CHOLHDLRATIO 2.7 10/28/2014

## 2018-11-05 NOTE — TELEPHONE ENCOUNTER
"Chart reviewed     Patient was given the information below from Dex Garcia RN on 10/25/18     Dex Garcia RN        10/25/18 5:05 PM   Note      Patient calling requesting lab results. RN reviewed provider lab note with patient.     Notes Recorded by Yoel Amaro MD on 10/24/2018 at 8:10 AM  \"The CK test is better and the cholesterol values are still good. Is your pain improved? Call or send us a Pow[employer] message if you have questions. Yoel Amaro\"     Caller verbalized understanding. Denies further questions.       Dex Garcia RN  [facility]         Additional Information    [1] Other NON-URGENT information for PCP AND [2] does not require PCP response    Protocols used: PCP CALL - NO TRIAGE-ADULT-        Closing encounter     Kamilah Etienne Registered Nurse   [facility]     "

## 2018-11-06 NOTE — TELEPHONE ENCOUNTER
Left message that order was signed off on and he can recheck lipids in Jan 2019.  Chayito Kapoor RN

## 2019-01-21 ENCOUNTER — OFFICE VISIT (OUTPATIENT)
Dept: OTOLARYNGOLOGY | Facility: CLINIC | Age: 64
End: 2019-01-21
Payer: COMMERCIAL

## 2019-01-21 DIAGNOSIS — R49.0 DYSPHONIA: ICD-10-CM

## 2019-01-21 DIAGNOSIS — Z78.9 RECURRENT RESPIRATORY PAPILLOMATOSIS: Primary | ICD-10-CM

## 2019-01-21 NOTE — LETTER
1/21/2019      RE: Cleveland Rodríguez  [address] [place] [address]       Dear Colleague:  Cleveland Rodríguez recently returned for follow-up at the [facility]. My clinic note from our visit is enclosed below.  Speech recognition software may have been used in the documentation below; input is reviewed before signature to the best of my ability.     I appreciate the ongoing opportunity to participate in this patient's care.    Please feel free to contact me with any questions.      Sincerely yours,  Betty Mitchell M.D., M.P.H.  , Laryngology  Director, [facility]  Otolaryngology- Head & Neck Surgery  [phone]            =====  HISTORY OF PRESENT ILLNESS:  Cleveland Rodríguez is a pleasant 63 year old male with recurrent respiratory papillomatosis (RRP) who returns in follow up today. Underwent KTP laser in clinic 10/19/18. Reports that his voice is a good deal better.      MEDICATIONS:     Current Outpatient Medications   Medication Sig Dispense Refill     aspirin 81 MG tablet Take 1 tablet by mouth daily        atorvastatin (LIPITOR) 20 MG tablet Take 1 tablet (20 mg) by mouth daily 90 tablet 3     metroNIDAZOLE (METROGEL) 1 % gel Apply 1 g topically daily apply hs for Rosacea 60 g 11     Multiple Vitamin (DAILY MULTIVITAMIN PO) Take  by mouth 2 times daily.         ALLERGIES:    Allergies   Allergen Reactions     Amoxicillin      Rash         NEW PMH/PSH: None    REVIEW OF SYSTEMS:  The patient completed a comprehensive 11 point review of systems (below), which was reviewed. Positives are as noted below.  Patient Supplied Answers to Review of Systems   ENT ROS 1/21/2019   Constitutional -   Ears, Nose, Throat -   Cardiopulmonary -   Musculoskeletal Sore or stiff joints   Endocrine Frequent urination       PHYSICAL EXAM:  General: The patient was alert and conversant, and in no acute distress.    Oral cavity/oropharynx: No masses or  lesions. Dentition unchanged since prior. Tongue mobility and palate elevation intact and symmetric.  Resp: Breathing comfortably, no stridor or stertor.  Neuro: Symmetric facial function.  Psych: Normal affect, pleasant and cooperative.  Voice/speech: Mild dysphonia characterized by breathiness and roughness.      Intake scores  Last 2 Scores for Patient-Answered VHI Questionnaire  VHI Total Score 12/7/2018 1/21/2019   VHI Total Score Incomplete 9      Last 2 Scores for Patient-Answered EAT Questionnaire  EAT Total Score 11/13/2017 10/19/2018   EAT Total Score 1 0      Last 2 Scores for Patient-Answered CSI Questionnaire  CSI Total Score 11/13/2017 10/19/2018   CSI Total Score 1 4       Procedure:   Flexible fiberoptic laryngoscopy and laryngovideostroboscopy  Indications: This procedure was warranted to evaluate the patient's laryngeal anatomy and function. Risks, benefits, and alternatives were discussed.  Description: After written informed consent was obtained, a time-out was performed to confirm patient identity, procedure, and procedure site. Topical 3% lidocaine with 0.25% phenylephrine was applied to the nasal cavities. I performed the endoscopy and no complications were apparent. Continuous and stroboscopic light were utilized to assess routine phonation and variable frequency phonation.  Performed by: Betty Mitchell MD MPH  SLP: NA  Findings: Normal nasopharynx. Normal base of tongue, valleculae, and epiglottis. Vocal fold mobility: right: normal; left: normal. Medial edges of the vocal folds: superior left true vocal fold with papilloma. Mild papilloma right post true vocal fold. Glissade was not assessable due to difficulty completing tasks. There was moderate to severe supraglottic recruitment with connected speech. Mucosa of false vocal folds, aryepiglottic folds, piriform sinuses, and posterior glottis unremarkable. Moderate papilloma right false vocal fold and small cluster left posterior false  vocal fold. Stable anterior glottic web, posterior glottic web. Airway was patent.    The addition of stroboscopy allowed evaluation of the mucosal wave.   Amplitude: right: mildly decreased; left: moderately decreased. Symmetry: intermittent symmetry. Closure pattern: complete and irregular. Closure plane: at glottic level. Phase distribution: normal.           IMPRESSION AND PLAN:   Cleveland Rodríguez returns with moderate papilloma and good vocal function. We discussed scheduled follow-up vs as needed, and he prefers to return as needed. I asked him to return before his voice gets very bad, so that laser in clinic may be an option. I appreciate the opportunity to participate in the care of this pleasant patient.         Betty Mitchell MD

## 2019-01-21 NOTE — PATIENT INSTRUCTIONS
1.  You were seen in the ENT Clinic today by Dr. Mitchell.  If you have any questions or concerns after your appointment, please call [phone].  Press option #1 for scheduling related needs.  Press option #3 for Nurse advice.  2.  Plan is to return to clinic as needed.    Vinita Ramsay RN  [facility] ENT   Head & Neck Surgery

## 2019-01-21 NOTE — PROGRESS NOTES
Dear Colleague:  Cleveland Rodríguez recently returned for follow-up at the [facility]. My clinic note from our visit is enclosed below.  Speech recognition software may have been used in the documentation below; input is reviewed before signature to the best of my ability.     I appreciate the ongoing opportunity to participate in this patient's care.    Please feel free to contact me with any questions.      Sincerely yours,  Betty Mitchell M.D., M.P.H.  , Laryngology  Director, [facility]  Otolaryngology- Head & Neck Surgery  [phone]            =====  HISTORY OF PRESENT ILLNESS:  Cleveland Rodríguez is a pleasant 63 year old male with recurrent respiratory papillomatosis (RRP) who returns in follow up today. Underwent KTP laser in clinic 10/19/18. Reports that his voice is a good deal better.      MEDICATIONS:     Current Outpatient Medications   Medication Sig Dispense Refill     aspirin 81 MG tablet Take 1 tablet by mouth daily        atorvastatin (LIPITOR) 20 MG tablet Take 1 tablet (20 mg) by mouth daily 90 tablet 3     metroNIDAZOLE (METROGEL) 1 % gel Apply 1 g topically daily apply hs for Rosacea 60 g 11     Multiple Vitamin (DAILY MULTIVITAMIN PO) Take  by mouth 2 times daily.         ALLERGIES:    Allergies   Allergen Reactions     Amoxicillin      Rash         NEW PMH/PSH: None    REVIEW OF SYSTEMS:  The patient completed a comprehensive 11 point review of systems (below), which was reviewed. Positives are as noted below.  Patient Supplied Answers to Review of Systems   ENT ROS 1/21/2019   Constitutional -   Ears, Nose, Throat -   Cardiopulmonary -   Musculoskeletal Sore or stiff joints   Endocrine Frequent urination       PHYSICAL EXAM:  General: The patient was alert and conversant, and in no acute distress.    Oral cavity/oropharynx: No masses or lesions. Dentition unchanged since prior. Tongue mobility and palate elevation intact  and symmetric.  Resp: Breathing comfortably, no stridor or stertor.  Neuro: Symmetric facial function.  Psych: Normal affect, pleasant and cooperative.  Voice/speech: Mild dysphonia characterized by breathiness and roughness.      Intake scores  Last 2 Scores for Patient-Answered VHI Questionnaire  VHI Total Score 12/7/2018 1/21/2019   VHI Total Score Incomplete 9      Last 2 Scores for Patient-Answered EAT Questionnaire  EAT Total Score 11/13/2017 10/19/2018   EAT Total Score 1 0      Last 2 Scores for Patient-Answered CSI Questionnaire  CSI Total Score 11/13/2017 10/19/2018   CSI Total Score 1 4       Procedure:   Flexible fiberoptic laryngoscopy and laryngovideostroboscopy  Indications: This procedure was warranted to evaluate the patient's laryngeal anatomy and function. Risks, benefits, and alternatives were discussed.  Description: After written informed consent was obtained, a time-out was performed to confirm patient identity, procedure, and procedure site. Topical 3% lidocaine with 0.25% phenylephrine was applied to the nasal cavities. I performed the endoscopy and no complications were apparent. Continuous and stroboscopic light were utilized to assess routine phonation and variable frequency phonation.  Performed by: Betty Mitchell MD MPH  SLP: NA  Findings: Normal nasopharynx. Normal base of tongue, valleculae, and epiglottis. Vocal fold mobility: right: normal; left: normal. Medial edges of the vocal folds: superior left true vocal fold with papilloma. Mild papilloma right post true vocal fold. Glissade was not assessable due to difficulty completing tasks. There was moderate to severe supraglottic recruitment with connected speech. Mucosa of false vocal folds, aryepiglottic folds, piriform sinuses, and posterior glottis unremarkable. Moderate papilloma right false vocal fold and small cluster left posterior false vocal fold. Stable anterior glottic web, posterior glottic web. Airway was  patent.    The addition of stroboscopy allowed evaluation of the mucosal wave.   Amplitude: right: mildly decreased; left: moderately decreased. Symmetry: intermittent symmetry. Closure pattern: complete and irregular. Closure plane: at glottic level. Phase distribution: normal.           IMPRESSION AND PLAN:   Cleveland Rodríguez returns with moderate papilloma and good vocal function. We discussed scheduled follow-up vs as needed, and he prefers to return as needed. I asked him to return before his voice gets very bad, so that laser in clinic may be an option. I appreciate the opportunity to participate in the care of this pleasant patient.

## 2019-01-23 DIAGNOSIS — E78.5 HYPERLIPIDEMIA LDL GOAL <130: ICD-10-CM

## 2019-01-23 PROCEDURE — 80061 LIPID PANEL: CPT | Performed by: FAMILY MEDICINE

## 2019-01-23 PROCEDURE — 36415 COLL VENOUS BLD VENIPUNCTURE: CPT | Performed by: FAMILY MEDICINE

## 2019-01-24 LAB
CHOLEST SERPL-MCNC: 216 MG/DL
HDLC SERPL-MCNC: 60 MG/DL
LDLC SERPL CALC-MCNC: 138 MG/DL
NONHDLC SERPL-MCNC: 156 MG/DL
TRIGL SERPL-MCNC: 89 MG/DL

## 2019-01-25 ENCOUNTER — TELEPHONE (OUTPATIENT)
Dept: FAMILY MEDICINE | Facility: CLINIC | Age: 64
End: 2019-01-25

## 2019-01-25 NOTE — TELEPHONE ENCOUNTER
Reason for Call:  Other     Detailed comments: Pt is letting care team know that based on his lab results from 1/23 he is going to start taking Lipitor 20 mg tabs everyday. He states he is not in any muscle pain as of right now    Phone Number Patient can be reached at: Home number on file none (home)    Best Time: anytime    Can we leave a detailed message on this number? YES    Call taken on 1/25/2019 at 3:49 PM by Leanne Kat

## 2019-01-28 NOTE — TELEPHONE ENCOUNTER
Patient calling back to find out if it is ok with Dr Amaro to take the Lipitor every day.  Seems he is questioning if that is ok or not.  Please call today.  Wants to start this medication asap.  OK to leave message on voicemail.

## 2019-01-29 NOTE — TELEPHONE ENCOUNTER
Cleveland will start out taking the Lipitor 10 mg Mon-Thurs and if no problems will increase to more days.   Also having a fluttering feeling in his legs after his leg massage but no fluttering any other time. I spoke with Dr. Amaro about this and he said it could be a vitamin issue. patient said he will follow up with this at his physical in the next couple of months. I did ask him if the fluttering got worse or at other times to call and get in sooner.  Chayito Kapoor RN

## 2019-03-19 ENCOUNTER — TELEPHONE (OUTPATIENT)
Dept: FAMILY MEDICINE | Facility: CLINIC | Age: 64
End: 2019-03-19

## 2019-03-19 DIAGNOSIS — E78.5 HYPERLIPIDEMIA LDL GOAL <130: Primary | ICD-10-CM

## 2019-03-19 NOTE — TELEPHONE ENCOUNTER
Reason for Call:  Other call back    Detailed comments: Patient is requesting orders for lab work to be placed to see if Lipitor is working. Patient would like a confremation call once orders are placed.     Phone Number Patient can be reached at: Home number on file none (home)    Best Time: Any     Can we leave a detailed message on this number? YES    Call taken on 3/19/2019 at 3:47 PM by Jeronimo Moura

## 2019-03-19 NOTE — TELEPHONE ENCOUNTER
Team coordinators-Please contact patient to schedule fasting lab appt.  Patient should fast 10 hours.  Sips of water and taking usual morning medications is fine.  Patient to please avoid drinking alcoholic beverages and eating fatty foods 24 hours before lab appt.      Thank you!  KO Chambers BSN, RN

## 2019-04-04 ENCOUNTER — OFFICE VISIT (OUTPATIENT)
Dept: FAMILY MEDICINE | Facility: CLINIC | Age: 64
End: 2019-04-04
Payer: COMMERCIAL

## 2019-04-04 VITALS
HEART RATE: 63 BPM | RESPIRATION RATE: 18 BRPM | SYSTOLIC BLOOD PRESSURE: 116 MMHG | DIASTOLIC BLOOD PRESSURE: 74 MMHG | BODY MASS INDEX: 26.51 KG/M2 | WEIGHT: 200 LBS | OXYGEN SATURATION: 98 % | HEIGHT: 73 IN | TEMPERATURE: 97.6 F

## 2019-04-04 DIAGNOSIS — R10.84 ABDOMINAL PAIN, GENERALIZED: Primary | ICD-10-CM

## 2019-04-04 DIAGNOSIS — M54.50 BILATERAL LOW BACK PAIN WITHOUT SCIATICA, UNSPECIFIED CHRONICITY: ICD-10-CM

## 2019-04-04 DIAGNOSIS — E78.5 HYPERLIPIDEMIA LDL GOAL <130: ICD-10-CM

## 2019-04-04 DIAGNOSIS — Z12.5 SCREENING PSA (PROSTATE SPECIFIC ANTIGEN): ICD-10-CM

## 2019-04-04 LAB
ALBUMIN UR-MCNC: NEGATIVE MG/DL
APPEARANCE UR: CLEAR
BILIRUB UR QL STRIP: NEGATIVE
COLOR UR AUTO: YELLOW
ERYTHROCYTE [DISTWIDTH] IN BLOOD BY AUTOMATED COUNT: 13.2 % (ref 10–15)
GLUCOSE UR STRIP-MCNC: NEGATIVE MG/DL
HCT VFR BLD AUTO: 43.7 % (ref 40–53)
HGB BLD-MCNC: 14.4 G/DL (ref 13.3–17.7)
HGB UR QL STRIP: NEGATIVE
KETONES UR STRIP-MCNC: NEGATIVE MG/DL
LEUKOCYTE ESTERASE UR QL STRIP: NEGATIVE
LIPASE SERPL-CCNC: 234 U/L (ref 73–393)
MCH RBC QN AUTO: 30.9 PG (ref 26.5–33)
MCHC RBC AUTO-ENTMCNC: 33 G/DL (ref 31.5–36.5)
MCV RBC AUTO: 94 FL (ref 78–100)
NITRATE UR QL: NEGATIVE
PH UR STRIP: 6 PH (ref 5–7)
PLATELET # BLD AUTO: 172 10E9/L (ref 150–450)
RBC # BLD AUTO: 4.66 10E12/L (ref 4.4–5.9)
SOURCE: NORMAL
SP GR UR STRIP: 1.01 (ref 1–1.03)
UROBILINOGEN UR STRIP-ACNC: 0.2 EU/DL (ref 0.2–1)
WBC # BLD AUTO: 4.3 10E9/L (ref 4–11)

## 2019-04-04 PROCEDURE — 99214 OFFICE O/P EST MOD 30 MIN: CPT | Performed by: FAMILY MEDICINE

## 2019-04-04 PROCEDURE — 36415 COLL VENOUS BLD VENIPUNCTURE: CPT | Performed by: FAMILY MEDICINE

## 2019-04-04 PROCEDURE — 85027 COMPLETE CBC AUTOMATED: CPT | Performed by: FAMILY MEDICINE

## 2019-04-04 PROCEDURE — 83690 ASSAY OF LIPASE: CPT | Performed by: FAMILY MEDICINE

## 2019-04-04 PROCEDURE — 81003 URINALYSIS AUTO W/O SCOPE: CPT | Performed by: FAMILY MEDICINE

## 2019-04-04 PROCEDURE — 80053 COMPREHEN METABOLIC PANEL: CPT | Performed by: FAMILY MEDICINE

## 2019-04-04 PROCEDURE — G0103 PSA SCREENING: HCPCS | Performed by: FAMILY MEDICINE

## 2019-04-04 PROCEDURE — 80061 LIPID PANEL: CPT | Performed by: FAMILY MEDICINE

## 2019-04-04 ASSESSMENT — MIFFLIN-ST. JEOR: SCORE: 1746.07

## 2019-04-04 NOTE — PROGRESS NOTES
SUBJECTIVE:   Cleveland Rodríguez is a 63 year old male who presents to clinic today for the following health issues:    Abdominal Pain      Duration: 2 weeks ago    Description (location/character/radiation): stomach/abdominal, feels dull and pain       Associated flank pain: None    Intensity:  moderate    Accompanying signs and symptoms:        Fever/Chills: no        Gas/Bloating: YES- wife saw his stomach look bloated a week ago       Nausea/vomitting: no        Diarrhea: no        Dysuria or Hematuria: no     History (previous similar pain/trauma/previous testing): no    Precipitating or alleviating factors:       Pain worse with eating/BM/urination: no       Pain relieved by BM: no     Therapies tried and outcome: extra strength relieve and it helps a little    LMP:  not applicable    Back Pain       Duration: 2 weeks ago        Specific cause: none    Description:   Location of pain: low back left  Character of pain: dull ache  Pain radiation:when driving it goes to his buttocks  New numbness or weakness in legs, not attributed to pain:  no     Intensity: Currently  mild, moderate    History:   Pain interferes with job: No  History of back problems: no prior back problems  Any previous MRI or X-rays: None  Sees a specialist for back pain:  No  Therapies tried without relief: estra strength relieve    Alleviating factors:   Improved by: extra strength relieve      Precipitating factors:  Worsened by: Standing and Sitting      Accompanying Signs & Symptoms:  Risk of Fracture:  Age >64  Risk of Cauda Equina:  None  Risk of Infection:  None  Risk of Cancer:  None  Risk of Ankylosing Spondylitis:  Onset at age <35, male, AND morning back stiffness. no     These 2 pains came on together. Overall they are improving.    The back pain seems positional worsened when he changes positions. No radicular symptoms. Worse when he is driving.     med hx, family hx, and soc hx reviewed and updated no recent travel, no new  "medications.     Ros negative for derm, const, other gi, gu symptoms    OBJECTIVE: /74 (BP Location: Left arm, Patient Position: Sitting, Cuff Size: Adult Large)   Pulse 63   Temp 97.6  F (36.4  C) (Oral)   Resp 18   Ht 1.854 m (6' 1\")   Wt 90.7 kg (200 lb)   SpO2 98%   BMI 26.39 kg/m   no apparent distress   Exam:  GENERAL APPEARANCE: healthy, alert and no distress  EYES: Eyes grossly normal to inspection  RESP: lungs clear to auscultation - no rales, rhonchi or wheezes  CV: regular rates and rhythm, normal S1 S2, no S3 or S4 and no murmur, click or rub -  ABDOMEN: tender over suprapubic area to left side. No peritoneal signs  Rectal exam: prostate symmetric w/o nodularity, no masses palpated. No hernias noted.   MS: Lumbosacral spine area reveals no local tenderness or mass.  Painful and reduced LS ROM noted. Straight leg raise is negative bilaterally. DTR's, motor strength and sensation normal.    Hip range of motion slightly reduced.   SKIN: no suspicious lesions or rashes      Results for orders placed or performed in visit on 04/04/19   CBC with platelets   Result Value Ref Range    WBC 4.3 4.0 - 11.0 10e9/L    RBC Count 4.66 4.4 - 5.9 10e12/L    Hemoglobin 14.4 13.3 - 17.7 g/dL    Hematocrit 43.7 40.0 - 53.0 %    MCV 94 78 - 100 fl    MCH 30.9 26.5 - 33.0 pg    MCHC 33.0 31.5 - 36.5 g/dL    RDW 13.2 10.0 - 15.0 %    Platelet Count 172 150 - 450 10e9/L   Lipase   Result Value Ref Range    Lipase 234 73 - 393 U/L   *UA reflex to Microscopic and Culture ([place] [facility] (except [place] and [place])   Result Value Ref Range    Color Urine Yellow     Appearance Urine Clear     Glucose Urine Negative NEG^Negative mg/dL    Bilirubin Urine Negative NEG^Negative    Ketones Urine Negative NEG^Negative mg/dL    Specific Gravity Urine 1.010 1.003 - 1.035    Blood Urine Negative NEG^Negative    pH Urine 6.0 5.0 - 7.0 pH    Protein Albumin Urine Negative NEG^Negative mg/dL    Urobilinogen Urine " 0.2 0.2 - 1.0 EU/dL    Nitrite Urine Negative NEG^Negative    Leukocyte Esterase Urine Negative NEG^Negative    Source Midstream Urine           ICD-10-CM    1. Abdominal pain, generalized R10.84 Comprehensive metabolic panel     CBC with platelets     Lipase     *UA reflex to Microscopic and Culture ([place] [facility] (except [place] and [place])   2. Screening PSA (prostate specific antigen) Z12.5 PSA, screen   3. Hyperlipidemia LDL goal <130 E78.5 Lipid panel reflex to direct LDL Fasting   4. Bilateral low back pain without sciatica, unspecified chronicity M54.5     DDX include pancreatitis, diverticulitis, mechanical low back pain/ gastritis, functional bowel disease, kidney stone.     The cbc and ua are reassuring. Will await the rest of his blood tests. He is imrooving without treatment at present. Discussed over the counter meds.

## 2019-04-04 NOTE — LETTER
April 8, 2019      Cleveland Rodríguez  [address] [place] [place] [address]        Cleveland,     Your labs are normal. The cholesterol is not as low as I would like. We could increase the dose slightly but lets let this pain resolve before we make that change. I would recommend continued use of over the counter pain such as tylenol and let me know if the symptoms do not continue to improve in the next couple weeks we could consider further testing.    Results for orders placed or performed in visit on 04/04/19   Comprehensive metabolic panel   Result Value Ref Range    Sodium 139 133 - 144 mmol/L    Potassium 4.4 3.4 - 5.3 mmol/L    Chloride 106 94 - 109 mmol/L    Carbon Dioxide 28 20 - 32 mmol/L    Anion Gap 5 3 - 14 mmol/L    Glucose 62 (L) 70 - 99 mg/dL    Urea Nitrogen 27 7 - 30 mg/dL    Creatinine 1.04 0.66 - 1.25 mg/dL    GFR Estimate 75 >60 mL/min/[1.73_m2]    GFR Estimate If Black 87 >60 mL/min/[1.73_m2]    Calcium 9.2 8.5 - 10.1 mg/dL    Bilirubin Total 0.4 0.2 - 1.3 mg/dL    Albumin 4.1 3.4 - 5.0 g/dL    Protein Total 7.5 6.8 - 8.8 g/dL    Alkaline Phosphatase 73 40 - 150 U/L    ALT 36 0 - 70 U/L    AST 30 0 - 45 U/L   CBC with platelets   Result Value Ref Range    WBC 4.3 4.0 - 11.0 10e9/L    RBC Count 4.66 4.4 - 5.9 10e12/L    Hemoglobin 14.4 13.3 - 17.7 g/dL    Hematocrit 43.7 40.0 - 53.0 %    MCV 94 78 - 100 fl    MCH 30.9 26.5 - 33.0 pg    MCHC 33.0 31.5 - 36.5 g/dL    RDW 13.2 10.0 - 15.0 %    Platelet Count 172 150 - 450 10e9/L   Lipase   Result Value Ref Range    Lipase 234 73 - 393 U/L   *UA reflex to Microscopic and Culture ([place] [facility] (except [place] and [place])   Result Value Ref Range    Color Urine Yellow     Appearance Urine Clear     Glucose Urine Negative NEG^Negative mg/dL    Bilirubin Urine Negative NEG^Negative    Ketones Urine Negative NEG^Negative mg/dL    Specific Gravity Urine 1.010 1.003 - 1.035    Blood Urine Negative NEG^Negative    pH Urine 6.0 5.0 - 7.0 pH     Protein Albumin Urine Negative NEG^Negative mg/dL    Urobilinogen Urine 0.2 0.2 - 1.0 EU/dL    Nitrite Urine Negative NEG^Negative    Leukocyte Esterase Urine Negative NEG^Negative    Source Midstream Urine    PSA, screen   Result Value Ref Range    PSA 1.79 0 - 4 ug/L   Lipid panel reflex to direct LDL Fasting   Result Value Ref Range    Cholesterol 209 (H) <200 mg/dL    Triglycerides 83 <150 mg/dL    HDL Cholesterol 68 >39 mg/dL    LDL Cholesterol Calculated 124 (H) <100 mg/dL    Non HDL Cholesterol 141 (H) <130 mg/dL               Sincerely,        Yoel Amaro MD/christy

## 2019-04-05 ENCOUNTER — TELEPHONE (OUTPATIENT)
Dept: NURSING | Facility: CLINIC | Age: 64
End: 2019-04-05

## 2019-04-05 ENCOUNTER — NURSE TRIAGE (OUTPATIENT)
Dept: NURSING | Facility: CLINIC | Age: 64
End: 2019-04-05

## 2019-04-05 DIAGNOSIS — R10.84 ABDOMINAL PAIN, GENERALIZED: Primary | ICD-10-CM

## 2019-04-05 LAB
ALBUMIN SERPL-MCNC: 4.1 G/DL (ref 3.4–5)
ALP SERPL-CCNC: 73 U/L (ref 40–150)
ALT SERPL W P-5'-P-CCNC: 36 U/L (ref 0–70)
ANION GAP SERPL CALCULATED.3IONS-SCNC: 5 MMOL/L (ref 3–14)
AST SERPL W P-5'-P-CCNC: 30 U/L (ref 0–45)
BILIRUB SERPL-MCNC: 0.4 MG/DL (ref 0.2–1.3)
BUN SERPL-MCNC: 27 MG/DL (ref 7–30)
CALCIUM SERPL-MCNC: 9.2 MG/DL (ref 8.5–10.1)
CHLORIDE SERPL-SCNC: 106 MMOL/L (ref 94–109)
CHOLEST SERPL-MCNC: 209 MG/DL
CO2 SERPL-SCNC: 28 MMOL/L (ref 20–32)
CREAT SERPL-MCNC: 1.04 MG/DL (ref 0.66–1.25)
GFR SERPL CREATININE-BSD FRML MDRD: 75 ML/MIN/{1.73_M2}
GLUCOSE SERPL-MCNC: 62 MG/DL (ref 70–99)
HDLC SERPL-MCNC: 68 MG/DL
LDLC SERPL CALC-MCNC: 124 MG/DL
NONHDLC SERPL-MCNC: 141 MG/DL
POTASSIUM SERPL-SCNC: 4.4 MMOL/L (ref 3.4–5.3)
PROT SERPL-MCNC: 7.5 G/DL (ref 6.8–8.8)
PSA SERPL-ACNC: 1.79 UG/L (ref 0–4)
SODIUM SERPL-SCNC: 139 MMOL/L (ref 133–144)
TRIGL SERPL-MCNC: 83 MG/DL

## 2019-04-05 NOTE — TELEPHONE ENCOUNTER
He will wait for MD to give the test results but patient is unclear as to what is going on with him and why he has the back pain and stomach pains. Please call the patient. Plan of care was lacking for next steps. What should he do with the pain moving forward? Should he continue taking the over the counter pain medications. Were the lab tests sent of supposed to reveal what is wrong with him? What are the next steps in tests or otherwise for a plan of care in regards to his stomach and back pains. Please call the patient.  Epic encounter sent to the patient's clinic.    Juanita Cortez RN-[facility]

## 2019-04-05 NOTE — TELEPHONE ENCOUNTER
He will wait for MD to give the test results but patient is unclear as to what is going on with him and why he has the back pain and stomach pains. Please call the patient. Plan of care was lacking for next steps. What should he do with the pain moving forward? Should he continue taking the over the counter pain medications. Were the lab tests sent of supposed to reveal what is wrong with him? What are the next steps in tests or otherwise for a plan of care in regards to his stomach and back pains. Please call the patient.  Juanita Cortez RN-[facility]

## 2019-04-06 NOTE — TELEPHONE ENCOUNTER
Cleveland, your labs are normal. The cholesterol is not as low as I would like. We could increase the dose slightly but lets let this pain resolve before we make that change. I would recommend continued use of over the counter pain such as tylenol and let me know if the symptoms do not continue to improve in the next couple weeks we could consider further testing.

## 2019-04-08 NOTE — TELEPHONE ENCOUNTER
"Dr. Amaro review:    Patient wanted to update you with some info stating that he does intermittently get some loose stools and he think he told you \"no\"  He wanted you to know.    He will call back in a week with update on how he is doing.    He would then like to proceed with the next step in figuring out what is causing is abdominal and back pain.  Nurse did relay test results and your recommendations.   Veronika Aguilar RN    "

## 2019-04-08 NOTE — TELEPHONE ENCOUNTER
Patient was upset that he did not hear back from PCP on Friday. Patient would like a call back to discuss symptoms. Please advise thank you!    Vandana Price

## 2019-04-09 NOTE — TELEPHONE ENCOUNTER
Patient is given number for radiology scheduling [phone]. He agrees with plan.   Veronika Aguilar RN

## 2019-04-09 NOTE — TELEPHONE ENCOUNTER
ABD US would be a good next step. I think would be a reasonable next step. This order has been placed.

## 2019-04-15 ENCOUNTER — HOSPITAL ENCOUNTER (OUTPATIENT)
Dept: ULTRASOUND IMAGING | Facility: CLINIC | Age: 64
Discharge: HOME | End: 2019-04-15
Attending: FAMILY MEDICINE | Admitting: FAMILY MEDICINE
Payer: COMMERCIAL

## 2019-04-15 DIAGNOSIS — R10.84 ABDOMINAL PAIN, GENERALIZED: ICD-10-CM

## 2019-04-15 PROCEDURE — 76700 US EXAM ABDOM COMPLETE: CPT

## 2019-04-16 ENCOUNTER — TELEPHONE (OUTPATIENT)
Dept: FAMILY MEDICINE | Facility: CLINIC | Age: 64
End: 2019-04-16

## 2019-04-16 NOTE — TELEPHONE ENCOUNTER
Cleveland,     The ultrasound was normal. If that pain is still present I would recommend we have you consult one of the Gastroenterology specialists.     I will have my nurse try to call you with the results to make sure you get the message.     Yoel Amaro

## 2019-04-16 NOTE — TELEPHONE ENCOUNTER
Left message to call back and ask to speak with an available triage nurse.  KO Chambers, ANGLEAN, RN

## 2019-04-16 NOTE — TELEPHONE ENCOUNTER
"Patient called back and was informed of message per Dr. Amaro.    Patient verbalized understanding and stated he had pain today but \"it wasn't like it was.\"    Writer again reviewed Dr. Amaro's recommendation and offered to place GI referral.  Patient declined referral and stated he would \"figure it out.\"    Patient informed writer the pain is in abdomen and back so he would want GI provider to address both.    Writer encouraged patient to call back if he would like GI referral.  ANGELA MalikN, RN    "

## 2019-07-29 ENCOUNTER — OFFICE VISIT (OUTPATIENT)
Dept: OTOLARYNGOLOGY | Facility: CLINIC | Age: 64
End: 2019-07-29
Payer: COMMERCIAL

## 2019-07-29 VITALS
DIASTOLIC BLOOD PRESSURE: 75 MMHG | WEIGHT: 200 LBS | SYSTOLIC BLOOD PRESSURE: 120 MMHG | HEIGHT: 73 IN | BODY MASS INDEX: 26.51 KG/M2

## 2019-07-29 DIAGNOSIS — R49.0 DYSPHONIA: ICD-10-CM

## 2019-07-29 DIAGNOSIS — D14.1 LARYNGEAL PAPILLOMATOSIS: Primary | ICD-10-CM

## 2019-07-29 ASSESSMENT — MIFFLIN-ST. JEOR: SCORE: 1746.07

## 2019-07-29 NOTE — LETTER
7/29/2019       RE: Cleveland Rodríguez  [address] [place] [address]     Dear Colleague,    Thank you for referring your patient, Cleveland Rodríguez, to the [facility] at [facility]. Please see a copy of my visit note below.    [facility]    [facility] VOICE [facility]  VOICE EVALUATION/LARYNGEAL EXAMINATION REPORT    Patient: Cleveland Rodríguez  Date of Service: 7/29/2019    HISTORY  PATIENT INFORMATION  Cleveland Rodríguez was seen for brief consultation in conjunction with a visit to Dr. Mitchell today.  Please refer to the physician s dictation for a more complete history and impressions.  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  He reported that this improved his voice significantly, and he appreciated the lack of anesthesia required.  He notes that his voice recently began to worsen in quality and presents for follow-up as a result.  Given his long history of this disease and many surgeries previously he does not feel speech services are required at this time for adequate recovery.  He was encouraged to maintain contact with clinic and reengage with speech services as needed.  No skilled services were provided as a part of this visit.      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

## 2019-07-29 NOTE — LETTER
"7/29/2019      RE: Cleveland Rodríguez  [address] [place] [address]       Dear Colleague:    Cleveland Rodríguez recently returned for follow-up at the [facility]. My clinic note from our visit is enclosed below.  Speech recognition software may have been used in the documentation below; input is reviewed before signature to the best of my ability.     I appreciate the ongoing opportunity to participate in this patient's care.    Please feel free to contact me with any questions.      Sincerely yours,  Betty Mitchell M.D., M.P.H.  , Laryngology  Director, [facility]  Otolaryngology- Head & Neck Surgery  [phone]            =====  HISTORY OF PRESENT ILLNESS:  Cleveland Rodríguez is a pleasant 63-year-old male with recurrent respiratory papillomatosis (RRP), HPV6+, who returns in follow up today. He has a history of numerous OR procedures for this. Underwent KTP laser in clinic 10/19/18. He feels his voice is \"all over the place.\" Does note greater difficulty on the phone.        MEDICATIONS:     Current Outpatient Medications   Medication Sig Dispense Refill     aspirin 81 MG tablet Take 1 tablet by mouth daily        atorvastatin (LIPITOR) 20 MG tablet Take 1 tablet (20 mg) by mouth daily 90 tablet 3     GARLIC OIL PO        KRILL OIL PO        metroNIDAZOLE (METROGEL) 1 % gel Apply 1 g topically daily apply hs for Rosacea 60 g 11     Multiple Vitamin (DAILY MULTIVITAMIN PO) Take  by mouth 2 times daily.       Omega-3 Fatty Acids (OMEGA-3 FISH OIL PO)          ALLERGIES:    Allergies   Allergen Reactions     Amoxicillin      Rash         NEW PMH/PSH: None    REVIEW OF SYSTEMS:  The patient completed a comprehensive 11 point review of systems (below), which was reviewed. Positives are as noted below.  Patient Supplied Answers to Review of Systems  [facility] ENT ROS 7/29/2019   Constitutional -   Ears, Nose, Throat -   Cardiopulmonary - "   Musculoskeletal -   Endocrine Frequent urination       PHYSICAL EXAM:  General: The patient was alert and conversant, and in no acute distress.    Resp: Breathing comfortably, no stridor or stertor.  Neuro: Symmetric facial function. Other cranial nerve function as documented above.  Psych: Normal affect, pleasant and cooperative.  Voice/speech: Mild to moderate dysphonia characterized by breathiness and roughness.      Intake scores  Last 2 Scores for Patient-Answered VHI Questionnaire  VHI Total Score 1/21/2019 7/29/2019   VHI Total Score 9 Incomplete      Last 2 Scores for Patient-Answered EAT Questionnaire  EAT Total Score 11/13/2017 10/19/2018   EAT Total Score 1 0      Last 2 Scores for Patient-Answered CSI Questionnaire  CSI Total Score 11/13/2017 10/19/2018   CSI Total Score 1 4       Procedure:   Flexible fiberoptic laryngoscopy and laryngovideostroboscopy  Indications: This procedure was warranted to evaluate the patient's laryngeal anatomy and function. Risks, benefits, and alternatives were discussed.  Description: After written informed consent was obtained, a time-out was performed to confirm patient identity, procedure, and procedure site. Topical 3% lidocaine with 0.25% phenylephrine was applied to the nasal cavities. I performed the endoscopy and no complications were apparent. Continuous and stroboscopic light were utilized to assess routine phonation and variable frequency phonation.  Performed by: Betty Mitchell MD MPH  SLP: NA  Findings: Normal nasopharynx. Normal base of tongue, valleculae, and epiglottis. Vocal fold mobility: right: normal; left: normal. Medial edges of the vocal folds: notable for bilateral papilloma, left > right. Stable infraglottic anterior web. Glissade produced appropriate elongation. There was moderate supraglottic recruitment with connected speech. Mucosa of false vocal folds, aryepiglottic folds, piriform sinuses, and posterior glottis unremarkable; stable  posterior glottic/supraglottic web. Airway was patent.   RRP also highlighted on NBI.    The addition of stroboscopy allowed evaluation of the mucosal wave.   Amplitude: right: mildly decreased; left: moderately decreased. Symmetry: associated asymmetry. Closure pattern: complete and irregular. Closure plane: at glottic level. Phase distribution: normal.             IMPRESSION AND PLAN:   Cleveland Rodríguez returns with further papilloma regrowth.     We discussed advantages and disadvantages of OR versus awake KTP laser treatment.  He would prefer to proceed with awake KTP laser treatment. We will get this scheduled over the next several weeks.     I appreciate the opportunity to participate in the care of this pleasant patient.         Betty Mitchell MD

## 2019-07-29 NOTE — PROGRESS NOTES
"Dear Colleague:    Cleveland Rodríguez recently returned for follow-up at the [facility]. My clinic note from our visit is enclosed below.  Speech recognition software may have been used in the documentation below; input is reviewed before signature to the best of my ability.     I appreciate the ongoing opportunity to participate in this patient's care.    Please feel free to contact me with any questions.      Sincerely yours,  Betty Mitchell M.D., M.P.H.  , Laryngology  Director, [facility]  Otolaryngology- Head & Neck Surgery  [phone]            =====  HISTORY OF PRESENT ILLNESS:  Cleveland Rodríguez is a pleasant 63-year-old male with recurrent respiratory papillomatosis (RRP), HPV6+, who returns in follow up today. He has a history of numerous OR procedures for this. Underwent KTP laser in clinic 10/19/18. He feels his voice is \"all over the place.\" Does note greater difficulty on the phone.        MEDICATIONS:     Current Outpatient Medications   Medication Sig Dispense Refill     aspirin 81 MG tablet Take 1 tablet by mouth daily        atorvastatin (LIPITOR) 20 MG tablet Take 1 tablet (20 mg) by mouth daily 90 tablet 3     GARLIC OIL PO        KRILL OIL PO        metroNIDAZOLE (METROGEL) 1 % gel Apply 1 g topically daily apply hs for Rosacea 60 g 11     Multiple Vitamin (DAILY MULTIVITAMIN PO) Take  by mouth 2 times daily.       Omega-3 Fatty Acids (OMEGA-3 FISH OIL PO)          ALLERGIES:    Allergies   Allergen Reactions     Amoxicillin      Rash         NEW PMH/PSH: None    REVIEW OF SYSTEMS:  The patient completed a comprehensive 11 point review of systems (below), which was reviewed. Positives are as noted below.  Patient Supplied Answers to Review of Systems   ENT ROS 7/29/2019   Constitutional -   Ears, Nose, Throat -   Cardiopulmonary -   Musculoskeletal -   Endocrine Frequent urination       PHYSICAL EXAM:  General: The patient " was alert and conversant, and in no acute distress.    Resp: Breathing comfortably, no stridor or stertor.  Neuro: Symmetric facial function. Other cranial nerve function as documented above.  Psych: Normal affect, pleasant and cooperative.  Voice/speech: Mild to moderate dysphonia characterized by breathiness and roughness.      Intake scores  Last 2 Scores for Patient-Answered VHI Questionnaire  VHI Total Score 1/21/2019 7/29/2019   VHI Total Score 9 Incomplete      Last 2 Scores for Patient-Answered EAT Questionnaire  EAT Total Score 11/13/2017 10/19/2018   EAT Total Score 1 0      Last 2 Scores for Patient-Answered CSI Questionnaire  CSI Total Score 11/13/2017 10/19/2018   CSI Total Score 1 4       Procedure:   Flexible fiberoptic laryngoscopy and laryngovideostroboscopy  Indications: This procedure was warranted to evaluate the patient's laryngeal anatomy and function. Risks, benefits, and alternatives were discussed.  Description: After written informed consent was obtained, a time-out was performed to confirm patient identity, procedure, and procedure site. Topical 3% lidocaine with 0.25% phenylephrine was applied to the nasal cavities. I performed the endoscopy and no complications were apparent. Continuous and stroboscopic light were utilized to assess routine phonation and variable frequency phonation.  Performed by: Betty Mitchell MD MPH  SLP: NA  Findings: Normal nasopharynx. Normal base of tongue, valleculae, and epiglottis. Vocal fold mobility: right: normal; left: normal. Medial edges of the vocal folds: notable for bilateral papilloma, left > right. Stable infraglottic anterior web. Glissade produced appropriate elongation. There was moderate supraglottic recruitment with connected speech. Mucosa of false vocal folds, aryepiglottic folds, piriform sinuses, and posterior glottis unremarkable; stable posterior glottic/supraglottic web. Airway was patent.   RRP also highlighted on NBI.    The  addition of stroboscopy allowed evaluation of the mucosal wave.   Amplitude: right: mildly decreased; left: moderately decreased. Symmetry: associated asymmetry. Closure pattern: complete and irregular. Closure plane: at glottic level. Phase distribution: normal.             IMPRESSION AND PLAN:   Cleveland Rodríguez returns with further papilloma regrowth.     We discussed advantages and disadvantages of OR versus awake KTP laser treatment.  He would prefer to proceed with awake KTP laser treatment. We will get this scheduled over the next several weeks.     I appreciate the opportunity to participate in the care of this pleasant patient.

## 2019-07-29 NOTE — PROGRESS NOTES
[facility] VOICE [facility] VOICE [facility]  VOICE EVALUATION/LARYNGEAL EXAMINATION REPORT    Patient: Cleveland Rodríguez  Date of Service: 7/29/2019    HISTORY  PATIENT INFORMATION  Cleveland Rodríguez was seen for brief consultation in conjunction with a visit to Dr. Mitchell today.  Please refer to the physician s dictation for a more complete history and impressions.  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  He reported that this improved his voice significantly, and he appreciated the lack of anesthesia required.  He notes that his voice recently began to worsen in quality and presents for follow-up as a result.  Given his long history of this disease and many surgeries previously he does not feel speech services are required at this time for adequate recovery.  He was encouraged to maintain contact with clinic and reengage with speech services as needed.  No skilled services were provided as a part of this visit.      No charge for today s session; charges will be billed at the completion of the evaluation  NO CHARGE FACILITY FEE (06684)    PRIMARY ICD-10 code:  R49.0 (Dysphonia)  SECONDARY ICD-10 code:  D 14.1 (laryngeal papillomatosis)     Ermias Meza M.M., M.A., CCC-SLP  Speech-Language Pathologist  Certificate of Vocology  [phone]

## 2019-07-31 ENCOUNTER — TELEPHONE (OUTPATIENT)
Dept: OTOLARYNGOLOGY | Facility: CLINIC | Age: 64
End: 2019-07-31

## 2019-07-31 NOTE — TELEPHONE ENCOUNTER
Left msg for patient to call Tori in surgery scheduling for Dr Mitchell.     Tori Rosa   ENT Mounika-Op Coordinator  [phone]  [email or web address]

## 2019-08-21 ENCOUNTER — DOCUMENTATION ONLY (OUTPATIENT)
Dept: OTOLARYNGOLOGY | Facility: CLINIC | Age: 64
End: 2019-08-21

## 2019-08-21 NOTE — PROGRESS NOTES
Patient is scheduled for surgery with Dr. 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

## 2019-09-04 ENCOUNTER — TELEPHONE (OUTPATIENT)
Dept: OTOLARYNGOLOGY | Facility: CLINIC | Age: 64
End: 2019-09-04

## 2019-09-04 NOTE — TELEPHONE ENCOUNTER
After discussing below with patient, he decided he would like to try it without anti-anxiety medication.  I informed Dr. Mitchell.

## 2019-09-04 NOTE — TELEPHONE ENCOUNTER
"Per Dr. Mitchell:    Hi All,   I'm being told it is okay if he takes an oral anti-anxiety med. I don't want sedation as he needs to be able to participate, so no need to move the procedure (despite the incorrect name).     He will need a  to take him home if he takes the medication, and will also be asked not to drive for 24 hours. I am not sure who is calling the patient since there are multiple people on here, but please be sure he understands about the driving restriction.     I will add the pre-op med order to his order set. He will need to come a little earlier (aim for 12:45 instead of 1 pm) to allow for pharmacy to send it up. He can take it once I have come by and helped him complete the informed consent form.     Thanks everyone for your help,   SM     \"diazepam 1 mg PO ONCE, to be taken 15m prior to procedure). Dx code: Other specified anxiety disorder\"    ----- Message from Betty Mitchell MD sent at 9/3/2019  4:12 PM CDT -----  Hi all,  Thanks for the info. I do not feel comfortable with any significant sedation for this as he needs to be awake enough to participate. However, I would be open to a small dose of an anti-anxiety medication, if it is permitted in the ASC procedure suite. I have a message out to the ASC procedures team to ask about whether we can do this, or if special arrangements need to be made.    Just to clarify, this case is NOT a microdirect laryngoscopy, which would be under general anesthesia-- it will be a transnasal laryngoscopy with KTP laser ablation of recurrent respiratory papilloma. I am correcting the orders to reflect this. I do see that it is correctly listed in the Procedure suite.    I'll let you know when I hear back on whether we could do some low-dose relaxing medications to help make the procedure more comfortable for him.    Thanks,  SM      ----- Message -----  From: Giorgi Catherine RN  Sent: 9/3/2019  12:56 PM  To: Nathalie Grace RN, Scott Amos, EMT, " #    Good afternoon Dr. Mitchell,  I received below's message, patient is having Microdirect Laryngoscopy with Excision of Laryngeal Papilloma with KTP Laser 09/06/2019.  Patient is requesting sedation instead of local, is that possible?    Please advise when able, thank you!    ----- Message -----  From: Tori Rosa  Sent: 9/3/2019  11:27 AM  To: Nathalie Grace RN, Scott Amos, EMT, #    Nathalie,    I have forwarded this to Dr Mitchell's nurse Giorgi and EMT Scott.  They should be able to answer the questions.     Thank you,      Tori Rosa   ENT Mounika-Op Coordinator  [phone]  [email or web address]    ----- Message -----  From: Nathalie Grace RN  Sent: 9/3/2019  11:13 AM  To: Tori Rosa    Patient would like sedation for this procedure. Not sure if  does it for this or not. Would have to go over NPO instructions for patient if he does get sedation vs just local. He wants to be called back to see if he can get sedation for this procedure.

## 2019-09-06 ENCOUNTER — HOSPITAL ENCOUNTER (OUTPATIENT)
Facility: AMBULATORY SURGERY CENTER | Age: 64
End: 2019-09-06
Attending: OTOLARYNGOLOGY
Payer: COMMERCIAL

## 2019-09-06 VITALS
HEART RATE: 70 BPM | WEIGHT: 200 LBS | TEMPERATURE: 97.6 F | RESPIRATION RATE: 18 BRPM | DIASTOLIC BLOOD PRESSURE: 94 MMHG | OXYGEN SATURATION: 100 % | HEIGHT: 74 IN | SYSTOLIC BLOOD PRESSURE: 142 MMHG | BODY MASS INDEX: 25.67 KG/M2

## 2019-09-06 RX ORDER — LIDOCAINE HYDROCHLORIDE 20 MG/ML
INJECTION, SOLUTION INFILTRATION; PERINEURAL PRN
Status: DISCONTINUED | OUTPATIENT
Start: 2019-09-06 | End: 2019-09-06 | Stop reason: HOSPADM

## 2019-09-06 ASSESSMENT — MIFFLIN-ST. JEOR: SCORE: 1761.94

## 2019-09-06 NOTE — OP NOTE
PROCEDURE(S):  Transnasal flexible laryngoscopy  KTP laser treatment of recurrent respiratory papillomatosis (RRP) under flexible laryngoscopic visualization    PRE-OPERATIVE DIAGNOSIS:   Recurrent respiratory papillomatosis (RRP)     POST-OPERATIVE DIAGNOSIS:   Recurrent respiratory papillomatosis (RRP)     SURGEON: Betty Mitchell MD MPH    ASSISTANT(S): ESTELITA    ANAESTHESIA: Topical local anesthesia    INDICATIONS FOR PROCEDURE:   The patient is a 63 year old male with recurrent respiratory papillomatosis (RRP). Risks, benefits, and alternatives of the procedure were discussed, including the risk of epistaxis, temporary/permanent hoarseness, scarring, injury to surrounding structures, and need for additional procedures. The patient signed written informed consent.    DESCRIPTION OF PROCEDURE:   After written informed consent was obtained, a time-out was performed to confirm patient identity, procedure, and procedure site. Topical aerosolized 3% lidocaine with 0.25% phenylephrine was applied to both nasal cavities. Flexible fiberoptic laryngoscopy was performed with good visualization of the larynx. 20 cc of 2% lidocaine was then topically applied to the vocal folds through the channel of the endoscope sleeve. The patient was asked to cough, gargle and expectorate. The endoscope was removed. When adequate topical anesthesia had been obtained, the 0.4 mm laser fiber was placed in the channel and the endoscope was then replaced. The fiber was advanced so 2-3 mm was visible beyond the tip of the endoscope. The patient was asked to breathe quietly and the laser was used to photocoagulate the lesions. The procedure was concluded when adequate photocoagulation had been achieved. The patient tolerated the procedure well.    Laser settings:   nm laser, 26 W, spot size 400um, 15 ms pulses, 2 pulses/second. 399 total Joules.  All in the room, including staff and patient, wore appropriate eye protection during the  procedure.    FINDINGS:   Normal nasopharynx. Normal base of tongue, valleculae, and epiglottis. Papilloma treated on bilateral true, bilateral false vocal folds, right aryepiglottic fold. Bilateral good vocal fold mobility. Stable posterior and anterior glottic webs. Airway was patent.    SPECIMEN(S):   None    DRAINS:   None    ESTIMATED BLOOD LOSS:   Minimal    COMPLICATIONS:   None    DISPOSITION: Stable to home    ATTENDING PRESENCE STATEMENT:  I performed this procedure.    PLAN: Voice rest for 3-4 days, followed by gradual resumption of gentle voice use. Follow up in 6 weeks.

## 2019-09-06 NOTE — PROGRESS NOTES
Pre-procedure H&P  No changes in health since last seen  Breathing comfortably  Declines diazepam  Plan to proceed as scheduled.

## 2019-09-06 NOTE — DISCHARGE INSTRUCTIONS
[facility]  Home Care Following Your Procedure  Call a doctor if you have signs of infection (fever, growing tenderness at the surgery site, a large amount of drainage or bleeding, severe pain, foul-smelling drainage, redness, swelling).         Tylenol/Acetaminophen Consumption  To help encourage the safe use of acetaminophen, the makers of TYLENOL  have lowered the maximum daily dose for single-ingredient Extra Strength TYLENOL  (acetaminophen) products sold in the [place] from 8 pills per day (4,000 mg) to 6 pills per day (3,000 mg). The dosing interval has also changed from 2 pills every 4-6 hours to 2 pills every 6 hours.    If you feel your pain relief is insufficient, you may take Tylenol/Acetaminophen in addition to your narcotic pain medication.     Be careful not to exceed 3,000 mg of Tylenol/Acetaminophen in a 24 hour period from all sources.    If you are taking extra strength Tylenol/acetaminophen (500 mg), the maximum dose is 6 tablets in 24 hours.    If you are taking regular strength acetaminophen (325 mg), the maximum dose is 9 tablets in 24 hours.  Your doctor is:  Dr. Betty Mitchell, ENT Otolaryngology: [phone]                                    Or dial [phone] and ask for the resident on call for:  ENT Otolaryngology  For emergency care, call the:  [place]:  [phone] (TTY for hearing impaired: [phone])

## 2019-09-12 ENCOUNTER — OFFICE VISIT (OUTPATIENT)
Dept: FAMILY MEDICINE | Facility: CLINIC | Age: 64
End: 2019-09-12
Payer: COMMERCIAL

## 2019-09-12 VITALS
TEMPERATURE: 97.9 F | DIASTOLIC BLOOD PRESSURE: 66 MMHG | RESPIRATION RATE: 16 BRPM | HEIGHT: 74 IN | SYSTOLIC BLOOD PRESSURE: 100 MMHG | WEIGHT: 197 LBS | BODY MASS INDEX: 25.28 KG/M2 | OXYGEN SATURATION: 98 % | HEART RATE: 74 BPM

## 2019-09-12 DIAGNOSIS — Z00.00 ENCOUNTER FOR MEDICARE ANNUAL WELLNESS EXAM: Primary | ICD-10-CM

## 2019-09-12 DIAGNOSIS — E78.5 HYPERLIPIDEMIA LDL GOAL <130: ICD-10-CM

## 2019-09-12 DIAGNOSIS — Z13.6 SCREENING FOR AAA (ABDOMINAL AORTIC ANEURYSM): ICD-10-CM

## 2019-09-12 LAB
CHOLEST SERPL-MCNC: 168 MG/DL
HDLC SERPL-MCNC: 72 MG/DL
LDLC SERPL CALC-MCNC: 83 MG/DL
NONHDLC SERPL-MCNC: 96 MG/DL
TRIGL SERPL-MCNC: 67 MG/DL

## 2019-09-12 PROCEDURE — G0438 PPPS, INITIAL VISIT: HCPCS | Performed by: FAMILY MEDICINE

## 2019-09-12 PROCEDURE — 80061 LIPID PANEL: CPT | Performed by: FAMILY MEDICINE

## 2019-09-12 PROCEDURE — 90662 IIV NO PRSV INCREASED AG IM: CPT | Performed by: FAMILY MEDICINE

## 2019-09-12 PROCEDURE — 36415 COLL VENOUS BLD VENIPUNCTURE: CPT | Performed by: FAMILY MEDICINE

## 2019-09-12 PROCEDURE — G0009 ADMIN PNEUMOCOCCAL VACCINE: HCPCS | Performed by: FAMILY MEDICINE

## 2019-09-12 PROCEDURE — 90670 PCV13 VACCINE IM: CPT | Performed by: FAMILY MEDICINE

## 2019-09-12 PROCEDURE — G0008 ADMIN INFLUENZA VIRUS VAC: HCPCS | Performed by: FAMILY MEDICINE

## 2019-09-12 ASSESSMENT — MIFFLIN-ST. JEOR: SCORE: 1748.34

## 2019-09-12 NOTE — NURSING NOTE
Prior to immunization administration, verified patients identity using patient s name and date of birth. Please see Immunization Activity for additional information.     Screening Questionnaire for Adult Immunization    Are you sick today?   No   Do you have allergies to medications, food, a vaccine component or latex?   No   Have you ever had a serious reaction after receiving a vaccination?   No   Do you have a long-term health problem with heart disease, lung disease, asthma, kidney disease, metabolic disease (e.g. diabetes), anemia, or other blood disorder?   No   Do you have cancer, leukemia, HIV/AIDS, or any other immune system problem?   No   In the past 3 months, have you taken medications that affect  your immune system, such as prednisone, other steroids, or anticancer drugs; drugs for the treatment of rheumatoid arthritis, Crohn s disease, or psoriasis; or have you had radiation treatments?   No   Have you had a seizure, or a brain or other nervous system problem?   No   During the past year, have you received a transfusion of blood or blood     products, or been given immune (gamma) globulin or antiviral drug?   No   For women: Are you pregnant or is there a chance you could become        pregnant during the next month?   No   Have you received any vaccinations in the past 4 weeks?   No     Immunization questionnaire answers were all negative.        Per orders of Dr. Amaro, injection of [id number] given by Jessica Davidson MA. Patient instructed to remain in clinic for 15 minutes afterwards, and to report any adverse reaction to me immediately.       Screening performed by Jessica Davidson MA on 9/12/2019 at 8:09 AM.      Clinic Administered Medication Documentation    MEDICATION LIST:   Injectable Medication Documentation    Patient was given Prevnar 13. Prior to medication administration, verified patients identity using patient s name and date of birth. Please see MAR and medication order for additional  information. Patient instructed to remain in clinic for 15 minutes.      Was entire vial of medication used? Yes  Expiration Date:  04/30/2021  Was this medication supplied by the patient? No     Jessica Davidson MA on 9/12/2019 at 8:10 AM

## 2019-09-12 NOTE — PATIENT INSTRUCTIONS
Patient Education   Personalized Prevention Plan  You are due for the preventive services outlined below.  Your care team is available to assist you in scheduling these services.  If you have already completed any of these items, please share that information with your care team to update in your medical record.  Health Maintenance Due   Topic Date Due     HIV Screening  09/30/1970     Zoster (Shingles) Vaccine (2 of 3) 01/12/2016     Discuss Advance Care Planning  09/14/2016     FALL RISK ASSESSMENT  09/30/2018     Pneumococcal Vaccine (1 of 2 - PCV13) 09/30/2018     AORTIC ANEURYSM SCREENING (SYSTEM ASSIGNED)  09/30/2018     Flu Vaccine (1) 09/01/2019     Annual Wellness Visit  08/30/2019

## 2019-09-12 NOTE — PROGRESS NOTES
"  SUBJECTIVE:   Cleveland Rodríguez is a 63 year old male who presents for Preventive Visit.  Are you in the first 12 months of your Medicare Part B coverage?  No    Physical Health:    In general, how would you rate your overall physical health? excellent    Outside of work, how many days during the week do you exercise? 2-3 days/week    Outside of work, approximately how many minutes a day do you exercise?30-45 minutes    If you drink alcohol do you typically have >3 drinks per day or >7 drinks per week? No    Do you usually eat at least 4 servings of fruit and vegetables a day, include whole grains & fiber and avoid regularly eating high fat or \"junk\" foods? Yes    Do you have any problems taking medications regularly?  No    Do you have any side effects from medications? none    Needs assistance for the following daily activities: no assistance needed    Which of the following safety concerns are present in your home?  none identified     Hearing impairment: No    In the past 6 months, have you been bothered by leaking of urine? no    Mental Health:    In general, how would you rate your overall mental or emotional health? excellent  PHQ-2 Score:    PHQ-2 Score:     PHQ-2 (  [employer]) 2019   Q1: Little interest or pleasure in doing things 0 0   Q2: Feeling down, depressed or hopeless 0 0   PHQ-2 Score 0 0   Q1: Little interest or pleasure in doing things - Not at all   Q2: Feeling down, depressed or hopeless - Not at all   PHQ-2 Score - 0         Do you feel safe in your environment? Yes    Do you have a Health Care Directive? No: Advance care planning was reviewed with patient; patient declined at this time.    Additional concerns to address?  No    Fall risk:  Fallen 2 or more times in the past year?: No  Any fall with injury in the past year?: No    Cognitive Screenin) Repeat 3 items (Leader, Season, Table)    2) Clock draw: NORMAL  3) 3 item recall: Recalls 3 objects  Results: NORMAL " "clock, 1-2 items recalled: COGNITIVE IMPAIRMENT LESS LIKELY    Mini-CogTM Copyright TAMMY Mc. Licensed by the author for use in [facility]; reprinted with permission (diana@[facility]). All rights reserved.          Reviewed and updated as needed this visit by clinical staff  Tobacco  Allergies  Meds  Med Hx  Surg Hx  Fam Hx  Soc Hx        Reviewed and updated as needed this visit by Provider        Social History     Tobacco Use     Smoking status: Never Smoker     Smokeless tobacco: Never Used   Substance Use Topics     Alcohol use: Yes     Comment: social                           Current providers sharing in care for this patient include:   Patient Care Team:  Yoel Amaro MD as PCP - General (Family Practice)  Yoel Amaro MD as Assigned PCP  Betty Mitchell MD as MD (Otolaryngology)  Elizabeth Vargas RN as Registered Nurse (ENT-Otolaryngology)    The following health maintenance items are reviewed in Epic and correct as of today:  Health Maintenance   Topic Date Due     HIV SCREENING  09/30/1970     ZOSTER IMMUNIZATION (2 of 3) 01/12/2016     ADVANCE CARE PLANNING  09/14/2016     FALL RISK ASSESSMENT  09/30/2018     PNEUMOCOCCAL IMMUNIZATION 65+ LOW/MEDIUM RISK (1 of 2 - PCV13) 09/30/2018     AORTIC ANEURYSM SCREENING (SYSTEM ASSIGNED)  09/30/2018     INFLUENZA VACCINE (1) 09/01/2019     MEDICARE ANNUAL WELLNESS VISIT  08/30/2019     LIPID  04/04/2020     DTAP/TDAP/TD IMMUNIZATION (3 - Td) 09/26/2022     COLONOSCOPY  11/04/2023     HEPATITIS C SCREENING  Completed     PHQ-2  Completed     IPV IMMUNIZATION  Aged Out     MENINGITIS IMMUNIZATION  Aged Out     Lab work is in process      ROS:  Constitutional, HEENT, cardiovascular, pulmonary, gi and gu systems are negative, except as otherwise noted.    OBJECTIVE:   /66 (BP Location: Left arm, Patient Position: Sitting, Cuff Size: Adult Large)   Pulse 74   Temp 97.9  F (36.6  C) (Oral)   Resp 16   Ht 1.88 m (6' 2\")   Wt " "89.4 kg (197 lb)   SpO2 98%   BMI 25.29 kg/m   Estimated body mass index is 25.29 kg/m  as calculated from the following:    Height as of this encounter: 1.88 m (6' 2\").    Weight as of this encounter: 89.4 kg (197 lb).  EXAM:   GENERAL: healthy, alert and no distress  EYES: Eyes grossly normal to inspection, PERRL and conjunctivae and sclerae normal  HENT: ear canals and TM's normal, nose and mouth without ulcers or lesions  NECK: no adenopathy, no asymmetry, masses, or scars and thyroid normal to palpation  RESP: lungs clear to auscultation - no rales, rhonchi or wheezes  CV: regular rate and rhythm, normal S1 S2, no S3 or S4, no murmur, click or rub, no peripheral edema and peripheral pulses strong  ABDOMEN: soft, nontender, no hepatosplenomegaly, no masses and bowel sounds normal  MS: no gross musculoskeletal defects noted, no edema  SKIN: no suspicious lesions or rashes  NEURO: Normal strength and tone, mentation intact and speech normal  PSYCH: mentation appears normal, affect normal/bright    Diagnostic Test Results:  Labs reviewed in Epic    ASSESSMENT / PLAN:   1. Encounter for Medicare annual wellness exam  Discussed HCM. Immunizations discussed and updated where needed.   - HC FLU VACCINE, INCREASED ANTIGEN, PRESV FREE [16433]  - Pneumococcal vaccine 13 valent PCV13 IM (Prevnar) [46677]  - ADMIN: Vaccine, Initial (27752)    2. Hyperlipidemia LDL goal <130     - Lipid panel reflex to direct LDL Fasting    End of Life Planning:  Patient currently has an advanced directive: No.  I have verified the patient's ablity to prepare an advanced directive/make health care decisions.  Literature was provided to assist patient in preparing an advanced directive.    COUNSELING:  Reviewed preventive health counseling, as reflected in patient instructions       Regular exercise       Healthy diet/nutrition       Immunizations    Vaccinated for: Influenza and Pneumococcal    He was instructed to get shingrix at his " "pharmacy         Prostate cancer screening    Estimated body mass index is 25.29 kg/m  as calculated from the following:    Height as of this encounter: 1.88 m (6' 2\").    Weight as of this encounter: 89.4 kg (197 lb).         reports that he has never smoked. He has never used smokeless tobacco.      Appropriate preventive services were discussed with this patient, including applicable screening as appropriate for cardiovascular disease, diabetes, osteopenia/osteoporosis, and glaucoma.  As appropriate for age/gender, discussed screening for colorectal cancer, prostate cancer, breast cancer, and cervical cancer. Checklist reviewing preventive services available has been given to the patient.    Reviewed patients plan of care and provided an AVS. The Intermediate Care Plan ( asthma action plan, low back pain action plan, and migraine action plan) for Cleveland meets the Care Plan requirement. This Care Plan has been established and reviewed with the Patient.    Counseling Resources:  ATP IV Guidelines  Pooled Cohorts Equation Calculator  Breast Cancer Risk Calculator  FRAX Risk Assessment  ICSI Preventive Guidelines  Dietary Guidelines for Americans, 2010  USDA's MyPlate  ASA Prophylaxis  Lung CA Screening    Yoel Amaro MD  [facility]  "

## 2019-09-16 ENCOUNTER — TELEPHONE (OUTPATIENT)
Dept: OTOLARYNGOLOGY | Facility: CLINIC | Age: 64
End: 2019-09-16

## 2019-09-16 NOTE — TELEPHONE ENCOUNTER
Mr Gunderson called to schedule his post op appt from 9/6/19 procedure.    Pt wants am early only and said he should be seen week of 10/14/19.  On 10/14/19 and 10/21/19 there were afternoon openings only and he refused those.    I review calendar and said I could check into 10/4 - but again he refused that date.     He is now schedule for 10/28/19 at 7:15 am.  This is actually 8 wks after procedure.    Thank you,    Tori Rosa   ENT Mounika-Op Coordinator  [phone]  [email or web address]

## 2019-09-17 ENCOUNTER — TELEPHONE (OUTPATIENT)
Dept: FAMILY MEDICINE | Facility: CLINIC | Age: 64
End: 2019-09-17

## 2019-09-17 ENCOUNTER — NURSE TRIAGE (OUTPATIENT)
Dept: NURSING | Facility: CLINIC | Age: 64
End: 2019-09-17

## 2019-09-17 NOTE — TELEPHONE ENCOUNTER
"Caller states he had a \"physical exam last week\" and states he has not received the Lab results.  Per Epic chart this RN verified with Caller that a Lab \"Lipid Panel\" was ordered 9/12/19.   This RN notes the results have not been signed off on by PCP.    Currently Caller requests PCP to mail the Lab results to Caller. States is not available by telephone message. [facility] address is on file.    Protocol-  Information Only Call- Adult  Care advice reviewed.   Disposition-  Information given.   Will send a Telephone message to PCP per Caller request.  Message sent to Pool .  Caller states understanding of the recommended disposition.   Advised to call back if further questions or concerns.     HIPOLITO Goodman RN  [facility] [facility]     Reason for Disposition    [1] Caller requesting NON-URGENT health information AND [2] PCP's office is the best resource    Protocols used: INFORMATION ONLY CALL-A-AH    "

## 2019-09-17 NOTE — LETTER
September 18, 2019      Cleveland Rodríguez  [address] [place] [address]        Dear Cleveland,       The cholesterol values look great.       Sincerely,    Yoel Amaro MD

## 2019-09-17 NOTE — TELEPHONE ENCOUNTER
"Clinic Action Needed:  Yes    Reason for Call:   Please mail 9/12/19 Lab results to Patient .    Caller states he had a \"physical exam last week\" and states he has not received the Lab results.  Per Epic chart this RN verified with Caller that a Lab \"Lipid Panel\" was ordered 9/12/19.   This RN notes the results have not been signed off on by PCP.    Currently Caller requests PCP to mail the Lab results to Caller. States is not available by telephone message. [facility] address is on file.    Protocol-  Information Only Call- Adult  Care advice reviewed.   Disposition-  Information given.   Will send a Telephone message to PCP per Caller request.  Caller states understanding of the recommended disposition.   Advised to call back if further questions or concerns.     Routed to:  Dr. KWAKU Amaro   Family Practice / [facility] / [place] .      Pearl Justin, ANGELAN RN  [facility]               "

## 2019-09-18 NOTE — TELEPHONE ENCOUNTER
My fault. I think I did sign off on them but did not route them to be sent nor write a commend. Not ben how I missed them. The cholesterol values look great.     Please mail out.     Yoel Amaro MD

## 2019-10-01 DIAGNOSIS — E78.5 HYPERLIPIDEMIA LDL GOAL <130: ICD-10-CM

## 2019-10-02 NOTE — TELEPHONE ENCOUNTER
"Requested Prescriptions   Pending Prescriptions Disp Refills     atorvastatin (LIPITOR) 20 MG tablet [Pharmacy Med Name: ATORVASTATIN 20 MG TABLET]  Last Written Prescription Date:  8/30/2018  Last Fill Quantity: 90 tab,  # refills: 3   Last Office Visit: 9/12/2019 :Prem  Future Office Visit:      90 tablet 3     Sig: TAKE 1 TABLET BY MOUTH EVERY DAY       Statins Protocol Passed - 10/1/2019 10:25 AM        Passed - LDL on file in past 12 months     Recent Labs   Lab Test 09/12/19  0819   LDL 83             Passed - No abnormal creatine kinase in past 12 months     Recent Labs   Lab Test 10/23/18  0737                   Passed - Recent (12 mo) or future (30 days) visit within the authorizing provider's specialty     Patient has had an office visit with the authorizing provider or a provider within the authorizing providers department within the previous 12 mos or has a future within next 30 days. See \"Patient Info\" tab in inbasket, or \"Choose Columns\" in Meds & Orders section of the refill encounter.              Passed - Medication is active on med list        Passed - Patient is age 18 or older        "

## 2019-10-03 RX ORDER — ATORVASTATIN CALCIUM 20 MG/1
TABLET, FILM COATED ORAL
Qty: 90 TABLET | Refills: 3 | Status: SHIPPED | OUTPATIENT
Start: 2019-10-03

## 2019-10-03 NOTE — TELEPHONE ENCOUNTER
Prescription approved per [facility] according to the [facility] Refill Protocol.  Nelli Soler RN

## 2019-10-03 NOTE — TELEPHONE ENCOUNTER
The patient is out of his medication and is requesting an expedited refill as he is leaving town tomorrow.

## 2019-10-21 ENCOUNTER — TELEPHONE (OUTPATIENT)
Dept: OTOLARYNGOLOGY | Facility: CLINIC | Age: 64
End: 2019-10-21

## 2019-10-21 NOTE — TELEPHONE ENCOUNTER
Spoke to patient and advised pt as far as this writer could see, he is only scheduled to see provider, not panel. This is a post op follow up visit so a panel would probably not be needed. Pt verbalized understanding.

## 2019-10-21 NOTE — TELEPHONE ENCOUNTER
[facility] Call Center    Phone Message    May a detailed message be left on voicemail: yes    Reason for Call: Other: Pt has an appointment on 10/28 and wanted to be sure that he will only be seeing provider in  this appointment. Pt stated he received a letter that said his appointment could involve a team and he is just wanting to follow up with provider only. Please follow up with anhy questions.     Action Taken: Message routed to:  [facility] ([facility] ENT

## 2019-11-04 ENCOUNTER — OFFICE VISIT (OUTPATIENT)
Dept: OTOLARYNGOLOGY | Facility: CLINIC | Age: 64
End: 2019-11-04
Payer: COMMERCIAL

## 2019-11-04 VITALS — HEIGHT: 74 IN | BODY MASS INDEX: 25.67 KG/M2 | WEIGHT: 200 LBS

## 2019-11-04 DIAGNOSIS — Z78.9 RECURRENT RESPIRATORY PAPILLOMATOSIS: Primary | ICD-10-CM

## 2019-11-04 DIAGNOSIS — R49.0 DYSPHONIA: ICD-10-CM

## 2019-11-04 ASSESSMENT — PAIN SCALES - GENERAL: PAINLEVEL: NO PAIN (0)

## 2019-11-04 ASSESSMENT — MIFFLIN-ST. JEOR: SCORE: 1756.94

## 2019-11-04 NOTE — NURSING NOTE
"Chief Complaint   Patient presents with     RECHECK     DOS 9/6/19, KTP laser     Height 1.88 m (6' 2\"), weight 90.7 kg (200 lb).    Cheryl Sainz, EMT    "

## 2019-11-04 NOTE — LETTER
11/4/2019      RE: Cleveland Rodríguez  [address] [place] [address]       Dear Colleague:    Cleveland Rodríguez recently returned for follow-up at the [facility]. My clinic note from our visit is enclosed below.  Speech recognition software may have been used in the documentation below; input is reviewed before signature to the best of my ability.     I appreciate the ongoing opportunity to participate in this patient's care.    Please feel free to contact me with any questions.    Sincerely yours,  Betty Mitchell M.D., M.P.H.  , Laryngology  Director, [facility]  Otolaryngology- Head & Neck Surgery  [phone]  =====  HISTORY OF PRESENT ILLNESS:  Cleveland Rodríguez is a pleasant 64-year-old male with recurrent respiratory papillomatosis (RRP), HPV6+, who returns in follow up today. He has a history of numerous operating room procedures for this.     Most recent OR procedure: 10/26/17.  Most recent procedure: awake KTP laryngoscopy 9/6/19     He feels his voice has not fully bounced back. At times it is very good, at other times it is terrible. He has not really had time to participate in voice therapy.    MEDICATIONS:     Current Outpatient Medications   Medication Sig Dispense Refill     aspirin 81 MG tablet Take 1 tablet by mouth daily        atorvastatin (LIPITOR) 20 MG tablet TAKE 1 TABLET BY MOUTH EVERY DAY 90 tablet 3     GARLIC OIL PO        KRILL OIL PO        metroNIDAZOLE (METROGEL) 1 % gel Apply 1 g topically daily apply hs for Rosacea 60 g 11     Multiple Vitamin (DAILY MULTIVITAMIN PO) Take  by mouth 2 times daily.       Omega-3 Fatty Acids (OMEGA-3 FISH OIL PO)        ALLERGIES:    Allergies   Allergen Reactions     Amoxicillin      Rash       NEW PMH/PSH: None    REVIEW OF SYSTEMS:  The patient completed a comprehensive 11 point review of systems (below), which was reviewed. Positives are as noted below.  Patient Supplied  Answers to Review of Systems   ENT ROS 7/29/2019   Constitutional -   Ears, Nose, Throat -   Cardiopulmonary -   Musculoskeletal -   Endocrine Frequent urination     PHYSICAL EXAM:  General: The patient was alert and conversant, and in no acute distress.    Oral cavity/oropharynx: No masses or lesions. Dentition unchanged since prior. Tongue mobility and palate elevation intact and symmetric.  Neck: No palpable cervical lymphadenopathy, no significant tenderness to palpation of the thyrohyoid space, which was narrow. No obvious thyroid abnormality.  Resp: Breathing comfortably, no stridor or stertor.  Neuro: Symmetric facial function. Other cranial nerve function as documented above.  Psych: Normal affect, pleasant and cooperative.  Voice/speech: Mild to moderate dysphonia characterized by breathiness, roughness, strain and inconsistency.    Intake scores  Last 2 Scores for Patient-Answered VHI Questionnaire  VHI Total Score 1/21/2019 7/29/2019   VHI Total Score 9 Incomplete      Last 2 Scores for Patient-Answered EAT Questionnaire  EAT Total Score 11/13/2017 10/19/2018   EAT Total Score 1 0      Last 2 Scores for Patient-Answered CSI Questionnaire  CSI Total Score 11/13/2017 10/19/2018   CSI Total Score 1 4     Procedure:   Flexible fiberoptic laryngoscopy and laryngovideostroboscopy  Indications: This procedure was warranted to evaluate the patient's laryngeal anatomy and function. Risks, benefits, and alternatives were discussed.  Description: After written informed consent was obtained, a time-out was performed to confirm patient identity, procedure, and procedure site. Topical 3% lidocaine with 0.25% phenylephrine was applied to the nasal cavities. I performed the endoscopy and no complications were apparent. Continuous and stroboscopic light were utilized to assess routine phonation and variable frequency phonation.  Performed by: Betty Mitchell MD MPH  SLP: NA  Findings: Normal nasopharynx. Normal base  of tongue, valleculae, and epiglottis. Vocal fold mobility: right: normal; left: normal. Medial edges of the vocal folds: smooth and straight. Scattered papilloma, mostly laterally, though left true vocal fold has broad presumed sessile papilloma without focal clusters. Glissade produced appropriate elongation. There was moderate supraglottic recruitment with connected speech. Mucosa of false vocal folds, aryepiglottic folds, piriform sinuses, and posterior glottis unremarkable. Stable anterior and posterior laryngeal webs. Airway was patent.        The addition of stroboscopy allowed evaluation of the mucosal wave.   Amplitude: right: normal; left: moderately decreased. Symmetry: associated asymmetry. Closure pattern: complete. Closure plane: at glottic level. Phase distribution: normal.           IMPRESSION AND PLAN:   Cleveland Rodríguez returns with dramatically reduced papilloma burden. His voice quality remains highly inconsistent.     We had a long discussion today about the role of speech therapy in helping him use his best voice.  Unfortunately, his work schedule does not allow for him to participate in the near future, but he may make an appointment a few months out if he continues to have difficulty with his voice.  In the interim, I encouraged him to be aware of the importance of taking a breath before he speaks and actually letting the air flow.     I appreciate the opportunity to participate in the care of this pleasant patient.       Betty Mitchell MD

## 2019-11-04 NOTE — PROGRESS NOTES
Dear Colleague:    Cleveland Rodríguez recently returned for follow-up at the [facility]. My clinic note from our visit is enclosed below.  Speech recognition software may have been used in the documentation below; input is reviewed before signature to the best of my ability.     I appreciate the ongoing opportunity to participate in this patient's care.    Please feel free to contact me with any questions.      Sincerely yours,  Betty Mitchell M.D., M.P.H.  , Laryngology  Director, [facility]  Otolaryngology- Head & Neck Surgery  [phone]            =====  HISTORY OF PRESENT ILLNESS:  Cleveland Rodríguez is a pleasant 64-year-old male with recurrent respiratory papillomatosis (RRP), HPV6+, who returns in follow up today. He has a history of numerous operating room procedures for this.     Most recent OR procedure: 10/26/17.  Most recent procedure: awake KTP laryngoscopy 9/6/19     He feels his voice has not fully bounced back. At times it is very good, at other times it is terrible. He has not really had time to participate in voice therapy.      MEDICATIONS:     Current Outpatient Medications   Medication Sig Dispense Refill     aspirin 81 MG tablet Take 1 tablet by mouth daily        atorvastatin (LIPITOR) 20 MG tablet TAKE 1 TABLET BY MOUTH EVERY DAY 90 tablet 3     GARLIC OIL PO        KRILL OIL PO        metroNIDAZOLE (METROGEL) 1 % gel Apply 1 g topically daily apply hs for Rosacea 60 g 11     Multiple Vitamin (DAILY MULTIVITAMIN PO) Take  by mouth 2 times daily.       Omega-3 Fatty Acids (OMEGA-3 FISH OIL PO)          ALLERGIES:    Allergies   Allergen Reactions     Amoxicillin      Rash         NEW PMH/PSH: None    REVIEW OF SYSTEMS:  The patient completed a comprehensive 11 point review of systems (below), which was reviewed. Positives are as noted below.  Patient Supplied Answers to Review of Systems   ENT ROS 7/29/2019   Constitutional  -   Ears, Nose, Throat -   Cardiopulmonary -   Musculoskeletal -   Endocrine Frequent urination       PHYSICAL EXAM:  General: The patient was alert and conversant, and in no acute distress.    Oral cavity/oropharynx: No masses or lesions. Dentition unchanged since prior. Tongue mobility and palate elevation intact and symmetric.  Neck: No palpable cervical lymphadenopathy, no significant tenderness to palpation of the thyrohyoid space, which was narrow. No obvious thyroid abnormality.  Resp: Breathing comfortably, no stridor or stertor.  Neuro: Symmetric facial function. Other cranial nerve function as documented above.  Psych: Normal affect, pleasant and cooperative.  Voice/speech: Mild to moderate dysphonia characterized by breathiness, roughness, strain and inconsistency.      Intake scores  Last 2 Scores for Patient-Answered VHI Questionnaire  VHI Total Score 1/21/2019 7/29/2019   VHI Total Score 9 Incomplete      Last 2 Scores for Patient-Answered EAT Questionnaire  EAT Total Score 11/13/2017 10/19/2018   EAT Total Score 1 0      Last 2 Scores for Patient-Answered CSI Questionnaire  CSI Total Score 11/13/2017 10/19/2018   CSI Total Score 1 4       Procedure:   Flexible fiberoptic laryngoscopy and laryngovideostroboscopy  Indications: This procedure was warranted to evaluate the patient's laryngeal anatomy and function. Risks, benefits, and alternatives were discussed.  Description: After written informed consent was obtained, a time-out was performed to confirm patient identity, procedure, and procedure site. Topical 3% lidocaine with 0.25% phenylephrine was applied to the nasal cavities. I performed the endoscopy and no complications were apparent. Continuous and stroboscopic light were utilized to assess routine phonation and variable frequency phonation.  Performed by: Betty Mitchell MD MPH  SLP: NA  Findings: Normal nasopharynx. Normal base of tongue, valleculae, and epiglottis. Vocal fold mobility:  right: normal; left: normal. Medial edges of the vocal folds: smooth and straight. Scattered papilloma, mostly laterally, though left true vocal fold has broad presumed sessile papilloma without focal clusters. Glissade produced appropriate elongation. There was moderate supraglottic recruitment with connected speech. Mucosa of false vocal folds, aryepiglottic folds, piriform sinuses, and posterior glottis unremarkable. Stable anterior and posterior laryngeal webs. Airway was patent.        The addition of stroboscopy allowed evaluation of the mucosal wave.   Amplitude: right: normal; left: moderately decreased. Symmetry: associated asymmetry. Closure pattern: complete. Closure plane: at glottic level. Phase distribution: normal.           IMPRESSION AND PLAN:   Cleveland Rodríguez returns with dramatically reduced papilloma burden. His voice quality remains highly inconsistent.     We had a long discussion today about the role of speech therapy in helping him use his best voice.  Unfortunately, his work schedule does not allow for him to participate in the near future, but he may make an appointment a few months out if he continues to have difficulty with his voice.  In the interim, I encouraged him to be aware of the importance of taking a breath before he speaks and actually letting the air flow.     I appreciate the opportunity to participate in the care of this pleasant patient.

## 2020-06-04 ENCOUNTER — TELEPHONE (OUTPATIENT)
Dept: FAMILY MEDICINE | Facility: CLINIC | Age: 65
End: 2020-06-04

## 2020-06-04 NOTE — TELEPHONE ENCOUNTER
Spoke with patient and since Dr Amaro has changed roles patient is going to start going to the [facility] clinic right across the street from him

## 2020-06-04 NOTE — TELEPHONE ENCOUNTER
Reason for call:  Other   Patient called regarding (reason for call): call back  Additional comments: Patient would like an update as to Dr. Amaro's upcoming schedule. He is hoping to schedule a physical in July, but no schedule is available. Please advise.     Phone number to reach patient:  Home number on file [phone] (home)    Best Time:  Asap    Can we leave a detailed message on this number?  YES    Travel screening: Not Applicable

## 2020-06-04 NOTE — TELEPHONE ENCOUNTER
Team coordinators-Please inform patient Dr. Amaro recently changed roles within [facility] and is no longer seeing patients as a Primary Care Provider.  Patient is welcome to establish care with another clinic provider.    Thank you!  ANGELA MalikN, RN

## 2020-06-10 ENCOUNTER — HOSPITAL ENCOUNTER (EMERGENCY)
Facility: CLINIC | Age: 65
Discharge: HOME | End: 2020-06-10
Attending: PHYSICIAN ASSISTANT | Admitting: PHYSICIAN ASSISTANT
Payer: COMMERCIAL

## 2020-06-10 VITALS
RESPIRATION RATE: 16 BRPM | TEMPERATURE: 97.5 F | HEART RATE: 80 BPM | DIASTOLIC BLOOD PRESSURE: 78 MMHG | OXYGEN SATURATION: 98 % | SYSTOLIC BLOOD PRESSURE: 135 MMHG
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
## 2020-06-10 PROCEDURE — 90471 IMMUNIZATION ADMIN: CPT

## 2020-06-10 PROCEDURE — 27210282 ZZH ADHESIVE DERMABOND SKIN

## 2020-06-10 PROCEDURE — 90715 TDAP VACCINE 7 YRS/> IM: CPT | Performed by: PHYSICIAN ASSISTANT

## 2020-06-10 PROCEDURE — 12011 RPR F/E/E/N/L/M 2.5 CM/<: CPT

## 2020-06-10 PROCEDURE — 25000128 H RX IP 250 OP 636: Performed by: PHYSICIAN ASSISTANT

## 2020-06-10 PROCEDURE — 25000132 ZZH RX MED GY IP 250 OP 250 PS 637: Performed by: PHYSICIAN ASSISTANT

## 2020-06-10 PROCEDURE — 99283 EMERGENCY DEPT VISIT LOW MDM: CPT | Mod: 25

## 2020-06-10 RX ORDER — DOXYCYCLINE 100 MG/1
100 CAPSULE ORAL 2 TIMES DAILY
Qty: 5 CAPSULE | Refills: 0 | Status: SHIPPED | OUTPATIENT
Start: 2020-06-10

## 2020-06-10 RX ORDER — DOXYCYCLINE 100 MG/1
100 CAPSULE ORAL ONCE
Status: COMPLETED | OUTPATIENT
Start: 2020-06-10 | End: 2020-06-10

## 2020-06-10 RX ADMIN — DOXYCYCLINE 100 MG: 100 CAPSULE ORAL at 20:24

## 2020-06-10 RX ADMIN — CLOSTRIDIUM TETANI TOXOID ANTIGEN (FORMALDEHYDE INACTIVATED), CORYNEBACTERIUM DIPHTHERIAE TOXOID ANTIGEN (FORMALDEHYDE INACTIVATED), BORDETELLA PERTUSSIS TOXOID ANTIGEN (GLUTARALDEHYDE INACTIVATED), BORDETELLA PERTUSSIS FILAMENTOUS HEMAGGLUTININ ANTIGEN (FORMALDEHYDE INACTIVATED), BORDETELLA PERTUSSIS PERTACTIN ANTIGEN, AND BORDETELLA PERTUSSIS FIMBRIAE 2/3 ANTIGEN 0.5 ML: 5; 2; 2.5; 5; 3; 5 INJECTION, SUSPENSION INTRAMUSCULAR at 20:25

## 2020-06-10 ASSESSMENT — ENCOUNTER SYMPTOMS: WOUND: 1

## 2020-06-10 NOTE — ED AVS SNAPSHOT
Emergency Department  [address] [facility] [address]  Phone:  [phone]  Fax:  [phone]                                    Cleveland Rodríguez   MRN: [id number]    Department:   Emergency Department   Date of Visit:  6/10/2020           After Visit Summary Signature Page    I have received my discharge instructions, and my questions have been answered. I have discussed any challenges I see with this plan with the nurse or doctor.    ..........................................................................................................................................  Patient/Patient Representative Signature      ..........................................................................................................................................  Patient Representative Print Name and Relationship to Patient    ..................................................               ................................................  Date                                   Time    ..........................................................................................................................................  Reviewed by Signature/Title    ...................................................              ..............................................  Date                                               Time          22EPIC Rev 08/18

## 2020-06-11 NOTE — DISCHARGE INSTRUCTIONS
Your wounds are superficial here which is reassuring.  It is unclear if the wounds are from a scratch or a dog bite.  I suspect that the linear wounds on the front side of your nose are scratches.  There is a small portion on the left side of your nose h that it is unclear whether it is a bite or scratch.  Owever, we need to be extra cautious due to infection risk with dog bites.  We will place you on doxycycline.  Look for signs of infection which should be draining pus or fevers.  Follow-up with primary with additional concerns.  You can shower like normal at home but do not place soap or lotion on the nose as this will make the glue dissolve faster.  It will flake off by itself.  Do not pick at the wound.  Son will make things scar.  Wear a baseball cap until the wounds heal.  Then to use sunscreen as well over the areas to help prevent scarring the summer.

## 2020-06-11 NOTE — ED PROVIDER NOTES
History     Chief Complaint:  Dog Bite (BIT ON NOSE by pts dog)    The history is provided by the patient.      Cleveland Rodríguez is a 64 year old male with a history of hyperlipidemia who presents with a dog bite/scratch on his nose. This was his own dog, who had an eye infection, and when he got too close the dog bit/scratched him. He is unsure how exactly the dog injured him as it happened very quickly. He denies any nose bleeds or other scratches. The dog is UTD on vaccinations. Per chart review, his last tetanus was 2013.     Allergies:  Amoxicillin     Medications:    Aspirin 81 mg  Atorvastatin      Past Medical History:    Hyperlipidemia  Polyp of vocal cord or larynx  Sleep apnea  Uveitis   Rosacea   Carpal tunnel syndrome  Degeneration of lumbar or lumbosacral intervertebral disc  Iritis   Viral warts  Recurrent respiratory papillomatosis     Past Surgical History:    Cataract IOL  Laryngoscopy excise vocal cord lesion    Family History:    Father - heart disease    Social History:  The patient was unaccompanied to the ED.  Smoking Status: never  Smokeless Tobacco: never  Alcohol Use: yes  Drug Use: no  Marital Status:   [2]     Review of Systems   HENT: Negative for nosebleeds.    Skin: Positive for wound.   All other systems reviewed and are negative.    Physical Exam     Patient Vitals for the past 24 hrs:   BP Temp Temp src Pulse Resp SpO2   06/10/20 1847 135/78 97.5  F (36.4  C) Oral 80 16 98 %       Physical Exam  General: Well appearing, well nourished. Normal mood and affect.  Skin: Two 2.5 cm linear lacerations horizontally placed across the patient's nose.  Superficial in nature.  They are not gaping.  There is also a small less than half a centimeter bleeding laceration to the left lower nare.  No foreign bodies noted in these lacerations.  HEENT: Head: Normocephalic, atraumatic, no visible masses.   Eyes: Conjunctiva clear.  Ears: EACs clear, TMs translucent.   Nose: No external  lesions, mucosa non-inflamed, septum and turbinates normal.   Throat/pharynx: Mucous membranes moist, no mucosal lesions.   Cardiac: Normal rate and regular rhythm, no murmur or gallop.   Lungs: Clear to auscultation.  Musculoskeletal: Normal gait and station.   Neurologic: Oriented x 3. GCS: 15.  Psychiatric: Intact recent and remote memory, judgment and insight, normal mood and affect.     Emergency Department Course     Procedures    Laceration Repair        LACERATION:  Two simple minimally Contaminated 2.5 cm laceration.      LOCATION:  Nose      ANESTHESIA:  None      PREPARATION:  Irrigation with Normal Saline      DEBRIDEMENT:  no debridement      CLOSURE:  Wound was closed with Dermabond    Interventions:  2024 Vibramycin 100 mg PO  2025 Tdap 0.5 mL IM    Emergency Department Course:  Past medical records, nursing notes, and vitals reviewed.    1925 I performed an exam of the patient as documented above.     1930 I called the patient's wife with updates on the patient.     2005 I performed a laceration repair, as documented above.     Findings and plan explained to the Patient. Patient discharged home with instructions regarding supportive care, medications, and reasons to return. The importance of close follow-up was reviewed. The patient was prescribed Vibramycin.     I personally answered all related questions prior to discharge.     Impression & Plan     Medical Decision Making:  Cleveland Rodríguez is a 64 year old male who presents for evaluation of a suspected dog bite to the patient's nose.  The workup here in the ED shows no signs of compartment syndrome, significant lacerations, tendon or bone injury.  Wounds are superficial, no obvious foreign body.  I did not feel he warranted imaging studies to evaluate further for this.  Is also unclear if the dog scratch his nose.  I do have high suspicion for this given the linear along nature across his nose.  There is a small wound to the left lower nare  which may be a dog bite.  Regardless, as the patient is unclear as to this, we will be cautious and treat with antibiotics.  He does have an amoxicillin allergy.  First dose of doxycycline given here.  Tetanus was updated.  His dog is fully vaccinated, no rabies shots indicated here. The wounds were scrubbed and washed out with high pressure irrigation.  Will have them observe for signs of infection (pain, redness, warmth, red streaks, etc).  We discussed protecting it from the sun to prevent scarring.  As there is high suspicion for the linear wounds being scratches, and they are on the face, we did use a small amount of Dermabond to help close these and prevent scarring.  Wound on the left lower nare did not warrant any interventions for closure.  All questions were answered prior to the patient's discharge. He was in agreement with the plan stated above.     Diagnosis:    ICD-10-CM    1. Dog bite, initial encounter  W54.0XXA    2. Dog scratch  W54.8XXA        Disposition:  Discharged to home.    Discharge Medications:  Discharge Medication List as of 6/10/2020  8:18 PM      START taking these medications    Details   doxycycline hyclate (VIBRAMYCIN) 100 MG capsule Take 1 capsule (100 mg) by mouth 2 times daily, Disp-5 capsule,R-0, Local Print           This was created at least in part with a voice recognition software. Mistakes/typos may be present.     Scribe Disclosure:  I, Abida Marin, am serving as a scribe at 7:03 PM on 6/10/2020 to document services personally performed by Urmila Dupree PA based on my observations and the provider's statements to me.     6/10/2020    EMERGENCY DEPARTMENT       Urmila Dupree PA  06/10/20 [id number]

## 2020-06-19 ENCOUNTER — NURSE TRIAGE (OUTPATIENT)
Dept: NURSING | Facility: CLINIC | Age: 65
End: 2020-06-19

## 2020-06-19 NOTE — TELEPHONE ENCOUNTER
Triage call:     Patient calling about his lipitor prescription. States that he is establishing with a new primary since his MD is no longer seeing patients with primary care.     Disp  Refills  Start  End  JUAN     atorvastatin (LIPITOR) 20 MG tablet  90 tablet  3  10/3/2019   No    Sig: TAKE 1 TABLET BY MOUTH EVERY DAY    Sent to pharmacy as: Atorvastatin Calcium 20 MG Oral Tablet (LIPITOR)    Class: E-Prescribe    Notes to Pharmacy: DX Code Needed-Hyperlipidemia LDL goal <130 [E78.5]    Order: [id number]    E-Prescribing Status: Receipt confirmed by pharmacy (10/3/2019  7:30 AM CDT)      Advised that he should call his pharmacy but that he should still have refills on file for this medication. Patient reassured with this and declines additional questions. Advised if additional issues came up to have his pharmacy contact the clinic.     Mili Gillespie RN BSN 6/19/2020 2:31 PM    Additional Information    Negative: [1] Caller is not with the adult (patient) AND [2] reporting urgent symptoms    Negative: Lab result questions    Negative: Medication questions    Negative: Caller can't be reached by phone    Negative: Caller has already spoken to PCP or another triager    Negative: RN needs further essential information from caller in order to complete triage    Negative: Requesting regular office appointment    Negative: [1] Caller requesting NON-URGENT health information AND [2] PCP's office is the best resource    Health Information question, no triage required and triager able to answer question    Protocols used: INFORMATION ONLY CALL-A-

## 2020-09-03 ENCOUNTER — TELEPHONE (OUTPATIENT)
Dept: OTOLARYNGOLOGY | Facility: CLINIC | Age: 65
End: 2020-09-03

## 2020-09-03 NOTE — TELEPHONE ENCOUNTER
Left a  for pt regarding appt on 9/28 which needs to be rescheduled to a date and time to accomodate in clinic visit. Call back number provided on vm

## 2020-11-02 ENCOUNTER — OFFICE VISIT (OUTPATIENT)
Dept: OTOLARYNGOLOGY | Facility: CLINIC | Age: 65
End: 2020-11-02
Payer: COMMERCIAL

## 2020-11-02 VITALS
HEART RATE: 69 BPM | TEMPERATURE: 98.3 F | BODY MASS INDEX: 25.41 KG/M2 | OXYGEN SATURATION: 98 % | HEIGHT: 74 IN | WEIGHT: 198 LBS

## 2020-11-02 DIAGNOSIS — Z78.9 RECURRENT RESPIRATORY PAPILLOMATOSIS: Primary | ICD-10-CM

## 2020-11-02 DIAGNOSIS — R49.0 DYSPHONIA: ICD-10-CM

## 2020-11-02 PROCEDURE — 99214 OFFICE O/P EST MOD 30 MIN: CPT | Mod: 25 | Performed by: OTOLARYNGOLOGY

## 2020-11-02 PROCEDURE — 31579 LARYNGOSCOPY TELESCOPIC: CPT | Performed by: OTOLARYNGOLOGY

## 2020-11-02 ASSESSMENT — PAIN SCALES - GENERAL: PAINLEVEL: NO PAIN (0)

## 2020-11-02 ASSESSMENT — MIFFLIN-ST. JEOR: SCORE: 1742.87

## 2020-11-02 NOTE — PROGRESS NOTES
Dear Colleague:    Cleveland Rodríguez recently returned for follow-up at the [facility]. My clinic note from our visit is enclosed below.  Speech recognition software may have been used in the documentation below; input is reviewed before signature to the best of my ability.     I appreciate the ongoing opportunity to participate in this patient's care.    Please feel free to contact me with any questions.      Sincerely yours,  Betty Mitchell M.D., M.P.H.  , Laryngology  Director, [facility]  Otolaryngology- Head & Neck Surgery  [phone]            =====  HISTORY OF PRESENT ILLNESS:  Cleveland Rodríguez is a pleasant 65-year old male with with recurrent respiratory papillomatosis (RRP), HPV6+, who returns in follow up today. He has a history of numerous operating room procedures for this.     His most recent OR procedure was in 10/26/2017.  His most recent procedure was an awake KTP laryngoscopy in 09/06/2019.     He reports that his voice is still on and off, and this continues to be difficult to predict. He was not able to do voice therapy because of his occupational demands.    VHI-10 total today is 15.         MEDICATIONS:     Current Outpatient Medications   Medication Sig Dispense Refill     aspirin 81 MG tablet Take 1 tablet by mouth daily        atorvastatin (LIPITOR) 20 MG tablet TAKE 1 TABLET BY MOUTH EVERY DAY 90 tablet 3     doxycycline hyclate (VIBRAMYCIN) 100 MG capsule Take 1 capsule (100 mg) by mouth 2 times daily 5 capsule 0     GARLIC OIL PO        KRILL OIL PO        metroNIDAZOLE (METROGEL) 1 % gel Apply 1 g topically daily apply hs for Rosacea 60 g 11     Multiple Vitamin (DAILY MULTIVITAMIN PO) Take  by mouth 2 times daily.       Omega-3 Fatty Acids (OMEGA-3 FISH OIL PO)          ALLERGIES:    Allergies   Allergen Reactions     Amoxicillin      Rash         NEW PMH/PSH: None    REVIEW OF SYSTEMS:  The patient completed a  comprehensive 11 point review of systems (below), which was reviewed. Positives are as noted below.  Patient Supplied Answers to Review of Systems   ENT ROS 7/29/2019   Constitutional -   Ears, Nose, Throat -   Cardiopulmonary -   Musculoskeletal -   Endocrine Frequent urination       PHYSICAL EXAM:  General: The patient was alert and conversant, and in no acute distress.    Oral cavity/oropharynx: No masses or lesions. Dentition unchanged since prior. Tongue mobility and palate elevation intact and symmetric.  Neck: No palpable cervical lymphadenopathy, no  tenderness to palpation of the thyrohyoid space, which was narrow. No obvious thyroid abnormality.  Resp: Breathing comfortably, no stridor or stertor.  Neuro: Symmetric facial function. Other cranial nerve function as documented above.  Psych: Normal affect, pleasant and cooperative.  Voice/speech: Mild to moderate dysphonia characterized by breathiness, roughness and strain.      Intake scores  Last 2 Scores for Patient-Answered VHI Questionnaire  VHI Total Score 1/21/2019 7/29/2019   VHI Total Score 9 Incomplete      Last 2 Scores for Patient-Answered EAT Questionnaire  EAT Total Score 11/13/2017 10/19/2018   EAT Total Score 1 0        Last 2 Scores for Patient-Answered CSI Questionnaire  CSI Total Score 11/13/2017 10/19/2018   CSI Total Score 1 4           Procedure:   Flexible fiberoptic laryngoscopy and laryngovideostroboscopy  Indications: This procedure was warranted to evaluate the patient's laryngeal anatomy and function. Risks, benefits, and alternatives were discussed.  Description: After written informed consent was obtained, a time-out was performed to confirm patient identity, procedure, and procedure site. Topical 3% lidocaine with 0.25% phenylephrine was applied to the nasal cavities. I performed the endoscopy and no complications were apparent. Continuous and stroboscopic light were utilized to assess routine phonation and variable frequency  phonation.  Performed by: Betty Mitchell MD MPH  SLP: NA  Findings: Normal nasopharynx. Normal base of tongue, valleculae, and epiglottis. Vocal fold mobility: right: normal; left: normal. Medial edges of the vocal folds: right with straight mucosal edge but with posterior focus of papilloma and stable anterior glottic web. Left with fleshy broad papilloma along superior surface.   Glissade produced appropriate elongation. Mucosa of false vocal folds, aryepiglottic folds, piriform sinuses, and posterior glottis notable for papilloma along right false vocal fold/supraglottic as well as posterior glottic papilloma, bilaterally. Airway was patent.   Similar findings on NBI.    The addition of stroboscopy allowed evaluation of the mucosal wave.   Amplitude: right: moderately decreased; left: moderately decreased. Symmetry: intermittent symmetry. Closure pattern: complete. Closure plane: at glottic level. Phase distribution: normal.              IMPRESSION AND PLAN:   Cleveland Rodríguez returns with mild to moderate progression of papilloma compared to his prior exam one year ago. We discussed that potentially helpful options include voice therapy, awake KTP laser treatment, and Microdirect laryngoscopy with excision/ablation of papilloma with CO2 laser.     Although voice therapy can now occur virtually, he reports he does not have access to the necessary technology and does not foresee taking this on. He had a hard time tolerating the awake KTP laser treatment previously, and would prefer not to revisit this. He states that currently his voice is doing well enough that he would prefer to do interval follow-up rather than setting up a procedure in the operating room.    We also discussed that there is now some evidence in the literature potentially supporting the utility of HPV vaccination in adults who already have RRP. The data are preliminary and the included number of patients is small, but the vaccine appears  to be well tolerated and may be associated with longer intervals between procedures. He will inquire with his insurance as to whether an age exemption might be considered given that his RRP is highly likely to require additional operative intervention.    He would like to return in about six months (he prefers June 2021) for a follow up, or sooner as needed. I appreciate the opportunity to participate in the care of this pleasant patient.    Today's visit required additional screening time, PPE, and cleaning measures to allow for a safe in-person visit, due to the public health emergency.

## 2020-11-02 NOTE — LETTER
11/2/2020      RE: Cleveland Rodríguez  [address] [place] [address]       Dear Colleague:    Cleveland Rodríguez recently returned for follow-up at the [facility]. My clinic note from our visit is enclosed below.  Speech recognition software may have been used in the documentation below; input is reviewed before signature to the best of my ability.     I appreciate the ongoing opportunity to participate in this patient's care.    Please feel free to contact me with any questions.      Sincerely yours,  Betty Mitchell M.D., M.P.H.  , Laryngology  Director, [facility]  Otolaryngology- Head & Neck Surgery  [phone]            =====  HISTORY OF PRESENT ILLNESS:  Cleveland Rodríguez is a pleasant 65-year old male with with recurrent respiratory papillomatosis (RRP), HPV6+, who returns in follow up today. He has a history of numerous operating room procedures for this.     His most recent OR procedure was in 10/26/2017.  His most recent procedure was an awake KTP laryngoscopy in 09/06/2019.     He reports that his voice is still on and off, and this continues to be difficult to predict. He was not able to do voice therapy because of his occupational demands.    VHI-10 total today is 15.         MEDICATIONS:     Current Outpatient Medications   Medication Sig Dispense Refill     aspirin 81 MG tablet Take 1 tablet by mouth daily        atorvastatin (LIPITOR) 20 MG tablet TAKE 1 TABLET BY MOUTH EVERY DAY 90 tablet 3     doxycycline hyclate (VIBRAMYCIN) 100 MG capsule Take 1 capsule (100 mg) by mouth 2 times daily 5 capsule 0     GARLIC OIL PO        KRILL OIL PO        metroNIDAZOLE (METROGEL) 1 % gel Apply 1 g topically daily apply hs for Rosacea 60 g 11     Multiple Vitamin (DAILY MULTIVITAMIN PO) Take  by mouth 2 times daily.       Omega-3 Fatty Acids (OMEGA-3 FISH OIL PO)          ALLERGIES:    Allergies   Allergen Reactions     Amoxicillin       Rash         NEW PMH/PSH: None    REVIEW OF SYSTEMS:  The patient completed a comprehensive 11 point review of systems (below), which was reviewed. Positives are as noted below.  Patient Supplied Answers to Review of Systems   ENT ROS 7/29/2019   Constitutional -   Ears, Nose, Throat -   Cardiopulmonary -   Musculoskeletal -   Endocrine Frequent urination       PHYSICAL EXAM:  General: The patient was alert and conversant, and in no acute distress.    Oral cavity/oropharynx: No masses or lesions. Dentition unchanged since prior. Tongue mobility and palate elevation intact and symmetric.  Neck: No palpable cervical lymphadenopathy, no  tenderness to palpation of the thyrohyoid space, which was narrow. No obvious thyroid abnormality.  Resp: Breathing comfortably, no stridor or stertor.  Neuro: Symmetric facial function. Other cranial nerve function as documented above.  Psych: Normal affect, pleasant and cooperative.  Voice/speech: Mild to moderate dysphonia characterized by breathiness, roughness and strain.      Intake scores  Last 2 Scores for Patient-Answered VHI Questionnaire  VHI Total Score 1/21/2019 7/29/2019   VHI Total Score 9 Incomplete      Last 2 Scores for Patient-Answered EAT Questionnaire  EAT Total Score 11/13/2017 10/19/2018   EAT Total Score 1 0        Last 2 Scores for Patient-Answered CSI Questionnaire  CSI Total Score 11/13/2017 10/19/2018   CSI Total Score 1 4           Procedure:   Flexible fiberoptic laryngoscopy and laryngovideostroboscopy  Indications: This procedure was warranted to evaluate the patient's laryngeal anatomy and function. Risks, benefits, and alternatives were discussed.  Description: After written informed consent was obtained, a time-out was performed to confirm patient identity, procedure, and procedure site. Topical 3% lidocaine with 0.25% phenylephrine was applied to the nasal cavities. I performed the endoscopy and no complications were apparent. Continuous and  stroboscopic light were utilized to assess routine phonation and variable frequency phonation.  Performed by: Betty Mitchell MD MPH  SLP: NA  Findings: Normal nasopharynx. Normal base of tongue, valleculae, and epiglottis. Vocal fold mobility: right: normal; left: normal. Medial edges of the vocal folds: right with straight mucosal edge but with posterior focus of papilloma and stable anterior glottic web. Left with fleshy broad papilloma along superior surface.   Glissade produced appropriate elongation. Mucosa of false vocal folds, aryepiglottic folds, piriform sinuses, and posterior glottis notable for papilloma along right false vocal fold/supraglottic as well as posterior glottic papilloma, bilaterally. Airway was patent.   Similar findings on NBI.    The addition of stroboscopy allowed evaluation of the mucosal wave.   Amplitude: right: moderately decreased; left: moderately decreased. Symmetry: intermittent symmetry. Closure pattern: complete. Closure plane: at glottic level. Phase distribution: normal.              IMPRESSION AND PLAN:   Cleveland Rodríguez returns with mild to moderate progression of papilloma compared to his prior exam one year ago. We discussed that potentially helpful options include voice therapy, awake KTP laser treatment, and Microdirect laryngoscopy with excision/ablation of papilloma with CO2 laser.     Although voice therapy can now occur virtually, he reports he does not have access to the necessary technology and does not foresee taking this on. He had a hard time tolerating the awake KTP laser treatment previously, and would prefer not to revisit this. He states that currently his voice is doing well enough that he would prefer to do interval follow-up rather than setting up a procedure in the operating room.    We also discussed that there is now some evidence in the literature potentially supporting the utility of HPV vaccination in adults who already have RRP. The data are  preliminary and the included number of patients is small, but the vaccine appears to be well tolerated and may be associated with longer intervals between procedures. He will inquire with his insurance as to whether an age exemption might be considered given that his RRP is highly likely to require additional operative intervention.    He would like to return in about six months (he prefers June 2021) for a follow up, or sooner as needed. I appreciate the opportunity to participate in the care of this pleasant patient.    Today's visit required additional screening time, PPE, and cleaning measures to allow for a safe in-person visit, due to the public health emergency.        Betty Mitchell MD

## 2020-11-02 NOTE — NURSING NOTE
"Chief Complaint   Patient presents with     RECHECK     10 month follow up       Pulse 69, temperature 98.3  F (36.8  C), temperature source Temporal, height 1.88 m (6' 2\"), weight 89.8 kg (198 lb), SpO2 98 %.    Sri Pedersen, EMT  "

## 2020-12-08 ENCOUNTER — TRANSFERRED RECORDS (OUTPATIENT)
Dept: HEALTH INFORMATION MANAGEMENT | Facility: CLINIC | Age: 65
End: 2020-12-08

## 2021-01-20 ENCOUNTER — HOSPITAL ENCOUNTER (EMERGENCY)
Facility: CLINIC | Age: 66
Discharge: HOME | End: 2021-01-20
Attending: PHYSICIAN ASSISTANT | Admitting: PHYSICIAN ASSISTANT
Payer: COMMERCIAL

## 2021-01-20 ENCOUNTER — APPOINTMENT (OUTPATIENT)
Dept: GENERAL RADIOLOGY | Facility: CLINIC | Age: 66
End: 2021-01-20
Attending: PHYSICIAN ASSISTANT
Payer: COMMERCIAL

## 2021-01-20 VITALS
WEIGHT: 193 LBS | BODY MASS INDEX: 24.77 KG/M2 | HEIGHT: 74 IN | TEMPERATURE: 98 F | OXYGEN SATURATION: 98 % | RESPIRATION RATE: 18 BRPM | DIASTOLIC BLOOD PRESSURE: 88 MMHG | HEART RATE: 85 BPM | SYSTOLIC BLOOD PRESSURE: 108 MMHG

## 2021-01-20 DIAGNOSIS — R07.9 ACUTE CHEST PAIN: ICD-10-CM

## 2021-01-20 DIAGNOSIS — M79.10 MYALGIA: ICD-10-CM

## 2021-01-20 LAB
ALBUMIN SERPL-MCNC: 3.6 G/DL (ref 3.4–5)
ALBUMIN UR-MCNC: 10 MG/DL
ALP SERPL-CCNC: 94 U/L (ref 40–150)
ALT SERPL W P-5'-P-CCNC: 36 U/L (ref 0–70)
ANION GAP SERPL CALCULATED.3IONS-SCNC: 3 MMOL/L (ref 3–14)
APPEARANCE UR: CLEAR
AST SERPL W P-5'-P-CCNC: 37 U/L (ref 0–45)
BASOPHILS # BLD AUTO: 0 10E9/L (ref 0–0.2)
BASOPHILS NFR BLD AUTO: 0.3 %
BILIRUB SERPL-MCNC: 0.5 MG/DL (ref 0.2–1.3)
BILIRUB UR QL STRIP: NEGATIVE
BUN SERPL-MCNC: 24 MG/DL (ref 7–30)
CALCIUM SERPL-MCNC: 8.7 MG/DL (ref 8.5–10.1)
CHLORIDE SERPL-SCNC: 104 MMOL/L (ref 94–109)
CO2 SERPL-SCNC: 28 MMOL/L (ref 20–32)
COLOR UR AUTO: YELLOW
CREAT SERPL-MCNC: 1.03 MG/DL (ref 0.66–1.25)
D DIMER PPP FEU-MCNC: 0.5 UG/ML FEU (ref 0–0.5)
DIFFERENTIAL METHOD BLD: ABNORMAL
EOSINOPHIL # BLD AUTO: 0 10E9/L (ref 0–0.7)
EOSINOPHIL NFR BLD AUTO: 0.6 %
ERYTHROCYTE [DISTWIDTH] IN BLOOD BY AUTOMATED COUNT: 13.1 % (ref 10–15)
FLUAV RNA RESP QL NAA+PROBE: NEGATIVE
FLUBV RNA RESP QL NAA+PROBE: NEGATIVE
GFR SERPL CREATININE-BSD FRML MDRD: 75 ML/MIN/{1.73_M2}
GLUCOSE SERPL-MCNC: 93 MG/DL (ref 70–99)
GLUCOSE UR STRIP-MCNC: NEGATIVE MG/DL
HCT VFR BLD AUTO: 41.1 % (ref 40–53)
HGB BLD-MCNC: 13.7 G/DL (ref 13.3–17.7)
HGB UR QL STRIP: NEGATIVE
IMM GRANULOCYTES # BLD: 0 10E9/L (ref 0–0.4)
IMM GRANULOCYTES NFR BLD: 0.3 %
INTERPRETATION ECG - MUSE: NORMAL
KETONES UR STRIP-MCNC: 10 MG/DL
LABORATORY COMMENT REPORT: NORMAL
LACTATE BLD-SCNC: 0.6 MMOL/L (ref 0.7–2)
LEUKOCYTE ESTERASE UR QL STRIP: NEGATIVE
LIPASE SERPL-CCNC: 237 U/L (ref 73–393)
LYMPHOCYTES # BLD AUTO: 0.5 10E9/L (ref 0.8–5.3)
LYMPHOCYTES NFR BLD AUTO: 14.9 %
MCH RBC QN AUTO: 30.4 PG (ref 26.5–33)
MCHC RBC AUTO-ENTMCNC: 33.3 G/DL (ref 31.5–36.5)
MCV RBC AUTO: 91 FL (ref 78–100)
MONOCYTES # BLD AUTO: 0.4 10E9/L (ref 0–1.3)
MONOCYTES NFR BLD AUTO: 11.5 %
MUCOUS THREADS #/AREA URNS LPF: PRESENT /LPF
NEUTROPHILS # BLD AUTO: 2.3 10E9/L (ref 1.6–8.3)
NEUTROPHILS NFR BLD AUTO: 72.4 %
NITRATE UR QL: NEGATIVE
NRBC # BLD AUTO: 0 10*3/UL
NRBC BLD AUTO-RTO: 0 /100
PH UR STRIP: 5 PH (ref 5–7)
PLATELET # BLD AUTO: 142 10E9/L (ref 150–450)
POTASSIUM SERPL-SCNC: 4.7 MMOL/L (ref 3.4–5.3)
PROT SERPL-MCNC: 7.3 G/DL (ref 6.8–8.8)
RBC # BLD AUTO: 4.51 10E12/L (ref 4.4–5.9)
RBC #/AREA URNS AUTO: 1 /HPF (ref 0–2)
RSV RNA SPEC QL NAA+PROBE: NEGATIVE
SARS-COV-2 RNA RESP QL NAA+PROBE: NEGATIVE
SODIUM SERPL-SCNC: 135 MMOL/L (ref 133–144)
SOURCE: ABNORMAL
SP GR UR STRIP: 1.02 (ref 1–1.03)
SPECIMEN SOURCE: NORMAL
TROPONIN I SERPL-MCNC: <0.015 UG/L (ref 0–0.04)
TROPONIN I SERPL-MCNC: <0.015 UG/L (ref 0–0.04)
TSH SERPL DL<=0.005 MIU/L-ACNC: 1.74 MU/L (ref 0.4–4)
UROBILINOGEN UR STRIP-MCNC: 0 MG/DL (ref 0–2)
WBC # BLD AUTO: 3.2 10E9/L (ref 4–11)
WBC #/AREA URNS AUTO: <1 /HPF (ref 0–5)

## 2021-01-20 PROCEDURE — 96374 THER/PROPH/DIAG INJ IV PUSH: CPT

## 2021-01-20 PROCEDURE — 96361 HYDRATE IV INFUSION ADD-ON: CPT

## 2021-01-20 PROCEDURE — 71045 X-RAY EXAM CHEST 1 VIEW: CPT

## 2021-01-20 PROCEDURE — 258N000003 HC RX IP 258 OP 636: Performed by: PHYSICIAN ASSISTANT

## 2021-01-20 PROCEDURE — 87636 SARSCOV2 & INF A&B AMP PRB: CPT | Performed by: PHYSICIAN ASSISTANT

## 2021-01-20 PROCEDURE — 85379 FIBRIN DEGRADATION QUANT: CPT | Performed by: PHYSICIAN ASSISTANT

## 2021-01-20 PROCEDURE — 83690 ASSAY OF LIPASE: CPT | Performed by: PHYSICIAN ASSISTANT

## 2021-01-20 PROCEDURE — 93005 ELECTROCARDIOGRAM TRACING: CPT

## 2021-01-20 PROCEDURE — 84484 ASSAY OF TROPONIN QUANT: CPT | Performed by: PHYSICIAN ASSISTANT

## 2021-01-20 PROCEDURE — 80053 COMPREHEN METABOLIC PANEL: CPT | Performed by: PHYSICIAN ASSISTANT

## 2021-01-20 PROCEDURE — 87040 BLOOD CULTURE FOR BACTERIA: CPT | Performed by: PHYSICIAN ASSISTANT

## 2021-01-20 PROCEDURE — 85025 COMPLETE CBC W/AUTO DIFF WBC: CPT | Performed by: PHYSICIAN ASSISTANT

## 2021-01-20 PROCEDURE — 250N000013 HC RX MED GY IP 250 OP 250 PS 637: Performed by: PHYSICIAN ASSISTANT

## 2021-01-20 PROCEDURE — C9803 HOPD COVID-19 SPEC COLLECT: HCPCS

## 2021-01-20 PROCEDURE — 250N000011 HC RX IP 250 OP 636: Performed by: PHYSICIAN ASSISTANT

## 2021-01-20 PROCEDURE — 99285 EMERGENCY DEPT VISIT HI MDM: CPT | Mod: 25

## 2021-01-20 PROCEDURE — 81001 URINALYSIS AUTO W/SCOPE: CPT | Performed by: PHYSICIAN ASSISTANT

## 2021-01-20 PROCEDURE — 84443 ASSAY THYROID STIM HORMONE: CPT | Performed by: PHYSICIAN ASSISTANT

## 2021-01-20 PROCEDURE — 83605 ASSAY OF LACTIC ACID: CPT | Performed by: PHYSICIAN ASSISTANT

## 2021-01-20 RX ORDER — KETOROLAC TROMETHAMINE 15 MG/ML
15 INJECTION, SOLUTION INTRAMUSCULAR; INTRAVENOUS ONCE
Status: COMPLETED | OUTPATIENT
Start: 2021-01-20 | End: 2021-01-20

## 2021-01-20 RX ORDER — ASPIRIN 325 MG
325 TABLET ORAL ONCE
Status: COMPLETED | OUTPATIENT
Start: 2021-01-20 | End: 2021-01-20

## 2021-01-20 RX ADMIN — ASPIRIN 325 MG ORAL TABLET 325 MG: 325 PILL ORAL at 13:20

## 2021-01-20 RX ADMIN — SODIUM CHLORIDE 1000 ML: 9 INJECTION, SOLUTION INTRAVENOUS at 13:20

## 2021-01-20 RX ADMIN — KETOROLAC TROMETHAMINE 15 MG: 15 INJECTION, SOLUTION INTRAMUSCULAR; INTRAVENOUS at 13:20

## 2021-01-20 ASSESSMENT — ENCOUNTER SYMPTOMS
ABDOMINAL PAIN: 1
VOMITING: 0
NAUSEA: 0
DIARRHEA: 0
COUGH: 0
CHILLS: 0
SHORTNESS OF BREATH: 0

## 2021-01-20 ASSESSMENT — MIFFLIN-ST. JEOR: SCORE: 1720.19

## 2021-01-20 NOTE — DISCHARGE INSTRUCTIONS
Call your primary care doctor about follow up in the clinic in the next week and to discuss stress testing.   DO NOT go to work if you still have body aches.     Discharge Instructions  Chest Pain    You have been seen today for chest pain or discomfort.  At this time, your provider has found no signs that your chest pain is due to a serious or life-threatening condition, (or you have declined more testing and/or admission to the hospital). However, sometimes there is a serious problem that does not show up right away. Your evaluation today may not be complete and you may need further testing and evaluation.     Generally, every Emergency Department visit should have a follow-up clinic visit with either a primary or a specialty clinic/provider. Please follow-up as instructed by your emergency provider today.  Return to the Emergency Department if:  Your chest pain changes, gets worse, starts to happen more often, or comes with less activity.  You are newly short of breath.  You get very weak or tired.  You pass out or faint.  You have any new symptoms, like fever, cough, numb legs, or you cough up blood.  You have anything else that worries you.    Until you follow-up with your regular provider, please do the following:  Take one aspirin daily unless you have an allergy or are told not to by your provider.  If a stress test appointment has been made, go to the appointment.  If you have questions, contact your regular provider.  Follow-up with your regular provider/clinic as directed; this is very important.    If you were given a prescription for medicine here today, be sure to read all of the information (including the package insert) that comes with your prescription.  This will include important information about the medicine, its side effects, and any warnings that you need to know about.  The pharmacist who fills the prescription can provide more information and answer questions you may have about the medicine.   If you have questions or concerns that the pharmacist cannot address, please call or return to the Emergency Department.       Remember that you can always come back to the Emergency Department if you are not able to see your regular provider in the amount of time listed above, if you get any new symptoms, or if there is anything that worries you.

## 2021-01-20 NOTE — ED TRIAGE NOTES
"Pt presents with complaints of generalized weakness, body aches, headache and chest tightness since Sunday. Pt reports \"yovany never felt worse in my life\" Pt denies fever/chills/cough/sob  "

## 2021-01-20 NOTE — ED PROVIDER NOTES
History   Chief Complaint:  Chest Pain     HPI   Cleveland Rodríguez is a 65 year old male with history of hyperlipidemia and recurrent respiratory papillomas who presents with a headache and chest pain.  The patient reports that starting 4 days ago he developed generalized weakness, body aches, headache, and chest tightness.  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 Today, however, he reports intermittent stomachaches, and localized chest pain that worsens while leaning forward.  This chest pain does not radiate to anywhere else. No nausea, vomiting, diarrhea, shortness of breath, cough, or chills.  He does have a family history of heart disease, as his father had a heart attack at the age of 46.  No known Covid exposure.    CARDIAC RISK FACTORS:  Sex:    Male  Tobacco:   Negative  Hypertension:   Negative  Hyperlipidemia:  Positive  Diabetes:   Negative  Family History:  Positive    PE/DVT RISK FACTORS:  Sex:    Male  Hormones:   Negative  Tobacco:   Negative  Cancer:   Negative  Travel:   Negative  Surgery:   Negative  Other immobilization: Negative  Personal history:  Negative  Family history:  Negative    Review of Systems   Constitutional: Negative for chills.   Respiratory: Negative for cough and shortness of breath.    Cardiovascular: Positive for chest pain.   Gastrointestinal: Positive for abdominal pain. Negative for diarrhea, nausea and vomiting.   All other systems reviewed and are negative.    Allergies:  Amoxicillin    Medications:  Aspirin 81 mg  Lipitor    Past Medical History:    Sleep apnea  Hyperlipidemia  Rosacea  Carpal tunnel syndrome  Recurrent respiratory papillomas    Past Surgical History:    Cataract removal right eye  Laser vocal cord excision bilateral lesion of recurrent respiratory papilloma     Family History:    Heart disease (Father)    Social History:  Presents to ED from home  PCP: Sophia Erazo    Physical Exam  "    Patient Vitals for the past 24 hrs:   BP Temp Temp src Pulse Resp SpO2 Height Weight   01/20/21 1210 (!) 140/75 98  F (36.7  C) Oral 94 20 98 % 1.88 m (6' 2\") 87.5 kg (193 lb)       Physical Exam  General: Alert and interactive. Appears well. Cooperative and pleasant.   Eyes: The pupils are equal and round. EOMs intact. No scleral icterus.  ENT: No abnormalities to the external nose or ears. Slightly hoarse voice is chronic. Posterior oropharynx is non-erythematous.   Neck: Trachea is in the midline. No nuchal rigidity.     CV: Regular rate and rhythm. S1 and S2 normal without murmur, click, gallop or rub.   Resp: Breath sounds are clear bilaterally, without rhonchi, wheezes, rales. Non-labored, no retractions or accessory muscle use.     GI: Abdomen is soft without distension. No tenderness to palpation. No peritoneal signs.    MS: Moving all extremities well. Good muscle tone.   Skin: Warm and dry. No rash or lesions noted.  Neuro: Alert and oriented x 3. No focal neurologic deficits. Good strength and sensation in upper and lower extremities. Psych: Awake. Alert.  Normal affect. Appropriate interactions.  Lymph: No anterior or posterior cervical lymphadenopathy noted.    Emergency Department Course   ECG:  ECG taken at 1215, ECG read at 1306  Normal sinus rhythm  Incomplete right bundle branch block  Borderline ECG  Rate 100 bpm. WI interval 196 ms. QRS duration 102 ms. QT/QTc 350/451 ms. P-R-T axes 71 64 59.    Imaging:  XR Chest Port 1 View  No acute disease.  Reading per radiology     Laboratory:  CBC: WBC 3.2 (L), HGB 13.7,  (L)  CMP: WNL (Creatinine 1.03)    D dimer: 0.5  Lactic acid (Resulted 1330): 0.6 (L)  Troponin (Collected 1315): <0.015  Troponin (Collected 1540): <0.015   TSH: 1.74   Blood Culture x2: Pending   Lipase: 237    Symptomatic Influenza A/B & SARS-CoV2 Virus PCR: Negative     UA: Ketones 10 (A), Protein albumin 10 (A), Mucous present (A), o/w Negative     Emergency Department " Course:    Reviewed:  1229 I reviewed the patient's nursing notes, vitals, past medical records, Care Everywhere.     Assessments:  1240 I performed an exam of the patient as documented above.  1536 I rechecked the patient and reevaluated their symptoms.   1624 I rechecked the patient and reevaluated their symptoms. I updated him on his lab and imaging results.    Interventions:  1320 NS 1000 mL IV  1320 Toradol 15 mg IV  1320 aspirin 325 mg PO    Disposition:  The patient was discharged to home.     Impression & Plan   Medical Decision Making:  Cleveland Rodríguez is a 65 year old male who presents for evaluation of generalized weakness, body aches, headache, and chest tightness since Sunday. Symptoms are actually improving. He denies any fever/chills/cough or shortness of breath.  Very broad work-up was obtained, as noted above, which is reassuring and is negative. His urine shows no signs of infection and there is no leukocytosis. There are no signs of anemia, renal dysfunction, or other electrolyte abnormalities. His lactic is normal.     For chest pain,  I did consider ACS, PE, aortic dissection, pneumonia and many others. His chest x-ray is clear showing no signs of pneumonia or widened mediastinum to suggest aortic dissection. His clinical symptoms are inconsistent with dissection or AAA. Troponin is twice negative, and low for suspicion of ACS. His dimer is negative and essentially rules out pulmonary embolism. No EKG changes to suggest pericarditis, and no troponin elevation to suggest myocarditis.     Additionally, the patient's vitals are normal here without any signs of tachycardia or hypoxia. The patient was given an aspirin for his chest pain and Toradol to help with his myalgias. He was also given a liter of fluids for dehydration. His Covid swab ended up coming back negative, which did surprise me. I suggested that he stay home from work if he has any symptoms of Covid and to follow-up very closely  with his primary care physician, as I have no true explanation for myalgias.     At this point he is very reluctant to have a stress test, since he does not think this is his heart. He does have family history of heart disease, and I think this is indicated. He will follow up with primary care for stress echocardiography orders. Vitals are stable, and he is ready for discharge at this time.    Diagnosis:    ICD-10-CM    1. Myalgia  M79.10    2. Acute chest pain  R07.9        Scribe Disclosure:  I, Nicolas Mackay, am serving as a scribe at 12:29 PM on 1/20/2021 to document services personally performed by Michelle Madden PA-C based on my observations and the provider's statements to me.      Michelle Madden PA-C  01/20/21 2123

## 2021-01-26 LAB
BACTERIA SPEC CULT: NO GROWTH
BACTERIA SPEC CULT: NO GROWTH
SPECIMEN SOURCE: NORMAL
SPECIMEN SOURCE: NORMAL

## 2021-01-29 ENCOUNTER — HOSPITAL ENCOUNTER (EMERGENCY)
Facility: CLINIC | Age: 66
Discharge: HOME | End: 2021-01-29
Attending: EMERGENCY MEDICINE | Admitting: EMERGENCY MEDICINE
Payer: COMMERCIAL

## 2021-01-29 ENCOUNTER — APPOINTMENT (OUTPATIENT)
Dept: GENERAL RADIOLOGY | Facility: CLINIC | Age: 66
End: 2021-01-29
Attending: EMERGENCY MEDICINE
Payer: COMMERCIAL

## 2021-01-29 VITALS
BODY MASS INDEX: 24.77 KG/M2 | SYSTOLIC BLOOD PRESSURE: 124 MMHG | DIASTOLIC BLOOD PRESSURE: 76 MMHG | TEMPERATURE: 98.7 F | HEIGHT: 74 IN | HEART RATE: 77 BPM | OXYGEN SATURATION: 97 % | WEIGHT: 193 LBS | RESPIRATION RATE: 20 BRPM

## 2021-01-29 DIAGNOSIS — U07.1 2019 NOVEL CORONAVIRUS DISEASE (COVID-19): ICD-10-CM

## 2021-01-29 LAB
ALBUMIN SERPL-MCNC: 2.9 G/DL (ref 3.4–5)
ALBUMIN UR-MCNC: 30 MG/DL
ALP SERPL-CCNC: 84 U/L (ref 40–150)
ALT SERPL W P-5'-P-CCNC: 37 U/L (ref 0–70)
ANION GAP SERPL CALCULATED.3IONS-SCNC: 7 MMOL/L (ref 3–14)
APPEARANCE UR: CLEAR
AST SERPL W P-5'-P-CCNC: 47 U/L (ref 0–45)
BASOPHILS # BLD AUTO: 0 10E9/L (ref 0–0.2)
BASOPHILS NFR BLD AUTO: 0.4 %
BILIRUB SERPL-MCNC: 0.3 MG/DL (ref 0.2–1.3)
BILIRUB UR QL STRIP: NEGATIVE
BUN SERPL-MCNC: 25 MG/DL (ref 7–30)
CALCIUM SERPL-MCNC: 8.5 MG/DL (ref 8.5–10.1)
CHLORIDE SERPL-SCNC: 107 MMOL/L (ref 94–109)
CO2 SERPL-SCNC: 26 MMOL/L (ref 20–32)
COLOR UR AUTO: YELLOW
CREAT SERPL-MCNC: 1.08 MG/DL (ref 0.66–1.25)
DIFFERENTIAL METHOD BLD: ABNORMAL
EOSINOPHIL # BLD AUTO: 0 10E9/L (ref 0–0.7)
EOSINOPHIL NFR BLD AUTO: 0 %
ERYTHROCYTE [DISTWIDTH] IN BLOOD BY AUTOMATED COUNT: 12.6 % (ref 10–15)
FLUAV RNA RESP QL NAA+PROBE: NEGATIVE
FLUBV RNA RESP QL NAA+PROBE: NEGATIVE
GFR SERPL CREATININE-BSD FRML MDRD: 70 ML/MIN/{1.73_M2}
GLUCOSE SERPL-MCNC: 104 MG/DL (ref 70–99)
GLUCOSE UR STRIP-MCNC: NEGATIVE MG/DL
HCT VFR BLD AUTO: 39.8 % (ref 40–53)
HGB BLD-MCNC: 13.1 G/DL (ref 13.3–17.7)
HGB UR QL STRIP: NEGATIVE
HYALINE CASTS #/AREA URNS LPF: 1 /LPF (ref 0–2)
IMM GRANULOCYTES # BLD: 0 10E9/L (ref 0–0.4)
IMM GRANULOCYTES NFR BLD: 0.4 %
KETONES UR STRIP-MCNC: 10 MG/DL
LABORATORY COMMENT REPORT: ABNORMAL
LACTATE BLD-SCNC: 0.9 MMOL/L (ref 0.7–2)
LEUKOCYTE ESTERASE UR QL STRIP: NEGATIVE
LIPASE SERPL-CCNC: 242 U/L (ref 73–393)
LYMPHOCYTES # BLD AUTO: 0.4 10E9/L (ref 0.8–5.3)
LYMPHOCYTES NFR BLD AUTO: 14.2 %
MCH RBC QN AUTO: 29.4 PG (ref 26.5–33)
MCHC RBC AUTO-ENTMCNC: 32.9 G/DL (ref 31.5–36.5)
MCV RBC AUTO: 89 FL (ref 78–100)
MONOCYTES # BLD AUTO: 0.2 10E9/L (ref 0–1.3)
MONOCYTES NFR BLD AUTO: 8.4 %
MUCOUS THREADS #/AREA URNS LPF: PRESENT /LPF
NEUTROPHILS # BLD AUTO: 2 10E9/L (ref 1.6–8.3)
NEUTROPHILS NFR BLD AUTO: 76.6 %
NITRATE UR QL: NEGATIVE
NRBC # BLD AUTO: 0 10*3/UL
NRBC BLD AUTO-RTO: 0 /100
PH UR STRIP: 6 PH (ref 5–7)
PLATELET # BLD AUTO: 159 10E9/L (ref 150–450)
POTASSIUM SERPL-SCNC: 3.6 MMOL/L (ref 3.4–5.3)
PROT SERPL-MCNC: 7.1 G/DL (ref 6.8–8.8)
RBC # BLD AUTO: 4.46 10E12/L (ref 4.4–5.9)
RBC #/AREA URNS AUTO: 8 /HPF (ref 0–2)
RSV RNA SPEC QL NAA+PROBE: ABNORMAL
SARS-COV-2 RNA RESP QL NAA+PROBE: POSITIVE
SODIUM SERPL-SCNC: 140 MMOL/L (ref 133–144)
SOURCE: ABNORMAL
SP GR UR STRIP: 1.01 (ref 1–1.03)
SPECIMEN SOURCE: ABNORMAL
TSH SERPL DL<=0.005 MIU/L-ACNC: 1.27 MU/L (ref 0.4–4)
UROBILINOGEN UR STRIP-MCNC: 0 MG/DL (ref 0–2)
WBC # BLD AUTO: 2.6 10E9/L (ref 4–11)
WBC #/AREA URNS AUTO: 3 /HPF (ref 0–5)

## 2021-01-29 PROCEDURE — 80053 COMPREHEN METABOLIC PANEL: CPT | Performed by: EMERGENCY MEDICINE

## 2021-01-29 PROCEDURE — 71045 X-RAY EXAM CHEST 1 VIEW: CPT

## 2021-01-29 PROCEDURE — 87636 SARSCOV2 & INF A&B AMP PRB: CPT | Performed by: EMERGENCY MEDICINE

## 2021-01-29 PROCEDURE — 99284 EMERGENCY DEPT VISIT MOD MDM: CPT | Mod: 25

## 2021-01-29 PROCEDURE — 96361 HYDRATE IV INFUSION ADD-ON: CPT

## 2021-01-29 PROCEDURE — 83605 ASSAY OF LACTIC ACID: CPT | Performed by: EMERGENCY MEDICINE

## 2021-01-29 PROCEDURE — 258N000003 HC RX IP 258 OP 636: Performed by: EMERGENCY MEDICINE

## 2021-01-29 PROCEDURE — 85025 COMPLETE CBC W/AUTO DIFF WBC: CPT | Performed by: EMERGENCY MEDICINE

## 2021-01-29 PROCEDURE — 83690 ASSAY OF LIPASE: CPT | Performed by: EMERGENCY MEDICINE

## 2021-01-29 PROCEDURE — C9803 HOPD COVID-19 SPEC COLLECT: HCPCS

## 2021-01-29 PROCEDURE — 96360 HYDRATION IV INFUSION INIT: CPT

## 2021-01-29 PROCEDURE — 84443 ASSAY THYROID STIM HORMONE: CPT | Performed by: EMERGENCY MEDICINE

## 2021-01-29 PROCEDURE — 81001 URINALYSIS AUTO W/SCOPE: CPT | Performed by: EMERGENCY MEDICINE

## 2021-01-29 RX ADMIN — SODIUM CHLORIDE 1000 ML: 9 INJECTION, SOLUTION INTRAVENOUS at 10:28

## 2021-01-29 ASSESSMENT — ENCOUNTER SYMPTOMS
APPETITE CHANGE: 1
DYSPHORIC MOOD: 1
FEVER: 0
SHORTNESS OF BREATH: 0
HEADACHES: 1
ABDOMINAL PAIN: 0
FATIGUE: 1
VOMITING: 0
CONFUSION: 1

## 2021-01-29 ASSESSMENT — MIFFLIN-ST. JEOR: SCORE: 1720.19

## 2021-01-29 NOTE — ED PROVIDER NOTES
History   Chief Complaint:  Cough, Headache, and Abdominal Pain       The history is provided by the patient and the spouse.      Cleveland Rodríguez is a 65 year old male with history of recurrent respiratory papillomas who presents with  headache. The patient was seen here on 1/20/20 for chest pain and headache as well, see work-up below. At that time he was seen for a mild headache for 5 days, cotton mouth, 1-2 episodes of diarrhea for the past week, abdominal pain, confused speech, and depression. They suspected dehydration and recommended scheduling a stress test. He has had a cough or the last 4 days.    Today, his wife reports new symptoms such as no appetite or drinking fluids, increased fatigue, and frustration with confusion. He just started on medication for dementia. He has been complaining of a headache but she is afraid to give him Tylenol due to no food in his stomach. The patient states that he has been taking his statin medications. His wife called the clinic this morning for an appointment with Dr. Good. He denies fever, vomiting, abdominal pain, shortness of breath, or chest pain. He was tested for Covid on 1/20, which was negative, and denies recent exposure.     Work-up from 1/20/20:  Imaging:  XR Chest Port 1 View  No acute disease.  Reading per radiology      Laboratory:  CBC: WBC 3.2 (L), HGB 13.7,  (L)  CMP: WNL (Creatinine 1.03)     D dimer: 0.5  Lactic acid (Resulted 1330): 0.6 (L)  Troponin (Collected 1315): <0.015  Troponin (Collected 1540): <0.015   TSH: 1.74   Blood Culture x2: Pending   Lipase: 237     Symptomatic Influenza A/B & SARS-CoV2 Virus PCR: Negative      UA: Ketones 10 (A), Protein albumin 10 (A), Mucous present (A), o/w Negative      Review of Systems   Constitutional: Positive for appetite change and fatigue. Negative for fever.   Respiratory: Negative for shortness of breath.    Cardiovascular: Negative for chest pain.   Gastrointestinal: Negative for  "abdominal pain and vomiting.   Neurological: Positive for headaches.   Psychiatric/Behavioral: Positive for confusion and dysphoric mood.   All other systems reviewed and are negative.      Allergies:  Amoxicillin    Medications:  Lipitor  Aspirin 81 mg    Past Medical History:    Hyperlipidemia  Polyp of vocal cord or larynx  Uveitis  Recurrent respiratory papillomatosis  Iritis   Carpal tunnel syndrome  Degenerative disc     Past Surgical History:    Cataract IOL  Colonoscopy  Laser CO2 laryngoscopy  Laser KTP laryngoscopy  Vocal cord surgeries x55    Family History:    Father: Heart disease  Mother: Dementia    Social History:  Presents to the ED alone.  Patient is .    Physical Exam     Patient Vitals for the past 24 hrs:   BP Temp Temp src Pulse Resp SpO2 Height Weight   01/29/21 1200 124/76 -- -- 77 -- 97 % -- --   01/29/21 1100 113/77 -- -- 77 -- 98 % -- --   01/29/21 1030 104/69 -- -- 81 -- 94 % -- --   01/29/21 1000 110/69 -- -- 86 -- 94 % -- --   01/29/21 0929 107/72 98.7  F (37.1  C) Oral 85 20 93 % 1.88 m (6' 2\") 87.5 kg (193 lb)       Physical Exam    Physical Exam   Constitutional:  Cognitive impairment. They appear well-developed and well-nourished. Mild distress secondary to frustration.    HENT:   Mouth/Throat:   Oropharynx is clear and moist.   Eyes:    Conjunctivae normal and EOM are normal. Pupils are equal, round, and reactive to light.   Neck:    Normal range of motion.   Cardiovascular: Normal rate, regular rhythm and normal heart sounds.  Exam reveals no gallop and no friction rub.  No murmur heard.  Pulmonary/Chest:  Effort normal and breath sounds normal. Patient has no wheezes. Patient has no rales.   Abdominal:   Soft. Bowel sounds are normal. Patient exhibits no mass. There is no tenderness. There is no rebound and no guarding.   Musculoskeletal:  Normal range of motion. Patient exhibits no edema.   Neurological:   Cognitive impairment. Patient is generally weak. No cranial nerve " deficit or sensory deficit. GCS 15  Skin:   Skin is warm and dry. No rash noted. No erythema.   Psychiatric:   Patient has a normal mood.    Emergency Department Course     Imaging:  XR Chest, Portable, G/E 1 view:   Heterogeneous pulmonary opacities predominantly in the  lower lungs are new from the comparison study and are concerning for  airspace disease.  COVID-19 pneumonia could have this appearance. No  pneumothorax. Normal size of the heart. As per radiology.    Laboratory:   CBC: WBC: 2.6 (L), HGB: 13.1 (L), PLT: 159    CMP: Glucose 104 (H), Albumin: 2.9 (L), AST: 47 (H), o/w WNL (Creatinine: 1.08)    Lactic acid (1039): 0.9    Lipase: 242    TSH with Free T4 Reflex: 1.27    UA with Microscopic: Ketones: 10 (A), Protein Albumin: 30 (A), RBC: 8 (H), Mucous: Present (A),  o/w Negative    Symptomatic Influenza A/B antigen & COVID-19 PCR: Covid: Positive (A), o/w Negative    Emergency Department Course:    Reviewed:  I reviewed the patient's nursing notes, vitals, past medical records, Care Everywhere.     Assessments:  1012 Initial examination of the patient.     1246 Updated the patient and his wife.     Interventions:  1028 NS 1L IV    Disposition:  The patient was discharged to home.     Impression & Plan     Covid-19  Cleveland Rodríguez was evaluated during a global COVID-19 pandemic, which necessitated consideration that the patient might be at risk for infection with the SARS-CoV-2 virus that causes COVID-19.   Applicable protocols for evaluation were followed during the patient's care.   COVID-19 was considered as part of the patient's evaluation. The plan for testing is:  a test was obtained during this visit.    Medical Decision Making:  Cleveland Rodríguez is a 65 year old male who presented to the Emergency Department with persistent symptoms from his evaluation here 10 days ago. He did not have any chest pain. He has had a cough over the last 3-4 days. I did re-swab him for Covid and he is positive  today which is really consistent with his symptoms. This also goes along with is mild leukopenia with a white blood cell count of 2.6. There is no left shift. He has no acute metabolic derangement. His lactic acid is noted to be normal. Findings on his chest x-ray are consistent with Covid-19. I doubt bacterial pneumonia being that he has no fever and his white count favors more viral infection. At this time I did review with him and his wife Covid isolation. Protocol and quarantine protocol were discussed with the wife and she will get tested as an outpatient as she is asymptomatic. He should return if he develops any shortness of breath, worsening cough, or high fever.     Diagnosis:    ICD-10-CM    1. 2019 novel coronavirus disease (COVID-19)  U07.1 UA with Microscopic       Scribe Disclosure:  I, Tyler Burt, am serving as a scribe at 9:31 AM on 1/29/2021 to document services personally performed by Sindhu Duff MD based on my observations and the provider's statements to me.          Sindhu Duff MD  01/29/21 [id number]

## 2021-01-29 NOTE — ED NOTES
Report received. Patient visualized. Vital signs stable. Patients urine sample collected and sent. MD speaking to patient regarding Xray results. Will continue to monitor.

## 2021-01-29 NOTE — ED TRIAGE NOTES
Stated 2 weeks ago.  Has SOB stomach ache headache and not feeling well.  Not eating  and drinking much.

## 2021-03-28 ENCOUNTER — APPOINTMENT (OUTPATIENT)
Dept: CT IMAGING | Facility: CLINIC | Age: 66
End: 2021-03-28
Attending: NURSE PRACTITIONER
Payer: COMMERCIAL

## 2021-03-28 ENCOUNTER — HOSPITAL ENCOUNTER (EMERGENCY)
Facility: CLINIC | Age: 66
Discharge: HOME | End: 2021-03-28
Attending: NURSE PRACTITIONER | Admitting: NURSE PRACTITIONER
Payer: COMMERCIAL

## 2021-03-28 VITALS
TEMPERATURE: 97 F | SYSTOLIC BLOOD PRESSURE: 102 MMHG | RESPIRATION RATE: 18 BRPM | DIASTOLIC BLOOD PRESSURE: 67 MMHG | OXYGEN SATURATION: 98 % | HEART RATE: 83 BPM
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
## 2021-03-28 DIAGNOSIS — S09.90XA CLOSED HEAD INJURY, INITIAL ENCOUNTER: ICD-10-CM

## 2021-03-28 DIAGNOSIS — S01.81XA FACIAL LACERATION, INITIAL ENCOUNTER: ICD-10-CM

## 2021-03-28 DIAGNOSIS — S00.83XA FACIAL CONTUSION, INITIAL ENCOUNTER: ICD-10-CM

## 2021-03-28 PROCEDURE — 99284 EMERGENCY DEPT VISIT MOD MDM: CPT | Mod: 25

## 2021-03-28 PROCEDURE — 12013 RPR F/E/E/N/L/M 2.6-5.0 CM: CPT

## 2021-03-28 PROCEDURE — 250N000013 HC RX MED GY IP 250 OP 250 PS 637: Performed by: NURSE PRACTITIONER

## 2021-03-28 PROCEDURE — 271N000002 HC RX 271

## 2021-03-28 PROCEDURE — 70450 CT HEAD/BRAIN W/O DYE: CPT

## 2021-03-28 PROCEDURE — 250N000009 HC RX 250

## 2021-03-28 RX ORDER — ACETAMINOPHEN 500 MG
1000 TABLET ORAL ONCE
Status: COMPLETED | OUTPATIENT
Start: 2021-03-28 | End: 2021-03-28

## 2021-03-28 RX ORDER — METHYLCELLULOSE 4000CPS 30 %
POWDER (GRAM) MISCELLANEOUS ONCE
Status: DISCONTINUED | OUTPATIENT
Start: 2021-03-28 | End: 2021-03-28 | Stop reason: HOSPADM

## 2021-03-28 RX ADMIN — ACETAMINOPHEN 1000 MG: 500 TABLET ORAL at 16:53

## 2021-03-28 ASSESSMENT — ENCOUNTER SYMPTOMS
DIZZINESS: 0
LIGHT-HEADEDNESS: 0
NAUSEA: 0
HEADACHES: 0
WOUND: 1

## 2021-03-28 NOTE — ED PROVIDER NOTES
History   Chief Complaint:  Laceration     HPI   Cleveland Rodríguez is a 65 year old male who presents with a head laceration. Patient states he was jogging across the street when he tripped and fell forward and hit his head on the ground. He sustained a laceration over his right eye during the fall. No loss of consciousness. He was able to get up and ambulate immediately after he fell. Denies nausea, headache, chest pain, dizziness, light-headedness, or vision changes.  He is not anticoagulated.  Last tetanus 2020.    Review of Systems   Gastrointestinal: Negative for nausea.   Skin: Positive for wound.   Neurological: Negative for dizziness, light-headedness and headaches.     Allergies:  Amoxicillin    Medications:  Lipitor  Aspirin 81 mg    Past Medical History:    Rosacea  Sleep apnea  Hyperlipidemia   Uveitis    Past Surgical History:    55 vocal cord surgeries    Family History:    Father- heart disease    Social History:  Presents with his wife      Physical Exam     Patient Vitals for the past 24 hrs:   BP Temp Temp src Pulse Resp SpO2   03/28/21 1550 102/67 97  F (36.1  C) Temporal 83 18 98 %       Physical Exam  General: Alert. Well kept.  HEENT:   Head: No facial asymmetry. No palpable scalp hematomas or bony step offs but does have a hematoma to the right forehead. No frontal or maxillary facial tenderness.   Eyes: Normal conjunctiva. No scleral icterus. PERRLA. EOMI. No raccoon s eyes.  No diplopia with upward gaze.  Ears:  No hemotympanum bilaterally. No Draper's signs.   Nose: No deformity. No nasal drainage.   Throat: Moist mucous membranes. No evidence for intraoral trauma.   Neck: No midline tenderness over cervical spine or paraspinal musculature. Normal range of motion.   Cardiac: Normal rate and regular rhythm. Normal heart sounds. No murmurs, rubs, or gallops appreciated.   Pulmonary: CTA bilaterally. Normal breath sounds. No wheezing, crackles, or rhonchi appreciated.   Abdomen:  Soft, non-tender, non-distended. No rebound or guarding.   Neuro: GCS 15. Alert and oriented. Cranial nerves II-XII intact. 5/5 strength equal bilateral upper and lower extremities. Gait smooth.Visual fields bilateral without deficit.  MUSCULOSKELETAL: Normal gross range of motion of all 4 extremities. No midline tenderness over thoracic, lumbar or sacral spine.   Upper extremities: 5/5 symmetric strength and ROM with shoulder abduction and adduction, elbow flexion and extension, dorsi- and palmar-flexion, , compartments soft.   Lower extremities: 5/5 symmetric strength and ROM with dorsi- and plantar-flexion, knee flexion and extension, hip flexion, hip internal and external rotation, compartments soft.   SKIN: Skin is warm and dry. No rashes, petechiae or pallor.  1.5 cm superficial laceration to mid forehead.  E shaped 2 cm laceration superior to right lateral eyebrow.  PSYCH: Normal affect and mood. Good eye contact.      Emergency Department Course     Imaging:  Head CT w/o Contrast  1. No evidence of acute intracranial hemorrhage, mass, or herniation.   2. Right frontal scalp soft tissue injury without underlying calvarial   fracture. Facial bones are not entirely included on these images.   As read by Radiology.      Procedures    Laceration Repair        LACERATION:  A simple and superficial clean 1.5 cm laceration.      LOCATION:  Mid-forehead      FUNCTION:  Distally sensation, circulation function are intact.      ANESTHESIA:  LET - Topical      PREPARATION:  Irrigation with Normal Saline and Shur Clens      DEBRIDEMENT:  no debridement and wound explored, no foreign body found      CLOSURE:  Wound was closed with One Layer.  Skin closed with 2 x 5.0 Ethylon using interrupted sutures.       Laceration Repair        LACERATION:  A complex and superficial clean e-shaped 2 cm laceration.      LOCATION:  Right eyebrow      FUNCTION:  Distally sensation, circulation function are intact.      ANESTHESIA:   "LET - Topical      PREPARATION:  Irrigation with Normal Saline and Shur Clens      DEBRIDEMENT:  no debridement and wound explored, no foreign body found      CLOSURE:  Wound was closed with One Layer.  Skin closed with 6 x 5.0 Ethylon using interrupted sutures.     Emergency Department Course:    Reviewed:  I reviewed nursing notes, vitals, past medical history and care everywhere    Assessments:  1628 I obtained history and examined the patient as noted above.   1745 I rechecked the patient and explained findings.     Interventions:  1653: Tylenol 1000 mg PO     Disposition:  The patient was discharged to home.     Impression & Plan   Medical Decision Making:  Cleveland Rodríguez is a 65 year old male who presents with a head laceration and head injury. The differential diagnosis includes skull fracture, epidural hematoma, subdural hematoma, intracerebral hemorrhage, and traumatic subarachnoid hemorrhage.  There are no physical signs of skull fracture or other bony fracture on exam and the patient is well-appearing, but using [place] head CT guidelines, a CT of the head was indicated.  Fortunately, no signs of intracerebral bleed or skull fracture were detected during this visit on CT imaging.  No signs of midface fracture on trauma examination.  Despite the normal neuroimaging,  the patient/family understands that they must return if any \"red flags\" appear/develop in the coming hours/days, as this may represent an indication to perform another CT scan.  I have noted that \"red flags\" include: lethargy or irritability,  strange behavior, seizures, repeated vomiting, weakness or loss of responsiveness. Although rare, delayed CNS bleeds can occur, and this was discussed with the patient and his spouse.   The wounds were carefully evaluated and explored. The lacerations were closed with interrupted sutures as noted above. There is no evidence of muscular, tendon, or bony damage with this laceration. No signs of " foreign body. Possible complications (infection, scarring) were reviewed with the patient.  Tetanus is up-to-date follow up with primary care will be indicated as noted in the discharge section.   Diagnosis:    ICD-10-CM    1. Fall, initial encounter  W19.XXXA    2. Facial contusion, initial encounter  S00.83XA    3. Closed head injury, initial encounter  S09.90XA    4. Facial laceration, initial encounter  S01.81XA        Discharge Medications:  New Prescriptions    No medications on file       Scribe Disclosure:  I, Francisca Jang, am serving as a scribe at 4:17 PM on 3/28/2021 to document services personally performed by Tamera Sanders, KARAN based on my observations and the provider's statements to me.             Tamera Sanders, CNP  03/29/21 0058

## 2021-03-28 NOTE — DISCHARGE INSTRUCTIONS
Discharge Instructions  Laceration (Cut)    You were seen today for a laceration (cut).  Your provider examined your laceration for any problems such a buried foreign body (like glass, a splinter, or gravel), or injury to blood vessels, tendons, and nerves.  Your provider may have also rinsed and/or scrubbed your laceration to help prevent an infection. It may not be possible to find all problems with your laceration on the first visit; occasionally foreign bodies or a tendon injury can go undetected.    Your laceration may have been closed in one of several ways:  No closure: many wounds will heal just fine without closure.  Stitches: regular stitches that require removal.  Staples: skin staples are often used in the scalp/head.  Wound adhesive (glue): skin glue can be used for certain lacerations and doesn t require removal.  Wound strips (aka Butterfly bandages or steri-strips): these are bandages that help to close a wound.  Absorbable stitches:  dissolving  stitches that go away on their own and usually don t require removal.    A small percentage of wounds will develop an infection regardless of how well the wound is cared for. Antibiotics are generally not indicated to prevent an infection so are only given for a small number of high-risk wounds. Some lacerations are too high risk to close, and are left open to heal because closure can increase the likelihood that an infection will develop.    Remember that all lacerations, no matter how expertly repaired, will cause scarring. We consider many factors, techniques, and materials, in our efforts to provide the best possible cosmetic outcome.    Generally, every Emergency Department visit should have a follow-up clinic visit with either a primary or a specialty clinic/provider. Please follow-up as instructed by your emergency provider today.     Return to the Emergency Department right away if:  You have more redness, swelling, pain, drainage (pus), a bad smell,  or red streaking from your laceration as these symptoms could indicate an infection.  You have a fever of 100.4 F or more.  You have bleeding that you cannot stop at home. If your cut starts to bleed, hold pressure on the bleeding area with a clean cloth or put pressure over the bandage.  If the bleeding does not stop after using constant pressure for 30 minutes, you should return to the Emergency Department for further treatment.  An area past the laceration is cool, pale, or blue compared with the other side, or has a slower return of color when squeezed.  Your dressing seems too tight or starts to get uncomfortable or painful. For children, signs of a problem might be irritability or restlessness.  You have loss of normal function or use of an area, such as being unable to straighten or bend a finger normally.  You have a numb area past the laceration.    Return to the Emergency Department or see your regular provider if:  The laceration starts to come open.   You have something coming out of the cut or a feeling that there is something in the laceration.  Your wound will not heal, or keeps breaking open. There can always be glass, wood, dirt or other things in any wound.  They will not always show up, even on x-rays.  If a wound does not heal, this may be why, and it is important to follow-up with your regular provider.    Home Care:  Take your dressing off in 12-24 hours, or as instructed by your provider, to check your laceration. Remove the dressing sooner if it seems too tight or painful, or if it is getting numb, tingly, or pale past the dressing.  Gently wash your laceration 1-2 times daily with clean water and mild soap. It is okay to shower or run clean water over the laceration, but do not let the laceration soak in water (no swimming).  If your laceration was closed with wound adhesive or strips: pat it dry and leave it open to the air. For all other repairs: after you wash your laceration, or at least  2 times a day, apply antibiotic ointment (such as Neosporin  or Bacitracin ) to the laceration, then cover it with a Band-Aid  or gauze.  Keep the laceration clean. Wear gloves or other protective clothing if you are around dirt.    Follow-up for removal:  If your wound was closed with staples or regular stitches, they need to be removed according to the instructions and timeline specified by your provider today.  If your wound was closed with absorbable ( dissolving ) sutures, they should fall out, dissolve, or not be visible in about one week. If they are still visible, then they should be removed according to the instructions and timeline specified by your provider today.    Scars:  To help minimize scarring:  Wear sunscreen over the healed laceration when out in the sun.  Massage the area regularly once healed.  You may apply Vitamin E to the healed wound.  Wait. Scars improve in appearance over months and years.    If you were given a prescription for medicine here today, be sure to read all of the information (including the package insert) that comes with your prescription.  This will include important information about the medicine, its side effects, and any warnings that you need to know about.  The pharmacist who fills the prescription can provide more information and answer questions you may have about the medicine.  If you have questions or concerns that the pharmacist cannot address, please call or return to the Emergency Department.       Remember that you can always come back to the Emergency Department if you are not able to see your regular provider in the amount of time listed above, if you get any new symptoms, or if there is anything that worries you.;    Discharge Instructions  Head Injury    You have been seen today for a head injury. Your evaluation included a history and physical examination. You may have had a CT (CAT) scan performed, though most head injuries do not require a scan. Based on  this evaluation, your provider today does not feel that your head injury is serious.    Generally, every Emergency Department visit should have a follow-up clinic visit with either a primary or a specialty clinic/provider. Please follow-up as instructed by your emergency provider today.  Return to the Emergency Department if:  You are confused or you are not acting right.  Your headache gets worse or you start to have a really bad headache even with your recommended treatment plan.  You vomit (throw up) more than once.  You have a seizure.  You have trouble walking.  You have weakness or paralysis (cannot move) in an arm or a leg.  You have blood or fluid coming from your ears or nose.  You have new symptoms or anything that worries you.    Sleeping:  It is okay for you to sleep, but someone should wake you up if instructed by your provider, and someone should check on you at your usual time to wake up.     Activity:  Do not drive for at least 24 hours.  Do not drive if you have dizzy spells or trouble concentrating, or remembering things.  Do not return to any contact sports until cleared by your regular provider.     MORE INFORMATION:    Concussion:  A concussion is a minor head injury that may cause temporary problems with the way the brain works. Although concussions are important, they are generally not an emergency or a reason that a person needs to be hospitalized. Some concussion symptoms include confusion, amnesia (forgetful), nausea (sick to your stomach) and vomiting (throwing up), dizziness, fatigue, memory or concentration problems, irritability and sleep problems. For most people, concussions are mild and temporary but some will have more severe and persistent symptoms that require on-going care and treatment.  CT Scans: Your evaluation today may have included a CT scan (CAT scan) to look for things like bleeding or a skull fracture (broken bone).  CT scans involve radiation and too many CT scans can  cause serious health problems like cancer, especially in children.  Because of this, your provider may not have ordered a CT scan today if they think you are at low risk for a serious or life threatening problem.    If you were given a prescription for medicine here today, be sure to read all of the information (including the package insert) that comes with your prescription.  This will include important information about the medicine, its side effects, and any warnings that you need to know about.  The pharmacist who fills the prescription can provide more information and answer questions you may have about the medicine.  If you have questions or concerns that the pharmacist cannot address, please call or return to the Emergency Department.     Remember that you can always come back to the Emergency Department if you are not able to see your regular provider in the amount of time listed above, if you get any new symptoms, or if there is anything that worries you.

## 2021-03-29 ENCOUNTER — NURSE TRIAGE (OUTPATIENT)
Dept: NURSING | Facility: CLINIC | Age: 66
End: 2021-03-29

## 2021-03-29 NOTE — TELEPHONE ENCOUNTER
"Cleveland asks if he can apply Bactitracin ointment directly to his stitches/lacerated wound. FNA advised yes per AVS:  \"Home Care:  Take your dressing off in 12-24 hours, or as instructed by your provider, to check your laceration. Remove the  dressing sooner if it seems too tight or painful, or if it is getting numb, tingly, or pale past the dressing.  Gently wash your laceration 1-2 times daily with clean water and mild soap. It is okay to shower or run clean water  over the laceration, but do not let the laceration soak in water (no swimming).  If your laceration was closed with wound adhesive or strips: pat it dry and leave it open to the air. For all other  repairs: after you wash your laceration, or at least 2 times a day, apply antibiotic ointment (such as Neosporin  or  Bacitracin ) to the laceration, then cover it with a Band-Aid  or gauze.  Keep the laceration clean. Wear gloves or other protective clothing if you are around dirt.\"    He plans to shower in AM then apply the ointment. 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 FNA recommended that best to shower at night, but he would prefer to shower in AM which is fine, as long as wound is kept clean.    Caller verbalized understanding.    Radha Whitten RN/[place] Nurse Advisor    Additional Information    Information only question and nurse able to answer    Protocols used: INFORMATION ONLY CALL - NO TRIAGE-A-[place]      "

## 2021-04-09 DIAGNOSIS — E78.5 HYPERLIPIDEMIA LDL GOAL <130: ICD-10-CM

## 2021-04-12 RX ORDER — ATORVASTATIN CALCIUM 20 MG/1
TABLET, FILM COATED ORAL
Qty: 90 TABLET | Refills: 3 | OUTPATIENT
Start: 2021-04-12

## 2021-04-12 NOTE — TELEPHONE ENCOUNTER
--I left voice mail for Cleveland asking him to call back for a clarification.    --It looks like he has changed providers to [facility].     --Please confirm this. And if he has changed to [facility],  let him know that I sent message to his pharmacy to send this refill request to his [facility] provider    Message sent to pharmacy - Refusal reason: Originating/Specialty Provider to approve (Ermias Good MD [facility]  Marysol KRISHNAN

## 2021-06-07 ENCOUNTER — OFFICE VISIT (OUTPATIENT)
Dept: OTOLARYNGOLOGY | Facility: CLINIC | Age: 66
End: 2021-06-07
Payer: COMMERCIAL

## 2021-06-07 VITALS
WEIGHT: 192 LBS | HEART RATE: 64 BPM | TEMPERATURE: 97.1 F | BODY MASS INDEX: 24.64 KG/M2 | OXYGEN SATURATION: 99 % | HEIGHT: 74 IN

## 2021-06-07 DIAGNOSIS — R49.0 DYSPHONIA: Primary | ICD-10-CM

## 2021-06-07 DIAGNOSIS — R41.3 MEMORY LOSS: ICD-10-CM

## 2021-06-07 DIAGNOSIS — Z78.9 RECURRENT RESPIRATORY PAPILLOMATOSIS: ICD-10-CM

## 2021-06-07 PROCEDURE — 31579 LARYNGOSCOPY TELESCOPIC: CPT | Performed by: OTOLARYNGOLOGY

## 2021-06-07 PROCEDURE — 99215 OFFICE O/P EST HI 40 MIN: CPT | Mod: 25 | Performed by: OTOLARYNGOLOGY

## 2021-06-07 RX ORDER — DONEPEZIL HYDROCHLORIDE 10 MG/1
TABLET, FILM COATED ORAL AT BEDTIME
COMMUNITY
Start: 2021-04-30

## 2021-06-07 RX ORDER — ATORVASTATIN CALCIUM 20 MG/1
20 TABLET, FILM COATED ORAL
COMMUNITY
Start: 2021-03-30 | End: 2024-01-30

## 2021-06-07 ASSESSMENT — MIFFLIN-ST. JEOR: SCORE: 1715.66

## 2021-06-07 ASSESSMENT — PAIN SCALES - GENERAL: PAINLEVEL: NO PAIN (0)

## 2021-06-07 NOTE — PROGRESS NOTES
"Dear Colleague:  Cleveland Rodríguez recently returned for follow-up at the [facility]. My clinic note from our visit is enclosed below.  Speech recognition software may have been used in the documentation below; input is reviewed before signature to the best of my ability.     I appreciate the ongoing opportunity to participate in this patient's care.    Please feel free to contact me with any questions.      Sincerely yours,  Betty Mitchell M.D., M.P.H.  , Laryngology  Director, [facility]  Otolaryngology- Head & Neck Surgery  [phone]            =====  HISTORY OF PRESENT ILLNESS:  Cleveland Rodríguez is a pleasant 65 year old male with recurrent respiratory papillomatosis (RRP), HPV6+, who returns in follow up today. He has a history of numerous operating room procedures for this.     His most recent OR procedure was in 10/26/2017.  His most recent procedure was an awake KTP laryngoscopy in 09/06/2019.     He reports he is not too bothered by his voice but his wife encouraged him to come in to be seen. Over the past few months his voice has been getting a little worse.    He reports he underwent memory testing and it \"wasn't good\"--is now on Aricept, thinks it may be helpful. Planning repeat testing in 6-7 months.    Has been having nasal drainage. Is taking a medication OTC, believes it is loratidine.      MEDICATIONS:     Current Outpatient Medications   Medication Sig Dispense Refill     aspirin 81 MG tablet Take 1 tablet by mouth daily        atorvastatin (LIPITOR) 20 MG tablet Take 20 mg by mouth       atorvastatin (LIPITOR) 20 MG tablet TAKE 1 TABLET BY MOUTH EVERY DAY 90 tablet 3     donepezil (ARICEPT) 10 MG tablet        doxycycline hyclate (VIBRAMYCIN) 100 MG capsule Take 1 capsule (100 mg) by mouth 2 times daily 5 capsule 0     GARLIC OIL PO        KRILL OIL PO        metroNIDAZOLE (METROGEL) 1 % gel Apply 1 g topically daily apply hs " for Rosacea 60 g 11     Multiple Vitamin (DAILY MULTIVITAMIN PO) Take  by mouth 2 times daily.       Omega-3 Fatty Acids (OMEGA-3 FISH OIL PO)          ALLERGIES:    Allergies   Allergen Reactions     Amoxicillin      Rash         NEW PMH/PSH: None    REVIEW OF SYSTEMS:  The patient completed a comprehensive 11 point review of systems (below), which was reviewed. Positives are as noted below.  Patient Supplied Answers to Review of Systems   ENT ROS 7/29/2019   Constitutional -   Ears, Nose, Throat -   Cardiopulmonary -   Musculoskeletal -   Endocrine Frequent urination         PHYSICAL EXAM:  General: The patient was alert and conversant, and in no acute distress.    Oral cavity/oropharynx: No masses or lesions. Dentition unchanged since prior. Tongue mobility and palate elevation intact and symmetric.  Neck: No palpable cervical lymphadenopathy, no significant tenderness to palpation of the thyrohyoid space, which was narrow. No obvious thyroid abnormality.  Resp: Breathing comfortably, no stridor or stertor.  Neuro: Symmetric facial function. Other cranial nerve function as documented above.  Psych: Normal affect, pleasant and cooperative.  Voice/speech: Mild to moderate dysphonia characterized by breathiness and roughness.      Intake scores  Last 2 Scores for Patient-Answered VHI Questionnaire  VHI Total Score 1/21/2019 7/29/2019   VHI Total Score 9 Incomplete      Last 2 Scores for Patient-Answered EAT Questionnaire  EAT Total Score 11/13/2017 10/19/2018   EAT Total Score 1 0      Last 2 Scores for Patient-Answered CSI Questionnaire  CSI Total Score 11/13/2017 10/19/2018   CSI Total Score 1 4       Procedure:   Flexible fiberoptic laryngoscopy and laryngovideostroboscopy  Indications: This procedure was warranted to evaluate the patient's laryngeal anatomy and function. Risks, benefits, and alternatives were discussed.  Description: After written informed consent was obtained, a time-out was performed to  confirm patient identity, procedure, and procedure site. Topical 3% lidocaine with 0.25% phenylephrine was applied to the nasal cavities. I performed the endoscopy and no complications were apparent. Continuous and stroboscopic light were utilized to assess routine phonation and variable frequency phonation.  Performed by: Betty Mitchell MD MPH  Findings: Normal nasopharynx. Normal base of tongue, valleculae, and epiglottis. Vocal fold mobility: right: normal; left: normal. Medial edges of the vocal folds: diffusely irregular with bilateral true vocal fold papilloma. Anterior and posterior glottic webs; anterior web may be slightly more pronounced than prior.   Glissade produced appropriate elongation. There was mild to moderate supraglottic recruitment with connected speech. Mucosa of false vocal folds, aryepiglottic folds, piriform sinuses, and posterior glottis unremarkable with the exception of right supraglottic papilloma. Airway was patent.   Similar findings on NBI.    The addition of stroboscopy allowed evaluation of the mucosal wave.   Amplitude: right: mildly decreased; left: moderately decreased. Symmetry: intermittent symmetry. Closure pattern: complete and irregular. Closure plane: at glottic level. Phase distribution: normal.            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    Options include:  1) observation, although we know the papilloma will likely slowly worsen over time  2) awake KTP laser procedure--he prefers not to pursue this  3) microDL with excision/ablation of papilloma under general anesthesia  He would like to discuss this with his wife and then let us know how he would like to proceed.    We also discussed consideration of HPV vaccination for possibly reducing recurrence risk, noted that he is outside the typical age range but for some patients it may be helpful.    Finally I also recommended consideration of audiogram  given links between hearing loss and cognitive changes for some patients, and consideration of Flonase or similar nasal spray for nasal drainage. He would like to do an audiogram but might prefer to do it closer to home so will discuss with his primary care provider or his memory care provider. I asked him to let me know if he needs help getting that set up.    Today's visit required additional screening time, PPE, and cleaning measures to allow for a safe in-person visit, due to the public health emergency. I spent a total of 42 minutes on 6/7/2021 in chart review, review of tests, patient visit, documentation, care coordination, and/or discussion with other providers about the issues documented above, separate from any documented procedure(s).

## 2021-06-07 NOTE — PATIENT INSTRUCTIONS
Is it loratidine that you are taking for your nasal drainage/allergies? If so that is fine to continue. You could also consider trying Flonase or a similar nasal spray.    We also discussed consideration of  -HPV vaccine  -audiogram (hearing test)    For the RRP, options include:  1) observation (do nothing for now), although we know the papilloma will likely slowly worsen over time  2) awake laser procedure  3) throat surgery under general anesthesia    Please contact us once you have discussed these options with your wife and decided what to do

## 2021-06-07 NOTE — NURSING NOTE
"Chief Complaint   Patient presents with     RECHECK     7 month follow up       Pulse 64, temperature 97.1  F (36.2  C), temperature source Temporal, height 1.88 m (6' 2\"), weight 87.1 kg (192 lb), SpO2 99 %.    Ventura Quijano, EMT  "

## 2021-06-07 NOTE — LETTER
"6/7/2021      RE: Cleveland Rodríguez  [address] [place] [address]       Dear Colleague:  Cleveland Rodríguez recently returned for follow-up at the [facility]. My clinic note from our visit is enclosed below.  Speech recognition software may have been used in the documentation below; input is reviewed before signature to the best of my ability.     I appreciate the ongoing opportunity to participate in this patient's care.    Please feel free to contact me with any questions.      Sincerely yours,  Betty Mitchell M.D., M.P.H.  , Laryngology  Director, [facility]  Otolaryngology- Head & Neck Surgery  [phone]            =====  HISTORY OF PRESENT ILLNESS:  Cleveland Rodríguez is a pleasant 65 year old male with recurrent respiratory papillomatosis (RRP), HPV6+, who returns in follow up today. He has a history of numerous operating room procedures for this.     His most recent OR procedure was in 10/26/2017.  His most recent procedure was an awake KTP laryngoscopy in 09/06/2019.     He reports he is not too bothered by his voice but his wife encouraged him to come in to be seen. Over the past few months his voice has been getting a little worse.    He reports he underwent memory testing and it \"wasn't good\"--is now on Aricept, thinks it may be helpful. Planning repeat testing in 6-7 months.    Has been having nasal drainage. Is taking a medication OTC, believes it is loratidine.      MEDICATIONS:     Current Outpatient Medications   Medication Sig Dispense Refill     aspirin 81 MG tablet Take 1 tablet by mouth daily        atorvastatin (LIPITOR) 20 MG tablet Take 20 mg by mouth       atorvastatin (LIPITOR) 20 MG tablet TAKE 1 TABLET BY MOUTH EVERY DAY 90 tablet 3     donepezil (ARICEPT) 10 MG tablet        doxycycline hyclate (VIBRAMYCIN) 100 MG capsule Take 1 capsule (100 mg) by mouth 2 times daily 5 capsule 0     GARLIC OIL PO        KRILL " OIL PO        metroNIDAZOLE (METROGEL) 1 % gel Apply 1 g topically daily apply hs for Rosacea 60 g 11     Multiple Vitamin (DAILY MULTIVITAMIN PO) Take  by mouth 2 times daily.       Omega-3 Fatty Acids (OMEGA-3 FISH OIL PO)          ALLERGIES:    Allergies   Allergen Reactions     Amoxicillin      Rash         NEW PMH/PSH: None    REVIEW OF SYSTEMS:  The patient completed a comprehensive 11 point review of systems (below), which was reviewed. Positives are as noted below.  Patient Supplied Answers to Review of Systems   ENT ROS 7/29/2019   Constitutional -   Ears, Nose, Throat -   Cardiopulmonary -   Musculoskeletal -   Endocrine Frequent urination         PHYSICAL EXAM:  General: The patient was alert and conversant, and in no acute distress.    Oral cavity/oropharynx: No masses or lesions. Dentition unchanged since prior. Tongue mobility and palate elevation intact and symmetric.  Neck: No palpable cervical lymphadenopathy, no significant tenderness to palpation of the thyrohyoid space, which was narrow. No obvious thyroid abnormality.  Resp: Breathing comfortably, no stridor or stertor.  Neuro: Symmetric facial function. Other cranial nerve function as documented above.  Psych: Normal affect, pleasant and cooperative.  Voice/speech: Mild to moderate dysphonia characterized by breathiness and roughness.      Intake scores  Last 2 Scores for Patient-Answered VHI Questionnaire  VHI Total Score 1/21/2019 7/29/2019   VHI Total Score 9 Incomplete      Last 2 Scores for Patient-Answered EAT Questionnaire  EAT Total Score 11/13/2017 10/19/2018   EAT Total Score 1 0      Last 2 Scores for Patient-Answered CSI Questionnaire  CSI Total Score 11/13/2017 10/19/2018   CSI Total Score 1 4       Procedure:   Flexible fiberoptic laryngoscopy and laryngovideostroboscopy  Indications: This procedure was warranted to evaluate the patient's laryngeal anatomy and function. Risks, benefits, and alternatives were  discussed.  Description: After written informed consent was obtained, a time-out was performed to confirm patient identity, procedure, and procedure site. Topical 3% lidocaine with 0.25% phenylephrine was applied to the nasal cavities. I performed the endoscopy and no complications were apparent. Continuous and stroboscopic light were utilized to assess routine phonation and variable frequency phonation.  Performed by: Betty Mitchell MD MPH  Findings: Normal nasopharynx. Normal base of tongue, valleculae, and epiglottis. Vocal fold mobility: right: normal; left: normal. Medial edges of the vocal folds: diffusely irregular with bilateral true vocal fold papilloma. Anterior and posterior glottic webs; anterior web may be slightly more pronounced than prior.   Glissade produced appropriate elongation. There was mild to moderate supraglottic recruitment with connected speech. Mucosa of false vocal folds, aryepiglottic folds, piriform sinuses, and posterior glottis unremarkable with the exception of right supraglottic papilloma. Airway was patent.   Similar findings on NBI.    The addition of stroboscopy allowed evaluation of the mucosal wave.   Amplitude: right: mildly decreased; left: moderately decreased. Symmetry: intermittent symmetry. Closure pattern: complete and irregular. Closure plane: at glottic level. Phase distribution: normal.            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    Options include:  1) observation, although we know the papilloma will likely slowly worsen over time  2) awake KTP laser procedure--he prefers not to pursue this  3) microDL with excision/ablation of papilloma under general anesthesia  He would like to discuss this with his wife and then let us know how he would like to proceed.    We also discussed consideration of HPV vaccination for possibly reducing recurrence risk, noted that he is outside the typical age range  but for some patients it may be helpful.    Finally I also recommended consideration of audiogram given links between hearing loss and cognitive changes for some patients, and consideration of Flonase or similar nasal spray for nasal drainage. He would like to do an audiogram but might prefer to do it closer to home so will discuss with his primary care provider or his memory care provider. I asked him to let me know if he needs help getting that set up.    Today's visit required additional screening time, PPE, and cleaning measures to allow for a safe in-person visit, due to the public health emergency. I spent a total of 42 minutes on 6/7/2021 in chart review, review of tests, patient visit, documentation, care coordination, and/or discussion with other providers about the issues documented above, separate from any documented procedure(s).        Betty Mitchell MD

## 2021-06-15 ENCOUNTER — PATIENT OUTREACH (OUTPATIENT)
Dept: OTOLARYNGOLOGY | Facility: CLINIC | Age: 66
End: 2021-06-15

## 2021-06-15 NOTE — PROGRESS NOTES
Left voicemail for patient to follow up after last weeks office visit    Would like to discuss whether patient would like to proceed with observation vs. microDL procedure    Encouraged to call back to discuss    Kat Esparza RN on 6/15/2021 at 1:12 PM

## 2021-06-17 NOTE — PROGRESS NOTES
"Patient returned call    Patient would like to proceed with microDL laryngoscopy under general anesthesia    Patient would like to schedule for August    Informed patient that Dr. Mitchell operates in Thursdays, so take a look at his schedule for that    Informed patient will need pre-op within 30 days of procedure and will need a covid test within 4 days    Patient stated, \"If the  calls, she may need to leave a message\". Informed patient that  will leave a message and phone number for the patient to call back at.    Patient in understanding of plan and has no further questions at this time.    Kat Esparza RN on 6/17/2021 at 11:23 AM    "

## 2021-07-01 ENCOUNTER — TELEPHONE (OUTPATIENT)
Dept: OTOLARYNGOLOGY | Facility: CLINIC | Age: 66
End: 2021-07-01

## 2021-07-01 DIAGNOSIS — Z78.9 RECURRENT RESPIRATORY PAPILLOMATOSIS: ICD-10-CM

## 2021-07-01 DIAGNOSIS — R49.0 DYSPHONIA: Primary | ICD-10-CM

## 2021-07-03 DIAGNOSIS — Z11.59 ENCOUNTER FOR SCREENING FOR OTHER VIRAL DISEASES: ICD-10-CM

## 2021-07-05 NOTE — TELEPHONE ENCOUNTER
"Called to schedule patient with Dr. Mitchell. Discussed dates at end of August. Offered patient August 26th at [facility] with Dr. Mitchell.  Patient states that he thinks this should work and will call back Tuesday to confirm either way.  Attempted to discuss pre-op H&P and covid-19 testing within 4 days. When discussing patient stated that he was \"confused\". Packet was mailed - 7/5 and patient will review and call with questions and/or concerns.         "

## 2021-07-07 ENCOUNTER — TELEPHONE (OUTPATIENT)
Dept: OTOLARYNGOLOGY | Facility: CLINIC | Age: 66
End: 2021-07-07

## 2021-07-07 NOTE — TELEPHONE ENCOUNTER
Patient called stating that he has a date that will work for him now. He would like to reschedule surgery to 10/7/2021 at the hospital. Patient aware writer will not be sending new packet. He was instructed to make pre-op appt 2 weeks prior to surgery.

## 2021-07-09 ENCOUNTER — PREP FOR PROCEDURE (OUTPATIENT)
Dept: OTOLARYNGOLOGY | Facility: CLINIC | Age: 66
End: 2021-07-09

## 2021-07-09 RX ORDER — DEXAMETHASONE SODIUM PHOSPHATE 4 MG/ML
10 INJECTION, SOLUTION INTRA-ARTICULAR; INTRALESIONAL; INTRAMUSCULAR; INTRAVENOUS; SOFT TISSUE ONCE
Status: CANCELLED | OUTPATIENT
Start: 2021-07-09 | End: 2021-07-09

## 2021-07-09 RX ORDER — CLINDAMYCIN PHOSPHATE 900 MG/50ML
900 INJECTION, SOLUTION INTRAVENOUS
Status: CANCELLED | OUTPATIENT
Start: 2021-07-09

## 2021-07-09 RX ORDER — CLINDAMYCIN PHOSPHATE 900 MG/50ML
900 INJECTION, SOLUTION INTRAVENOUS SEE ADMIN INSTRUCTIONS
Status: CANCELLED | OUTPATIENT
Start: 2021-07-09

## 2021-07-12 ENCOUNTER — TELEPHONE (OUTPATIENT)
Dept: OTOLARYNGOLOGY | Facility: CLINIC | Age: 66
End: 2021-07-12

## 2021-07-12 NOTE — TELEPHONE ENCOUNTER
"Patient called in stating that he thinks that he needs to do something prior to his surgery with Dr. Mitchell.  Discussed the pre-op and covid-19 testing. Pt declined and stated that that was not it. Asked patient if it was the pre-op speech therapy prior to surgery that the schedulers called for? He stated that was not it either.     Patient states that he discussed this with writer. Explained to patient that we had only discussed the pre-op and covid-19 test prior to surgery.   Informed writer that maybe EULOGIO Stephens  Would know if they had discussed something else and can ask her tomorrow when she is in clinic.     Pt states that he's confused and \"lets just leave things the way they are\".   Pt will call back if has any further questions or concerns.      Jaki Sainz on 7/12/2021 at 3:44 PM    "

## 2021-07-14 ENCOUNTER — TELEPHONE (OUTPATIENT)
Dept: OTOLARYNGOLOGY | Facility: CLINIC | Age: 66
End: 2021-07-14

## 2021-07-14 NOTE — TELEPHONE ENCOUNTER
Tried calling this patient to schedule a 60min [facility] New SLP Voice appt with Segundo Meza. Must be completed before surgery on 10/7.    Appt Note: Pre-Op Voice Therapy, DOS- 10/07/21

## 2021-07-21 ENCOUNTER — TELEPHONE (OUTPATIENT)
Dept: OTOLARYNGOLOGY | Facility: CLINIC | Age: 66
End: 2021-07-21

## 2021-07-21 NOTE — TELEPHONE ENCOUNTER
I informed the patient that Segundo is unable to come in 2-3 hours early to see the patient at 5am or 6am. The patient also stated that he could come in Friday 8/27 @ 8am. Unfortunately no one is available on that day.    He can be scheduled as: [facility] Return Voice SLP, with Segundo Meza or Humberto Whitley anytime that works with the patient before his surgery on 10/07/2021.    Appt Note: Pre-Op Voice Therapy

## 2021-10-03 ENCOUNTER — LAB (OUTPATIENT)
Dept: URGENT CARE | Facility: URGENT CARE | Age: 66
End: 2021-10-03
Attending: OTOLARYNGOLOGY
Payer: COMMERCIAL

## 2021-10-03 DIAGNOSIS — Z11.59 ENCOUNTER FOR SCREENING FOR OTHER VIRAL DISEASES: ICD-10-CM

## 2021-10-03 PROCEDURE — U0003 INFECTIOUS AGENT DETECTION BY NUCLEIC ACID (DNA OR RNA); SEVERE ACUTE RESPIRATORY SYNDROME CORONAVIRUS 2 (SARS-COV-2) (CORONAVIRUS DISEASE [COVID-19]), AMPLIFIED PROBE TECHNIQUE, MAKING USE OF HIGH THROUGHPUT TECHNOLOGIES AS DESCRIBED BY CMS-2020-01-R: HCPCS

## 2021-10-03 PROCEDURE — U0005 INFEC AGEN DETEC AMPLI PROBE: HCPCS

## 2021-10-04 LAB — SARS-COV-2 RNA RESP QL NAA+PROBE: NEGATIVE

## 2021-10-07 ENCOUNTER — ANESTHESIA (OUTPATIENT)
Dept: SURGERY | Facility: CLINIC | Age: 66
End: 2021-10-07
Payer: COMMERCIAL

## 2021-10-07 ENCOUNTER — HOSPITAL ENCOUNTER (OUTPATIENT)
Facility: CLINIC | Age: 66
Discharge: HOME | End: 2021-10-07
Attending: OTOLARYNGOLOGY | Admitting: OTOLARYNGOLOGY
Payer: COMMERCIAL

## 2021-10-07 ENCOUNTER — ANESTHESIA EVENT (OUTPATIENT)
Dept: SURGERY | Facility: CLINIC | Age: 66
End: 2021-10-07
Payer: COMMERCIAL

## 2021-10-07 VITALS
OXYGEN SATURATION: 95 % | DIASTOLIC BLOOD PRESSURE: 69 MMHG | SYSTOLIC BLOOD PRESSURE: 98 MMHG | TEMPERATURE: 97 F | WEIGHT: 190.92 LBS | HEART RATE: 65 BPM | BODY MASS INDEX: 23.74 KG/M2 | HEIGHT: 75 IN | RESPIRATION RATE: 14 BRPM

## 2021-10-07 DIAGNOSIS — R49.0 DYSPHONIA: ICD-10-CM

## 2021-10-07 DIAGNOSIS — Z78.9 RECURRENT RESPIRATORY PAPILLOMATOSIS: ICD-10-CM

## 2021-10-07 LAB — GLUCOSE BLDC GLUCOMTR-MCNC: 91 MG/DL (ref 70–99)

## 2021-10-07 PROCEDURE — 360N000077 HC SURGERY LEVEL 4, PER MIN: Performed by: OTOLARYNGOLOGY

## 2021-10-07 PROCEDURE — 250N000009 HC RX 250: Performed by: ANESTHESIOLOGY

## 2021-10-07 PROCEDURE — 250N000011 HC RX IP 250 OP 636: Performed by: ANESTHESIOLOGY

## 2021-10-07 PROCEDURE — 250N000011 HC RX IP 250 OP 636: Performed by: OTOLARYNGOLOGY

## 2021-10-07 PROCEDURE — 88305 TISSUE EXAM BY PATHOLOGIST: CPT | Mod: TC | Performed by: OTOLARYNGOLOGY

## 2021-10-07 PROCEDURE — 710N000010 HC RECOVERY PHASE 1, LEVEL 2, PER MIN: Performed by: OTOLARYNGOLOGY

## 2021-10-07 PROCEDURE — 250N000011 HC RX IP 250 OP 636: Performed by: STUDENT IN AN ORGANIZED HEALTH CARE EDUCATION/TRAINING PROGRAM

## 2021-10-07 PROCEDURE — 370N000017 HC ANESTHESIA TECHNICAL FEE, PER MIN: Performed by: OTOLARYNGOLOGY

## 2021-10-07 PROCEDURE — 82962 GLUCOSE BLOOD TEST: CPT

## 2021-10-07 PROCEDURE — 258N000003 HC RX IP 258 OP 636: Performed by: ANESTHESIOLOGY

## 2021-10-07 PROCEDURE — 710N000012 HC RECOVERY PHASE 2, PER MINUTE: Performed by: OTOLARYNGOLOGY

## 2021-10-07 PROCEDURE — 31541 LARYNSCOP W/TUMR EXC + SCOPE: CPT | Performed by: OTOLARYNGOLOGY

## 2021-10-07 PROCEDURE — 258N000003 HC RX IP 258 OP 636: Performed by: STUDENT IN AN ORGANIZED HEALTH CARE EDUCATION/TRAINING PROGRAM

## 2021-10-07 PROCEDURE — 999N000141 HC STATISTIC PRE-PROCEDURE NURSING ASSESSMENT: Performed by: OTOLARYNGOLOGY

## 2021-10-07 PROCEDURE — 250N000009 HC RX 250: Performed by: OTOLARYNGOLOGY

## 2021-10-07 PROCEDURE — 250N000009 HC RX 250: Performed by: STUDENT IN AN ORGANIZED HEALTH CARE EDUCATION/TRAINING PROGRAM

## 2021-10-07 PROCEDURE — 272N000001 HC OR GENERAL SUPPLY STERILE: Performed by: OTOLARYNGOLOGY

## 2021-10-07 RX ORDER — PROPOFOL 10 MG/ML
INJECTION, EMULSION INTRAVENOUS PRN
Status: DISCONTINUED | OUTPATIENT
Start: 2021-10-07 | End: 2021-10-07

## 2021-10-07 RX ORDER — ONDANSETRON 2 MG/ML
INJECTION INTRAMUSCULAR; INTRAVENOUS PRN
Status: DISCONTINUED | OUTPATIENT
Start: 2021-10-07 | End: 2021-10-07

## 2021-10-07 RX ORDER — OXYCODONE HYDROCHLORIDE 5 MG/1
5 TABLET ORAL EVERY 4 HOURS PRN
Status: DISCONTINUED | OUTPATIENT
Start: 2021-10-07 | End: 2021-10-07 | Stop reason: HOSPADM

## 2021-10-07 RX ORDER — NALOXONE HYDROCHLORIDE 0.4 MG/ML
0.4 INJECTION, SOLUTION INTRAMUSCULAR; INTRAVENOUS; SUBCUTANEOUS
Status: DISCONTINUED | OUTPATIENT
Start: 2021-10-07 | End: 2021-10-07 | Stop reason: HOSPADM

## 2021-10-07 RX ORDER — FENTANYL CITRATE 50 UG/ML
25 INJECTION, SOLUTION INTRAMUSCULAR; INTRAVENOUS
Status: DISCONTINUED | OUTPATIENT
Start: 2021-10-07 | End: 2021-10-07 | Stop reason: HOSPADM

## 2021-10-07 RX ORDER — NALOXONE HYDROCHLORIDE 0.4 MG/ML
0.2 INJECTION, SOLUTION INTRAMUSCULAR; INTRAVENOUS; SUBCUTANEOUS
Status: DISCONTINUED | OUTPATIENT
Start: 2021-10-07 | End: 2021-10-07 | Stop reason: HOSPADM

## 2021-10-07 RX ORDER — ALBUTEROL SULFATE 0.83 MG/ML
2.5 SOLUTION RESPIRATORY (INHALATION) EVERY 4 HOURS PRN
Status: DISCONTINUED | OUTPATIENT
Start: 2021-10-07 | End: 2021-10-07 | Stop reason: HOSPADM

## 2021-10-07 RX ORDER — CLINDAMYCIN PHOSPHATE 900 MG/50ML
900 INJECTION, SOLUTION INTRAVENOUS
Status: COMPLETED | OUTPATIENT
Start: 2021-10-07 | End: 2021-10-07

## 2021-10-07 RX ORDER — SODIUM CHLORIDE, SODIUM LACTATE, POTASSIUM CHLORIDE, CALCIUM CHLORIDE 600; 310; 30; 20 MG/100ML; MG/100ML; MG/100ML; MG/100ML
INJECTION, SOLUTION INTRAVENOUS CONTINUOUS PRN
Status: DISCONTINUED | OUTPATIENT
Start: 2021-10-07 | End: 2021-10-07

## 2021-10-07 RX ORDER — LIDOCAINE 40 MG/G
CREAM TOPICAL
Status: DISCONTINUED | OUTPATIENT
Start: 2021-10-07 | End: 2021-10-07 | Stop reason: HOSPADM

## 2021-10-07 RX ORDER — DEXAMETHASONE SODIUM PHOSPHATE 4 MG/ML
INJECTION, SOLUTION INTRA-ARTICULAR; INTRALESIONAL; INTRAMUSCULAR; INTRAVENOUS; SOFT TISSUE PRN
Status: DISCONTINUED | OUTPATIENT
Start: 2021-10-07 | End: 2021-10-07

## 2021-10-07 RX ORDER — CLINDAMYCIN PHOSPHATE 900 MG/50ML
900 INJECTION, SOLUTION INTRAVENOUS SEE ADMIN INSTRUCTIONS
Status: DISCONTINUED | OUTPATIENT
Start: 2021-10-07 | End: 2021-10-07 | Stop reason: HOSPADM

## 2021-10-07 RX ORDER — HALOPERIDOL 5 MG/ML
1 INJECTION INTRAMUSCULAR
Status: DISCONTINUED | OUTPATIENT
Start: 2021-10-07 | End: 2021-10-07 | Stop reason: HOSPADM

## 2021-10-07 RX ORDER — FENTANYL CITRATE-0.9 % NACL/PF 10 MCG/ML
PLASTIC BAG, INJECTION (ML) INTRAVENOUS CONTINUOUS PRN
Status: DISCONTINUED | OUTPATIENT
Start: 2021-10-07 | End: 2021-10-07

## 2021-10-07 RX ORDER — LORAZEPAM 2 MG/ML
.5-1 INJECTION INTRAMUSCULAR
Status: DISCONTINUED | OUTPATIENT
Start: 2021-10-07 | End: 2021-10-07 | Stop reason: HOSPADM

## 2021-10-07 RX ORDER — LIDOCAINE HYDROCHLORIDE 40 MG/ML
SOLUTION TOPICAL PRN
Status: DISCONTINUED | OUTPATIENT
Start: 2021-10-07 | End: 2021-10-07 | Stop reason: HOSPADM

## 2021-10-07 RX ORDER — PROPOFOL 10 MG/ML
INJECTION, EMULSION INTRAVENOUS CONTINUOUS PRN
Status: DISCONTINUED | OUTPATIENT
Start: 2021-10-07 | End: 2021-10-07

## 2021-10-07 RX ORDER — ONDANSETRON 2 MG/ML
4 INJECTION INTRAMUSCULAR; INTRAVENOUS EVERY 30 MIN PRN
Status: DISCONTINUED | OUTPATIENT
Start: 2021-10-07 | End: 2021-10-07 | Stop reason: HOSPADM

## 2021-10-07 RX ORDER — FENTANYL CITRATE 50 UG/ML
50 INJECTION, SOLUTION INTRAMUSCULAR; INTRAVENOUS EVERY 5 MIN PRN
Status: DISCONTINUED | OUTPATIENT
Start: 2021-10-07 | End: 2021-10-07 | Stop reason: HOSPADM

## 2021-10-07 RX ORDER — FENTANYL CITRATE 50 UG/ML
INJECTION, SOLUTION INTRAMUSCULAR; INTRAVENOUS PRN
Status: DISCONTINUED | OUTPATIENT
Start: 2021-10-07 | End: 2021-10-07

## 2021-10-07 RX ORDER — SODIUM CHLORIDE, SODIUM LACTATE, POTASSIUM CHLORIDE, CALCIUM CHLORIDE 600; 310; 30; 20 MG/100ML; MG/100ML; MG/100ML; MG/100ML
INJECTION, SOLUTION INTRAVENOUS CONTINUOUS
Status: DISCONTINUED | OUTPATIENT
Start: 2021-10-07 | End: 2021-10-07 | Stop reason: HOSPADM

## 2021-10-07 RX ORDER — MEPERIDINE HYDROCHLORIDE 25 MG/ML
12.5 INJECTION INTRAMUSCULAR; INTRAVENOUS; SUBCUTANEOUS
Status: DISCONTINUED | OUTPATIENT
Start: 2021-10-07 | End: 2021-10-07 | Stop reason: HOSPADM

## 2021-10-07 RX ORDER — ONDANSETRON 4 MG/1
4 TABLET, ORALLY DISINTEGRATING ORAL EVERY 30 MIN PRN
Status: DISCONTINUED | OUTPATIENT
Start: 2021-10-07 | End: 2021-10-07 | Stop reason: HOSPADM

## 2021-10-07 RX ORDER — HYDROMORPHONE HYDROCHLORIDE 1 MG/ML
0.2 INJECTION, SOLUTION INTRAMUSCULAR; INTRAVENOUS; SUBCUTANEOUS EVERY 5 MIN PRN
Status: DISCONTINUED | OUTPATIENT
Start: 2021-10-07 | End: 2021-10-07 | Stop reason: HOSPADM

## 2021-10-07 RX ORDER — DEXAMETHASONE SODIUM PHOSPHATE 10 MG/ML
10 INJECTION, SOLUTION INTRAMUSCULAR; INTRAVENOUS ONCE
Status: DISCONTINUED | OUTPATIENT
Start: 2021-10-07 | End: 2021-10-07 | Stop reason: HOSPADM

## 2021-10-07 RX ORDER — EPINEPHRINE IN SOD CHLOR,ISO 1 MG/10 ML
SYRINGE (ML) INTRAVENOUS PRN
Status: DISCONTINUED | OUTPATIENT
Start: 2021-10-07 | End: 2021-10-07 | Stop reason: HOSPADM

## 2021-10-07 RX ORDER — LIDOCAINE HYDROCHLORIDE 10 MG/ML
INJECTION, SOLUTION INFILTRATION; PERINEURAL PRN
Status: DISCONTINUED | OUTPATIENT
Start: 2021-10-07 | End: 2021-10-07

## 2021-10-07 RX ADMIN — SUGAMMADEX 200 MG: 100 INJECTION, SOLUTION INTRAVENOUS at 10:23

## 2021-10-07 RX ADMIN — SODIUM CHLORIDE, POTASSIUM CHLORIDE, SODIUM LACTATE AND CALCIUM CHLORIDE: 600; 310; 30; 20 INJECTION, SOLUTION INTRAVENOUS at 07:30

## 2021-10-07 RX ADMIN — PHENYLEPHRINE HYDROCHLORIDE 100 MCG: 10 INJECTION INTRAVENOUS at 07:53

## 2021-10-07 RX ADMIN — ROCURONIUM BROMIDE 20 MG: 10 INJECTION INTRAVENOUS at 08:25

## 2021-10-07 RX ADMIN — Medication 20 MCG/MIN: at 08:00

## 2021-10-07 RX ADMIN — ONDANSETRON 4 MG: 2 INJECTION INTRAMUSCULAR; INTRAVENOUS at 10:19

## 2021-10-07 RX ADMIN — ROCURONIUM BROMIDE 20 MG: 10 INJECTION INTRAVENOUS at 09:00

## 2021-10-07 RX ADMIN — ROCURONIUM BROMIDE 10 MG: 10 INJECTION INTRAVENOUS at 08:10

## 2021-10-07 RX ADMIN — ROCURONIUM BROMIDE 50 MG: 10 INJECTION INTRAVENOUS at 07:38

## 2021-10-07 RX ADMIN — FENTANYL CITRATE 150 MCG: 50 INJECTION, SOLUTION INTRAMUSCULAR; INTRAVENOUS at 07:38

## 2021-10-07 RX ADMIN — CLINDAMYCIN IN 5 PERCENT DEXTROSE 900 MG: 18 INJECTION, SOLUTION INTRAVENOUS at 07:45

## 2021-10-07 RX ADMIN — PROPOFOL 150 MCG/KG/MIN: 10 INJECTION, EMULSION INTRAVENOUS at 07:43

## 2021-10-07 RX ADMIN — PROPOFOL 180 MG: 10 INJECTION, EMULSION INTRAVENOUS at 07:38

## 2021-10-07 RX ADMIN — DEXAMETHASONE SODIUM PHOSPHATE 8 MG: 4 INJECTION, SOLUTION INTRA-ARTICULAR; INTRALESIONAL; INTRAMUSCULAR; INTRAVENOUS; SOFT TISSUE at 07:38

## 2021-10-07 RX ADMIN — MIDAZOLAM 1 MG: 1 INJECTION INTRAMUSCULAR; INTRAVENOUS at 09:53

## 2021-10-07 RX ADMIN — SODIUM CHLORIDE, POTASSIUM CHLORIDE, SODIUM LACTATE AND CALCIUM CHLORIDE: 600; 310; 30; 20 INJECTION, SOLUTION INTRAVENOUS at 10:25

## 2021-10-07 RX ADMIN — ROCURONIUM BROMIDE 20 MG: 10 INJECTION INTRAVENOUS at 09:32

## 2021-10-07 RX ADMIN — LIDOCAINE HYDROCHLORIDE 100 MG: 10 INJECTION, SOLUTION INFILTRATION; PERINEURAL at 07:38

## 2021-10-07 RX ADMIN — PROCHLORPERAZINE EDISYLATE 5 MG: 5 INJECTION INTRAMUSCULAR; INTRAVENOUS at 11:26

## 2021-10-07 RX ADMIN — ONDANSETRON 4 MG: 2 INJECTION INTRAMUSCULAR; INTRAVENOUS at 10:45

## 2021-10-07 RX ADMIN — ROCURONIUM BROMIDE 20 MG: 10 INJECTION INTRAVENOUS at 08:48

## 2021-10-07 RX ADMIN — FENTANYL CITRATE 25 MCG: 50 INJECTION INTRAMUSCULAR; INTRAVENOUS at 10:45

## 2021-10-07 ASSESSMENT — MIFFLIN-ST. JEOR: SCORE: 1713.69

## 2021-10-07 NOTE — OP NOTE
PROCEDURE(S):  Microdirect laryngoscopy with CO2 laser excision and ablation of laryngeal papillomas  Microdirect laryngoscopy with biopsies  Tracheobronchoscopy    PRE-OPERATIVE DIAGNOSIS:   Recurrent laryngeal papillomas    POST-OPERATIVE DIAGNOSIS:   Recurrent laryngeal papillomas  Anterior and posterior laryngeal webs, mild    SURGEON: Betty Mitchell MD MPH    ANAESTHESIA: General endotracheal, laser-safe endotracheal tube    INDICATIONS FOR PROCEDURE:   Cleveland Rodríguez is a 62 year old year old male with a history of recurrent respiratory papillomatosis who was noted to have significant burden of disease in clinic. He has been treated with numerous procedures in the past. Operative intervention was recommended. After the risks, benefits, and alternatives had been discussed, the patient wished to proceed and presented for the procedure listed above.    DESCRIPTION OF PROCEDURE:   The patient was brought to the operating room and placed supine. A time-out was called to verify patient identity, operative site, and planned procedure. The operative site was prepped in the usual clean fashion. Moist gauze eye pads were placed and secured. A head wrap was placed, and a custom molded thermoplastic tooth guards was placed on the lower dentition. The patient had his own upper mouth guard that was used throughout the case. Direct laryngoscopy was performed with an Ossoff-Pilling laryngoscope, which was placed in suspension. The vocal folds were examined with 0 and 70 degree telescopes. There was extensive papillomatous disease along the bilateral true vocal folds, ventricles and false vocal folds with some sparing anteriorly on the right. Portions of the bulky disease were biopsied with a 2 and 4 mm cup forceps. These specimens were sent to Pathology for further examination.    The operating microscope was then brought into position. We then prepared to use the laser for ablation of the papillomas. Laser safety  precautions were utilized at all times, including eye protection for the patient and staff, use of wet towels, and appropriately minimized FiO2. As needed, moistened cottonoids or laser-safe backstops were used to protect the endotracheal tube from the laser. The Lumenis CO2 Accublade laser was attached to the microscope and used at a depth setting of 1 and 8 Souza.     The right false and true vocal fold papillomas were ablated and removed gradually, with care to avoid injury to the vocal ligament and to preserve some superficial lamina propria. Some of the ventricular papilloma was also ablated, although papilloma along the underside of the ventricle was not accessible for direct ablation. Once adequate removal had been achieved particularly along the right true vocal fold, we turned our attention to the left side of the larynx. The papillomas were also ablated on the left false and true vocal folds, taking care not to make two opposing raw surfaces that could increase the risk of web formation. Some of the left ventricular papilloma was also ablated. In all regions, strips of intact mucosa were preserved to reduce the risk of webbing.    The endoscope was then used to pass into the subglottis and distal airway after the cuff was deflated, to examine for any further disease. The subglottis and distal airway did appear clear down to the leobardo, where the takeoffs of the bilateral mainstem bronchi were visualized. The cuff was reinflated.    The laryngoscope was then repositioned so that the posterior glottis could be visualized. Again, significant burden of disease was encountered, and the CO2 laser was used to ablate papillomas on each side. We did leave a cuff of papilloma intact in the mid posterior aspect to reduce the risk of worsening his mild existing posterior glottic web.    Cottonoids soaked in 1:10,000 epinephrine were sparingly used to maintain hemostasis. As possible, suspension was released to  minimize injury to the tongue.    As needed during the procedure and at the conclusion of the procedure, the operating microscope was moved out of position and the 0 and 70 degree rigid endoscopes were again used to examine the vocal folds and confirm completion of the stated objectives of the procedure. After cottonoid and sponge counts were confirmed correct, 2 cc of 4% lidocaine were applied transglottically for laryngotracheal anesthesia. The laryngoscope and tooth guards were removed. The lips, teeth, and tongue were examined and no injuries were noted. Care of the patient was returned to Anesthesia.      FINDINGS:   1. Extensive papillomatous disease along the right true vocal fold extending from the mid-anterior true vocal fold to the posterior glottis. These were present along the superior and inferior aspect of the vocal fold. Additional papillomas were present on the right false cord. The ventricle was also involved. The laser was used to remove most of the papillomas with the exception of a portion of the ventricular papilloma which was not accessible due to location. The right true vocal cord did appear fibrotic laterally consistent with his history of multiple prior procedures and his burden of disease.  2. Left true vocal fold with extensive papillomas along the mid to posterior aspect. These were removed with the laser. Left false vocal fold also with posterior papilloma. Left ventricle with mild papillomatosis.  3. Additional papilloma present along the posterior glottis bilaterally. A small cuff of papillomas were left undisturbed in the posterior glottis to decrease the risk of further webbing.  4. No papillomatous disease in the subglottis or distal airway.  5. Good laryngeal exposure with Ossoff laryngoscope.    SPECIMEN(S):   1. Right false vocal fold mass  2. Left false vocal fold mass    DRAINS: None    ESTIMATED BLOOD LOSS: Minimal    COMPLICATIONS: None    DISPOSITION: Stable to  PACU    ATTENDING PRESENCE STATEMENT: I was present and participating for all portions of the procedure from beginning to completion.

## 2021-10-07 NOTE — DISCHARGE INSTRUCTIONS
"[facility]   Adult Discharge Orders & Instructions     For 24 hours after surgery    1. Get plenty of rest.  A responsible adult must stay with you for at least 24 hours after you leave the hospital.   2. Do not drive or use heavy equipment.  If you have weakness or tingling, don't drive or use heavy equipment until this feeling goes away.  3. Do not drink alcohol.  4. Avoid strenuous or risky activities.  Ask for help when climbing stairs.   5. You may feel lightheaded.  IF so, sit for a few minutes before standing.  Have someone help you get up.   6. If you have nausea (feel sick to your stomach): Drink only clear liquids such as apple juice, ginger ale, broth or 7-Up.  Rest may also help.  Be sure to drink enough fluids.  Move to a regular diet as you feel able.  7. You may have a slight fever. Call the doctor if your fever is over 100 F (37.7 C) (taken under the tongue) or lasts longer than 24 hours.  8. You may have a dry mouth, a sore throat, muscle aches or trouble sleeping.  These should go away after 24 hours.  9. Do not make important or legal decisions.   Call your doctor for any of the followin.  Signs of infection (fever, growing tenderness at the surgery site, a large amount of drainage or bleeding, severe pain, foul-smelling drainage, redness, swelling).    2. It has been over 8 to 10 hours since surgery and you are still not able to urinate (pass water).    3.  Headache for over 24 hours.    4.  Numbness, tingling or weakness the day after surgery (if you had spinal anesthesia).  To contact a doctor, call Dr Mitchell's office at [phone]    If after hours, call the hospital at [phone] and ask for the \"resident on call for Otolaryngology/ENT\"      "

## 2021-10-07 NOTE — ANESTHESIA POSTPROCEDURE EVALUATION
Patient: Cleveland Rodríguez    Procedure: Procedure(s):  Microdirect Laryngoscopy with Carbon Dioxide Laser Excision/Ablation of Laryngeal Papilloma, Biopsies       Diagnosis:Recurrent respiratory papillomatosis [Z78.9]  Dysphonia [R49.0]  Diagnosis Additional Information: No value filed.    Anesthesia Type:  General    Note:  Disposition: Outpatient   Postop Pain Control: Uneventful            Sign Out: Well controlled pain   PONV: No   Neuro/Psych: Uneventful            Sign Out: Acceptable/Baseline neuro status   Airway/Respiratory: Uneventful            Sign Out: Acceptable/Baseline resp. status   CV/Hemodynamics: Uneventful            Sign Out: Acceptable CV status   Other NRE: NONE   DID A NON-ROUTINE EVENT OCCUR? No           Last vitals:  Vitals Value Taken Time   BP 97/65 10/07/21 1200   Temp 36.9  C (98.4  F) 10/07/21 1130   Pulse 62 10/07/21 1207   Resp 12 10/07/21 1200   SpO2 96 % 10/07/21 1207   Vitals shown include unvalidated device data.    Electronically Signed By: Christopher J. Behrens, MD  October 7, 2021  1:12 PM

## 2021-10-07 NOTE — BRIEF OP NOTE
[facility] Brief Operative Note    Pre-operative diagnosis: Recurrent respiratory papillomatosis [Z78.9]  Dysphonia [R49.0]   Post-operative diagnosis As above  Anterior and posterior small laryngeal webs   Procedure: Procedure(s):  Microdirect Laryngoscopy with Carbon Dioxide Laser Excision/Ablation of Laryngeal Papilloma, Biopsies   Surgeon(s):    * Betty Mitchell MD - Primary   Estimated blood loss: <5 ml    Specimens: ID Time   1 : right false vocal fold 10/7/2021  8:06 AM   2 : left false vocal fold 10/7/2021  8:09 AM      Findings: Right false vocal fold, ventricle, and true vocal fold with heavy burden of papilloma posteriorly  Left false vocal fold, ventricle, and true vocal fold with heavy burden of papilloma posteriorly, mild burden of papilloma anterior left true vocal fold.  Small infraglottic anterior web.  Small posterior glottic web with associated papilloma. Central cuff left untreated to reduce risk of worsening web.  Subglottis, trachea, takeoffs of mainstem bronchi were patent, with no disease involvement.

## 2021-10-07 NOTE — ANESTHESIA PREPROCEDURE EVALUATION
Anesthesia Pre-Procedure Evaluation    Patient: Cleveland Rodríguez   MRN: [id number] : 1955        Preoperative Diagnosis: Recurrent respiratory papillomatosis [Z78.9]  Dysphonia [R49.0]    Procedure : Procedure(s):  Microdirect Laryngoscopy with Carbon Dioxide Laser Excision/Ablation of Laryngeal Papilloma, Biopsies, Possible dilation          Past Medical History:   Diagnosis Date     Hoarseness      Other and unspecified hyperlipidemia      Polyp of vocal cord or larynx      Rosacea      Sleep apnea      Uveitis       Past Surgical History:   Procedure Laterality Date     CATARACT IOL, RT/LT      right eye only     COLONOSCOPY  2013    Procedure: COLONOSCOPY;  COLONOSCOPY;  Surgeon: Sawyer Niño MD;  Location:  GI     LASER CO2 LARYNGOSCOPY Bilateral 10/26/2017    Procedure: LASER CO2 LARYNGOSCOPY;  Microdirect Laryngoscopy with Carbon Dioxide Laser Excision/Ablation of Laryngeal Papilloma, Biopsies;  Surgeon: Betty Mitchell MD;  Location:  OR     LASER KTP LARYNGOSCOPY EXCISE VOCAL CORD LESION Bilateral 2019    Procedure: Transnasal Laryngoscopy with KTP Laser Ablation of Recurrent Respiratory Papillomas;  Surgeon: Betty Mitchell MD;  Location:  OR     SURGICAL HISTORY OF -       about 55 vocal cord surgeries      Allergies   Allergen Reactions     Amoxicillin      Rash        Social History     Tobacco Use     Smoking status: Never Smoker     Smokeless tobacco: Never Used   Substance Use Topics     Alcohol use: Yes     Comment: social      Wt Readings from Last 1 Encounters:   10/07/21 86.6 kg (190 lb 14.7 oz)        Anesthesia Evaluation            ROS/MED HX  ENT/Pulmonary:     (+) sleep apnea,     Neurologic:       Cardiovascular:     (+) Dyslipidemia ----- (-) murmur and wheezes   METS/Exercise Tolerance:     Hematologic:       Musculoskeletal:       GI/Hepatic:       Renal/Genitourinary:       Endo:       Psychiatric/Substance Use:       Infectious  Disease:       Malignancy:       Other:            Physical Exam    Airway        Mallampati: II   TM distance: > 3 FB   Neck ROM: full   Mouth opening: > 3 cm    Respiratory Devices and Support         Dental  no notable dental history         Cardiovascular          Rhythm and rate: regular and normal (-) no systolic click and no murmur    Pulmonary   pulmonary exam normal        breath sounds clear to auscultation   (-) no wheezes        OUTSIDE LABS:  CBC:   Lab Results   Component Value Date    WBC 2.6 (L) 01/29/2021    WBC 3.2 (L) 01/20/2021    HGB 13.1 (L) 01/29/2021    HGB 13.7 01/20/2021    HCT 39.8 (L) 01/29/2021    HCT 41.1 01/20/2021     01/29/2021     (L) 01/20/2021     BMP:   Lab Results   Component Value Date     01/29/2021     01/20/2021    POTASSIUM 3.6 01/29/2021    POTASSIUM 4.7 01/20/2021    CHLORIDE 107 01/29/2021    CHLORIDE 104 01/20/2021    CO2 26 01/29/2021    CO2 28 01/20/2021    BUN 25 01/29/2021    BUN 24 01/20/2021    CR 1.08 01/29/2021    CR 1.03 01/20/2021    GLC 91 10/07/2021     (H) 01/29/2021     COAGS: No results found for: PTT, INR, FIBR  POC: No results found for: BGM, HCG, HCGS  HEPATIC:   Lab Results   Component Value Date    ALBUMIN 2.9 (L) 01/29/2021    PROTTOTAL 7.1 01/29/2021    ALT 37 01/29/2021    AST 47 (H) 01/29/2021    ALKPHOS 84 01/29/2021    BILITOTAL 0.3 01/29/2021     OTHER:   Lab Results   Component Value Date    LACT 0.9 01/29/2021    A1C 5.7 09/08/2016    PETRA 8.5 01/29/2021    LIPASE 242 01/29/2021    TSH 1.27 01/29/2021    T4 1.00 12/11/2002    CRP <5.0 06/19/2009    SED 7 08/30/2018       Anesthesia Plan    ASA Status:  2   NPO Status:  NPO Appropriate    Anesthesia Type: General.     - Airway: ETT   Induction: Intravenous.   Maintenance: Inhalation.   Techniques and Equipment:     - Airway: Video-Laryngoscope         Consents    Anesthesia Plan(s) and associated risks, benefits, and realistic alternatives discussed. Questions  answered and patient/representative(s) expressed understanding.     - Discussed with:  Patient      - Extended Intubation/Ventilatory Support Discussed: Yes.      - Patient is DNR/DNI Status: No    Use of blood products discussed: Yes.     - Discussed with: Patient.     Postoperative Care    Pain management: IV analgesics.   PONV prophylaxis: Ondansetron (or other 5HT-3), Dexamethasone or Solumedrol     Comments:                Christopher J. Behrens, MD

## 2021-10-07 NOTE — ANESTHESIA CARE TRANSFER NOTE
Patient: Cleveland Rodríguez    Procedure: Procedure(s):  Microdirect Laryngoscopy with Carbon Dioxide Laser Excision/Ablation of Laryngeal Papilloma, Biopsies       Diagnosis: Recurrent respiratory papillomatosis [Z78.9]  Dysphonia [R49.0]  Diagnosis Additional Information: No value filed.    Anesthesia Type:   General     Note:    Oropharynx: oropharynx clear of all foreign objects and spontaneously breathing  Level of Consciousness: drowsy  Oxygen Supplementation: nasal cannula    Independent Airway: airway patency satisfactory and stable  Dentition: dentition unchanged  Vital Signs Stable: post-procedure vital signs reviewed and stable  Report to RN Given: handoff report given  Patient transferred to: PACU  Comments: Patient transferred to PACU in stable condition, breathing spontaneously on NC, VSS.  Report given to RN.  Handoff Report: Identifed the Patient, Identified the Reponsible Provider, Reviewed the pertinent medical history, Discussed the surgical course, Reviewed Intra-OP anesthesia mangement and issues during anesthesia, Set expectations for post-procedure period and Allowed opportunity for questions and acknowledgement of understanding      Vitals:  Vitals Value Taken Time   BP     Temp     Pulse     Resp     SpO2 100 % 10/07/21 1037   Vitals shown include unvalidated device data.    Electronically Signed By: NEVILLE Hammond CRNA  October 7, 2021  10:38 AM

## 2021-10-07 NOTE — ANESTHESIA PROCEDURE NOTES
Airway       Patient location during procedure: OR       Procedure Start/Stop Times: 10/7/2021 7:42 AM  Staff -        Anesthesiologist:  Behrens, Christopher J, MD       CRNA: Fidel Godo APRN CRNA       Other Anesthesia Staff: Phoebe Pereyra       Performed By: SRNA  Consent for Airway        Urgency: elective  Indications and Patient Condition       Indications for airway management: mady-procedural       Induction type:intravenous       Mask difficulty assessment: 2 - vent by mask + OA or adjuvant +/- NMBA    Final Airway Details       Final airway type: endotracheal airway       Successful airway: Laser and ETT - single (TENAX laser tube)  Endotracheal Airway Details        ETT size (mm): 5.0       Cuffed: yes       Successful intubation technique: direct laryngoscopy       DL Blade Type: Morel 2       Grade View of Cords: 1       Adjucts: stylet and tooth guard       Position: Left       Measured from: lips       Bite block used: None    Post intubation assessment        Placement verified by: capnometry, equal breath sounds and chest rise        Number of attempts at approach: 1       Number of other approaches attempted: 0       Secured with: paper tape       Ease of procedure: easy       Dentition: Intact and Unchanged

## 2021-10-08 LAB
PATH REPORT.COMMENTS IMP SPEC: NORMAL
PATH REPORT.COMMENTS IMP SPEC: NORMAL
PATH REPORT.FINAL DX SPEC: NORMAL
PATH REPORT.GROSS SPEC: NORMAL
PATH REPORT.MICROSCOPIC SPEC OTHER STN: NORMAL
PATH REPORT.RELEVANT HX SPEC: NORMAL
PHOTO IMAGE: NORMAL

## 2021-10-08 PROCEDURE — 88305 TISSUE EXAM BY PATHOLOGIST: CPT | Mod: 26 | Performed by: PATHOLOGY

## 2021-10-12 ENCOUNTER — PATIENT OUTREACH (OUTPATIENT)
Dept: OTOLARYNGOLOGY | Facility: CLINIC | Age: 66
End: 2021-10-12

## 2021-10-12 NOTE — PROGRESS NOTES
ENT Post-Op Follow-Up     Responsible Attending Physician: Dr. Betty Mitchell  Date of Discharge: 10/7/21  Discharge to: Home    Current Status:  Pt is a 65 y/o male s/p Microdirect Laryngoscopy with Carbon Dioxide Laser Excision/Ablation of Laryngeal Papilloma, Biopsies  on 10/7/21.    - Reports pre-op symptoms are improved.   - Breathing: Improved, no concerns.  - Voice: Improved.  - Operative pain: Well controlled with current pain regimen.  - Ambulating: without assistance.    - Incision: N/a.    - Signs of infection: None.    - Bowel and bladder: WNL   - Sutures/staples: N/a.        Discharge instructions and medication use were reviewed. RN triage number given:  [phone] or after hours and w/e - [phone].  Patient verbalized understanding and agreement with current plan.     Follow up appointments/imaging/tests needed: Confirmed post op on 11/1/21 at 8:30 am.    Informed patient of pathology results: benign papilloma. Patient happy to hear this.    Kat Esparza RN on 10/12/2021 at 3:09 PM

## 2021-11-01 ENCOUNTER — OFFICE VISIT (OUTPATIENT)
Dept: OTOLARYNGOLOGY | Facility: CLINIC | Age: 66
End: 2021-11-01
Payer: COMMERCIAL

## 2021-11-01 ENCOUNTER — TELEPHONE (OUTPATIENT)
Dept: OTOLARYNGOLOGY | Facility: CLINIC | Age: 66
End: 2021-11-01

## 2021-11-01 VITALS
BODY MASS INDEX: 24.51 KG/M2 | WEIGHT: 191 LBS | HEART RATE: 76 BPM | TEMPERATURE: 99.1 F | OXYGEN SATURATION: 99 % | HEIGHT: 74 IN

## 2021-11-01 DIAGNOSIS — R41.3 MEMORY LOSS: ICD-10-CM

## 2021-11-01 DIAGNOSIS — R49.0 DYSPHONIA: Primary | ICD-10-CM

## 2021-11-01 DIAGNOSIS — Z78.9 RECURRENT RESPIRATORY PAPILLOMATOSIS: ICD-10-CM

## 2021-11-01 PROCEDURE — 99212 OFFICE O/P EST SF 10 MIN: CPT | Mod: 25 | Performed by: OTOLARYNGOLOGY

## 2021-11-01 PROCEDURE — 31579 LARYNGOSCOPY TELESCOPIC: CPT | Performed by: OTOLARYNGOLOGY

## 2021-11-01 ASSESSMENT — MIFFLIN-ST. JEOR: SCORE: 1706.12

## 2021-11-01 ASSESSMENT — PAIN SCALES - GENERAL: PAINLEVEL: NO PAIN (0)

## 2021-11-01 NOTE — NURSING NOTE
"Chief Complaint   Patient presents with     RECHECK     3 week post op       Pulse 76, temperature 99.1  F (37.3  C), temperature source Temporal, height 1.88 m (6' 2\"), weight 86.6 kg (191 lb), SpO2 99 %.    Ventura Quijano, EMT  "

## 2021-11-01 NOTE — PATIENT INSTRUCTIONS
1.  You were seen in the ENT Clinic today by . If you have any questions or concerns after your appointment, please call [phone]. Press option #1 for scheduling related needs. Press option #3 for Nurse advice.    2.  Plan is to return to clinic in 6 months      Shey Rubio LPN  [phone]  [facility] - Otolaryngology

## 2021-11-01 NOTE — LETTER
11/1/2021      RE: Cleveland Rodríguez  [address] [place] [address]       Dear Colleague:    Cleveland Rodríguez recently returned for follow-up at the [facility]. My clinic note from our visit is enclosed below.  Speech recognition software may have been used in the documentation below; input is reviewed before signature to the best of my ability.     I appreciate the ongoing opportunity to participate in this patient's care.    Please feel free to contact me with any questions.    Sincerely yours,      Betty Mitchell M.D., M.P.H.  , Laryngology  Director, [facility]  Otolaryngology- Head & Neck Surgery  [phone]      =====  HISTORY OF PRESENT ILLNESS:  Cleveland Rodríguez is a pleasant 66-year-old male with recurrent respiratory papillomatosis (RRP), HPV6+, as well as mild cognitive impairment, who returns in follow up today. He has a history of numerous operating room procedures for this.     His most recent OR procedure was 10/7/21. Pathology showed benign squamous papilloma.    His most recent procedure was an awake KTP laryngoscopy in 09/06/2019.     He returns for a post-op check. He feels his voice is doing fine.    He does report ongoing workup and treatment planning related to his memory loss.      MEDICATIONS:     Current Outpatient Medications   Medication Sig Dispense Refill     aspirin 81 MG tablet Take 1 tablet by mouth daily        atorvastatin (LIPITOR) 20 MG tablet Take 20 mg by mouth       atorvastatin (LIPITOR) 20 MG tablet TAKE 1 TABLET BY MOUTH EVERY DAY 90 tablet 3     donepezil (ARICEPT) 10 MG tablet At Bedtime        doxycycline hyclate (VIBRAMYCIN) 100 MG capsule Take 1 capsule (100 mg) by mouth 2 times daily 5 capsule 0     GARLIC OIL PO Take by mouth daily        KRILL OIL PO Take by mouth daily        metroNIDAZOLE (METROGEL) 1 % gel Apply 1 g topically daily apply hs for Rosacea 60 g 11     Multiple Vitamin  (DAILY MULTIVITAMIN PO) Take  by mouth 2 times daily.       Omega-3 Fatty Acids (OMEGA-3 FISH OIL PO) Take by mouth daily          ALLERGIES:    Allergies   Allergen Reactions     Amoxicillin      Rash         NEW PMH/PSH: None    REVIEW OF SYSTEMS:  The patient completed a comprehensive 11 point review of systems (below), which was reviewed. Positives are as noted below.  Patient Supplied Answers to Review of Systems   ENT ROS 7/29/2019   Constitutional -   Ears, Nose, Throat -   Cardiopulmonary -   Musculoskeletal -   Endocrine Frequent urination         PHYSICAL EXAM:  General: The patient was alert and conversant, and in no acute distress.    Oral cavity/oropharynx: No masses or lesions. Dentition unchanged since prior. Tongue mobility and palate elevation intact and symmetric.  Neck: No palpable cervical lymphadenopathy, mild tenderness to palpation of the thyrohyoid space, which was narrow. No obvious thyroid abnormality.  Resp: Breathing comfortably, no stridor or stertor.  Neuro: Symmetric facial function. Other cranial nerve function as documented above.  Psych: Normal affect, pleasant and cooperative.  Voice/speech: Mild dysphonia characterized by breathiness and roughness.      Intake scores  Last 2 Scores for Patient-Answered VHI Questionnaire  VHI Total Score 1/21/2019 7/29/2019   VHI Total Score 9 Incomplete      Last 2 Scores for Patient-Answered EAT Questionnaire  EAT Total Score 11/13/2017 10/19/2018   EAT Total Score 1 0      Last 2 Scores for Patient-Answered CSI Questionnaire  CSI Total Score 11/13/2017 10/19/2018   CSI Total Score 1 4         Procedure:   Flexible fiberoptic laryngoscopy and laryngovideostroboscopy  Indications: This procedure was warranted to evaluate the patient's laryngeal anatomy and function. Risks, benefits, and alternatives were discussed.  Description: After written informed consent was obtained, a time-out was performed to confirm patient identity, procedure, and  procedure site. Topical 3% lidocaine with 0.25% phenylephrine was applied to the nasal cavities. I performed the endoscopy and no complications were apparent. Continuous and stroboscopic light were utilized to assess routine phonation and variable frequency phonation.  Performed by: Betty Mitchell MD MPH  Findings: Normal nasopharynx. Normal base of tongue, valleculae, and epiglottis. Vocal fold mobility: right: normal; left: normal. Medial edges of the vocal folds: smooth and straight overall, stable medium anterior infraglottic web. Small left posterior infraglottic prominence.   Glissade was assessed on a limited basis due to limited pitch awareness. There was mild to moderate supraglottic recruitment with connected speech. Mucosa of false vocal folds, aryepiglottic folds, piriform sinuses, and posterior glottis stable overall with substantially reduced burden of papilloma bilateral glottis and supraglottis. Stable posterior laryngeal webbing. Airway was patent.   Similar findings on NBI.    The addition of stroboscopy allowed evaluation of the mucosal wave.   Amplitude: right: mildly decreased; left: mildly decreased. Symmetry: intermittent symmetry. Closure pattern: complete. Closure plane: at glottic level. Phase distribution: normal.                          IMPRESSION AND PLAN:   Cleveland Rodríguez returns with good post-operative improvement. He is pleased with his voice and expressed appreciation for my help. We agreed to have follow up in six months or sooner as needed. He states that although he is having some memory challenges, his wife is able to help him monitor his voice symptoms as well. She is welcome to join our visits in the future, if this would be helpful.    He incidentally reported at the end of the visit that he is having some issues with post-nasal drainage and nasal congestion, possible allergies. He has not found antihistamines helpful. We discussed that he could consider trying  Flonase or generic equivalent, over the counter, to see if that helps.    Today's visit required additional screening time, PPE, and cleaning measures to allow for a safe in-person visit, due to the public health emergency. I spent a total of 15 minutes on 11/1/2021 in chart review, review of tests, patient visit, documentation, care coordination, and/or discussion with other providers about the issues documented above, separate from any documented procedure(s).        Betty Mitchell MD

## 2021-11-01 NOTE — PROGRESS NOTES
Dear Colleague:    Cleveland Rodríguez recently returned for follow-up at the [facility]. My clinic note from our visit is enclosed below.  Speech recognition software may have been used in the documentation below; input is reviewed before signature to the best of my ability.     I appreciate the ongoing opportunity to participate in this patient's care.    Please feel free to contact me with any questions.    Sincerely yours,      Betty Mitchell M.D., M.P.H.  , Laryngology  Director, [facility]  Otolaryngology- Head & Neck Surgery  [phone]      =====  HISTORY OF PRESENT ILLNESS:  Cleveland Rodríguez is a pleasant 66-year-old male with recurrent respiratory papillomatosis (RRP), HPV6+, as well as mild cognitive impairment, who returns in follow up today. He has a history of numerous operating room procedures for this.     His most recent OR procedure was 10/7/21. Pathology showed benign squamous papilloma.    His most recent procedure was an awake KTP laryngoscopy in 09/06/2019.     He returns for a post-op check. He feels his voice is doing fine.    He does report ongoing workup and treatment planning related to his memory loss.      MEDICATIONS:     Current Outpatient Medications   Medication Sig Dispense Refill     aspirin 81 MG tablet Take 1 tablet by mouth daily        atorvastatin (LIPITOR) 20 MG tablet Take 20 mg by mouth       atorvastatin (LIPITOR) 20 MG tablet TAKE 1 TABLET BY MOUTH EVERY DAY 90 tablet 3     donepezil (ARICEPT) 10 MG tablet At Bedtime        doxycycline hyclate (VIBRAMYCIN) 100 MG capsule Take 1 capsule (100 mg) by mouth 2 times daily 5 capsule 0     GARLIC OIL PO Take by mouth daily        KRILL OIL PO Take by mouth daily        metroNIDAZOLE (METROGEL) 1 % gel Apply 1 g topically daily apply hs for Rosacea 60 g 11     Multiple Vitamin (DAILY MULTIVITAMIN PO) Take  by mouth 2 times daily.       Omega-3 Fatty Acids  (OMEGA-3 FISH OIL PO) Take by mouth daily          ALLERGIES:    Allergies   Allergen Reactions     Amoxicillin      Rash         NEW PMH/PSH: None    REVIEW OF SYSTEMS:  The patient completed a comprehensive 11 point review of systems (below), which was reviewed. Positives are as noted below.  Patient Supplied Answers to Review of Systems   ENT ROS 7/29/2019   Constitutional -   Ears, Nose, Throat -   Cardiopulmonary -   Musculoskeletal -   Endocrine Frequent urination         PHYSICAL EXAM:  General: The patient was alert and conversant, and in no acute distress.    Oral cavity/oropharynx: No masses or lesions. Dentition unchanged since prior. Tongue mobility and palate elevation intact and symmetric.  Neck: No palpable cervical lymphadenopathy, mild tenderness to palpation of the thyrohyoid space, which was narrow. No obvious thyroid abnormality.  Resp: Breathing comfortably, no stridor or stertor.  Neuro: Symmetric facial function. Other cranial nerve function as documented above.  Psych: Normal affect, pleasant and cooperative.  Voice/speech: Mild dysphonia characterized by breathiness and roughness.      Intake scores  Last 2 Scores for Patient-Answered VHI Questionnaire  VHI Total Score 1/21/2019 7/29/2019   VHI Total Score 9 Incomplete      Last 2 Scores for Patient-Answered EAT Questionnaire  EAT Total Score 11/13/2017 10/19/2018   EAT Total Score 1 0      Last 2 Scores for Patient-Answered CSI Questionnaire  CSI Total Score 11/13/2017 10/19/2018   CSI Total Score 1 4         Procedure:   Flexible fiberoptic laryngoscopy and laryngovideostroboscopy  Indications: This procedure was warranted to evaluate the patient's laryngeal anatomy and function. Risks, benefits, and alternatives were discussed.  Description: After written informed consent was obtained, a time-out was performed to confirm patient identity, procedure, and procedure site. Topical 3% lidocaine with 0.25% phenylephrine was applied to the  nasal cavities. I performed the endoscopy and no complications were apparent. Continuous and stroboscopic light were utilized to assess routine phonation and variable frequency phonation.  Performed by: Betty Mitchell MD MPH  Findings: Normal nasopharynx. Normal base of tongue, valleculae, and epiglottis. Vocal fold mobility: right: normal; left: normal. Medial edges of the vocal folds: smooth and straight overall, stable medium anterior infraglottic web. Small left posterior infraglottic prominence.   Glissade was assessed on a limited basis due to limited pitch awareness. There was mild to moderate supraglottic recruitment with connected speech. Mucosa of false vocal folds, aryepiglottic folds, piriform sinuses, and posterior glottis stable overall with substantially reduced burden of papilloma bilateral glottis and supraglottis. Stable posterior laryngeal webbing. Airway was patent.   Similar findings on NBI.    The addition of stroboscopy allowed evaluation of the mucosal wave.   Amplitude: right: mildly decreased; left: mildly decreased. Symmetry: intermittent symmetry. Closure pattern: complete. Closure plane: at glottic level. Phase distribution: normal.                          IMPRESSION AND PLAN:   Cleveland Rodríguez returns with good post-operative improvement. He is pleased with his voice and expressed appreciation for my help. We agreed to have follow up in six months or sooner as needed. He states that although he is having some memory challenges, his wife is able to help him monitor his voice symptoms as well. She is welcome to join our visits in the future, if this would be helpful.    He incidentally reported at the end of the visit that he is having some issues with post-nasal drainage and nasal congestion, possible allergies. He has not found antihistamines helpful. We discussed that he could consider trying Flonase or generic equivalent, over the counter, to see if that helps.    Today's  visit required additional screening time, PPE, and cleaning measures to allow for a safe in-person visit, due to the public health emergency. I spent a total of 15 minutes on 11/1/2021 in chart review, review of tests, patient visit, documentation, care coordination, and/or discussion with other providers about the issues documented above, separate from any documented procedure(s).

## 2021-11-03 ENCOUNTER — TELEPHONE (OUTPATIENT)
Dept: OTOLARYNGOLOGY | Facility: CLINIC | Age: 66
End: 2021-11-03

## 2021-11-03 NOTE — TELEPHONE ENCOUNTER
[facility] Call Center    Phone Message    May a detailed message be left on voicemail: yes     Reason for Call: Other: Pt wants a call back regarding Allergy Medication Dr. Mitchell recommended. Please call the Pt with the brand name. Pt said OK to [facility]. Thanks.      Action Taken: Message routed to:  [facility] ENT    Travel Screening: Not Applicable

## 2021-11-04 NOTE — TELEPHONE ENCOUNTER
Left vm message for patient with name of medication and instructions as per provider. Writers call back number provided in case of any further questions.

## 2023-06-13 ENCOUNTER — TELEPHONE (OUTPATIENT)
Dept: OTOLARYNGOLOGY | Facility: CLINIC | Age: 68
End: 2023-06-13
Payer: COMMERCIAL

## 2023-06-13 NOTE — TELEPHONE ENCOUNTER
Left vm message for patient in regards to switching appt time for upcoming appt. Writers call back number provided on vm

## 2023-06-19 ENCOUNTER — OFFICE VISIT (OUTPATIENT)
Dept: OTOLARYNGOLOGY | Facility: CLINIC | Age: 68
End: 2023-06-19
Payer: COMMERCIAL

## 2023-06-19 VITALS
HEIGHT: 75 IN | WEIGHT: 190 LBS | HEART RATE: 76 BPM | SYSTOLIC BLOOD PRESSURE: 104 MMHG | BODY MASS INDEX: 23.62 KG/M2 | OXYGEN SATURATION: 97 % | DIASTOLIC BLOOD PRESSURE: 70 MMHG

## 2023-06-19 DIAGNOSIS — F02.80 LEWY BODY DEMENTIA, UNSPECIFIED DEMENTIA SEVERITY, UNSPECIFIED WHETHER BEHAVIORAL, PSYCHOTIC, OR MOOD DISTURBANCE OR ANXIETY (H): ICD-10-CM

## 2023-06-19 DIAGNOSIS — Z78.9 RECURRENT RESPIRATORY PAPILLOMATOSIS: ICD-10-CM

## 2023-06-19 DIAGNOSIS — R49.0 DYSPHONIA: Primary | ICD-10-CM

## 2023-06-19 DIAGNOSIS — G31.83 LEWY BODY DEMENTIA, UNSPECIFIED DEMENTIA SEVERITY, UNSPECIFIED WHETHER BEHAVIORAL, PSYCHOTIC, OR MOOD DISTURBANCE OR ANXIETY (H): ICD-10-CM

## 2023-06-19 PROCEDURE — 99214 OFFICE O/P EST MOD 30 MIN: CPT | Mod: 25 | Performed by: OTOLARYNGOLOGY

## 2023-06-19 PROCEDURE — 31579 LARYNGOSCOPY TELESCOPIC: CPT | Performed by: OTOLARYNGOLOGY

## 2023-06-19 RX ORDER — NORTRIPTYLINE HCL 10 MG
10 CAPSULE ORAL AT BEDTIME
COMMUNITY
Start: 2023-05-15

## 2023-06-19 RX ORDER — QUETIAPINE FUMARATE 25 MG/1
25 TABLET, FILM COATED ORAL DAILY
COMMUNITY
Start: 2023-06-16

## 2023-06-19 RX ORDER — ESCITALOPRAM OXALATE 20 MG/1
20 TABLET ORAL DAILY
COMMUNITY
Start: 2023-04-06

## 2023-06-19 ASSESSMENT — PAIN SCALES - GENERAL: PAINLEVEL: NO PAIN (0)

## 2023-06-19 NOTE — LETTER
6/19/2023      RE: Cleveland Rodríguez  [address] [place] [address]       Dear Colleague:  Cleveland Rodríguez recently returned for follow-up at the [facility]. My clinic note from our visit is enclosed below.  Speech recognition software may have been used in the documentation below; input is reviewed before signature to the best of my ability.     I appreciate the ongoing opportunity to participate in this patient's care.    Please feel free to contact me with any questions.      Sincerely yours,  Betty Mitchell M.D., M.P.H.  , Laryngology  Director, [facility]  Otolaryngology- Head & Neck Surgery  [phone]            =====  HISTORY OF PRESENT ILLNESS:  Cleveland Rodríguez is a pleasant 67 year old male with recurrent respiratory papillomatosis (RRP), HPV6+, as well as mild cognitive impairment (Lewy body dementia), who returns in follow up today. He has a history of numerous operating room procedures for this.     His most recent OR procedure was 10/7/21. Pathology showed benign squamous papilloma.    His most recent procedure under local anesthesia was an awake KTP laryngoscopy in 09/06/2019.     Last seen Nov 2021.    Today's updates:  - Voice comes and goes sometimes. More difficult to project. Not necessarily worse with use, but worse when he is fatigued.  - Eating and drinking okay  - Breathing ok; does snore at night, but his wife has not noticed any pauses in his breathing  - Was diagnosed with Lewy body dementia, has family history of this and his brother is affected as well.  - Working two days a week now so vocal demand has decreased.        MEDICATIONS:     Current Outpatient Medications   Medication Sig Dispense Refill    aspirin 81 MG tablet Take 1 tablet by mouth daily       atorvastatin (LIPITOR) 20 MG tablet Take 20 mg by mouth      atorvastatin (LIPITOR) 20 MG tablet TAKE 1 TABLET BY MOUTH EVERY DAY 90 tablet 3     donepezil (ARICEPT) 10 MG tablet At Bedtime       doxycycline hyclate (VIBRAMYCIN) 100 MG capsule Take 1 capsule (100 mg) by mouth 2 times daily 5 capsule 0    escitalopram (LEXAPRO) 20 MG tablet       GARLIC OIL PO Take by mouth daily       KRILL OIL PO Take by mouth daily       metroNIDAZOLE (METROGEL) 1 % gel Apply 1 g topically daily apply hs for Rosacea 60 g 11    Multiple Vitamin (DAILY MULTIVITAMIN PO) Take  by mouth 2 times daily.      nortriptyline (PAMELOR) 10 MG capsule       Omega-3 Fatty Acids (OMEGA-3 FISH OIL PO) Take by mouth daily       QUEtiapine (SEROQUEL) 25 MG tablet          ALLERGIES:    Allergies   Allergen Reactions    Amoxicillin Hives              NEW PMH/PSH: As above    REVIEW OF SYSTEMS:  The patient completed a comprehensive 11 point review of systems (below), which was reviewed. Positives are as noted below.  Patient Supplied Answers to Review of Systems      6/19/2023     6:51 AM    ENT ROS   Neurology Headache   Psychology Frequently feeling anxious   Gastrointestinal/Genitourinary Diarrhea   Allergy/Immunology Allergies or hay fever    Rash   Skin Skin lesions         PHYSICAL EXAM:  General: The patient was alert and conversant, and in no acute distress. Patient accompanied by his spouse.  Oral cavity/oropharynx: No masses or lesions. Dentition unchanged since prior. Tongue mobility and palate elevation intact and symmetric.  Neck: No palpable cervical lymphadenopathy, no significant tenderness to palpation of the thyrohyoid space, which was narrow. No obvious thyroid abnormality.  Resp: Breathing comfortably, no stridor or stertor.  Neuro: Symmetric facial function. Other cranial nerve function as documented above.  Psych: Normal affect, pleasant and cooperative.  Voice/speech: Mild dysphonia characterized by breathiness, roughness and backwards resonance.      Intake scores  Last 2 Scores for Patient-Answered VHI Questionnaire      7/29/2019     6:35 AM 6/19/2023     6:52 AM    VHI Total Score   VHI Total Score Incomplete 26      Last 2 Scores for Patient-Answered EAT Questionnaire      11/13/2017    12:58 PM 10/19/2018     9:12 AM   EAT Total Score   EAT Total Score 1 0        Last 2 Scores for Patient-Answered CSI Questionnaire      11/13/2017    12:59 PM 10/19/2018     9:10 AM   CSI Total Score   CSI Total Score 1 4           Procedure:   Flexible fiberoptic laryngoscopy and laryngovideostroboscopy  Indications: This procedure was warranted to evaluate the patient's laryngeal anatomy and function. Risks, benefits, and alternatives were discussed.  Description: After written informed consent was obtained, a time-out was performed to confirm patient identity, procedure, and procedure site. Topical 3% lidocaine with 0.25% phenylephrine was applied to the nasal cavities. I performed the endoscopy and no complications were apparent. Continuous and stroboscopic light were utilized to assess routine phonation and variable frequency phonation.  Performed by: Betty Mitchell MD MPH  Findings: Normal nasopharynx. Normal base of tongue, valleculae, and epiglottis. Vocal fold mobility: right: normal; left: normal. Medial edges of the true vocal folds: smooth and straight. Bilateral true vocal folds with papilloma on superior surface, left > right. 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 Glissade was not assessable due to difficulty completing tasks.  Mucosa of false vocal folds, aryepiglottic folds, piriform sinuses, and posterior glottis unremarkable with the exception of some non-obstructive right AE fold/supraglottic papilloma. Airway was patent.   Similar findings on NBI.    The addition of stroboscopy allowed evaluation of the mucosal wave.   Amplitude: right: mildly decreased; left: moderately decreased. Symmetry: associated asymmetry. Closure pattern: complete. Closure plane: at glottic level. Phase distribution: normal.                        IMPRESSION AND PLAN:    Cleveland Rodríguez returns with some interval regrowth of papilloma, as expected. He is noticing some vocal limitations but is not very bothered by them, and thus is not necessarily in a rush to repeat surgery. We discussed that in that case we should have a repeat exam after an interval to assess stability, and to make sure we are ready to intervene before the anterior commissure is substantially involved.    RTC 3 months for interval check, or sooner as needed.  I asked him to let us know if he notes any significant vocal change prior to that time, and if that occurs we will want to plan a repeat microDL sooner. He and his wife were comfortable with that plan.    I spent a total of 35 minutes on 6/19/2023 in chart review, review of tests, patient visit, documentation, care coordination, and/or discussion with other providers about the issues documented above, separate from any documented procedure(s).        Betty Mitchell MD

## 2023-06-19 NOTE — PROGRESS NOTES
Dear Colleague:  Cleveland Rodríguez recently returned for follow-up at the [facility]. My clinic note from our visit is enclosed below.  Speech recognition software may have been used in the documentation below; input is reviewed before signature to the best of my ability.     I appreciate the ongoing opportunity to participate in this patient's care.    Please feel free to contact me with any questions.      Sincerely yours,  Betty Mitchell M.D., M.P.H.  , Laryngology  Director, [facility]  Otolaryngology- Head & Neck Surgery  [phone]            =====  HISTORY OF PRESENT ILLNESS:  Cleveland Rodríguez is a pleasant 67 year old male with recurrent respiratory papillomatosis (RRP), HPV6+, as well as mild cognitive impairment (Lewy body dementia), who returns in follow up today. He has a history of numerous operating room procedures for this.     His most recent OR procedure was 10/7/21. Pathology showed benign squamous papilloma.    His most recent procedure under local anesthesia was an awake KTP laryngoscopy in 09/06/2019.     Last seen Nov 2021.    Today's updates:  - Voice comes and goes sometimes. More difficult to project. Not necessarily worse with use, but worse when he is fatigued.  - Eating and drinking okay  - Breathing ok; does snore at night, but his wife has not noticed any pauses in his breathing  - Was diagnosed with Lewy body dementia, has family history of this and his brother is affected as well.  - Working two days a week now so vocal demand has decreased.        MEDICATIONS:     Current Outpatient Medications   Medication Sig Dispense Refill     aspirin 81 MG tablet Take 1 tablet by mouth daily        atorvastatin (LIPITOR) 20 MG tablet Take 20 mg by mouth       atorvastatin (LIPITOR) 20 MG tablet TAKE 1 TABLET BY MOUTH EVERY DAY 90 tablet 3     donepezil (ARICEPT) 10 MG tablet At Bedtime        doxycycline hyclate (VIBRAMYCIN)  100 MG capsule Take 1 capsule (100 mg) by mouth 2 times daily 5 capsule 0     escitalopram (LEXAPRO) 20 MG tablet        GARLIC OIL PO Take by mouth daily        KRILL OIL PO Take by mouth daily        metroNIDAZOLE (METROGEL) 1 % gel Apply 1 g topically daily apply hs for Rosacea 60 g 11     Multiple Vitamin (DAILY MULTIVITAMIN PO) Take  by mouth 2 times daily.       nortriptyline (PAMELOR) 10 MG capsule        Omega-3 Fatty Acids (OMEGA-3 FISH OIL PO) Take by mouth daily        QUEtiapine (SEROQUEL) 25 MG tablet          ALLERGIES:    Allergies   Allergen Reactions     Amoxicillin Hives              NEW PMH/PSH: As above    REVIEW OF SYSTEMS:  The patient completed a comprehensive 11 point review of systems (below), which was reviewed. Positives are as noted below.  Patient Supplied Answers to Review of Systems      6/19/2023     6:51 AM    ENT ROS   Neurology Headache   Psychology Frequently feeling anxious   Gastrointestinal/Genitourinary Diarrhea   Allergy/Immunology Allergies or hay fever    Rash   Skin Skin lesions         PHYSICAL EXAM:  General: The patient was alert and conversant, and in no acute distress. Patient accompanied by his spouse.  Oral cavity/oropharynx: No masses or lesions. Dentition unchanged since prior. Tongue mobility and palate elevation intact and symmetric.  Neck: No palpable cervical lymphadenopathy, no significant tenderness to palpation of the thyrohyoid space, which was narrow. No obvious thyroid abnormality.  Resp: Breathing comfortably, no stridor or stertor.  Neuro: Symmetric facial function. Other cranial nerve function as documented above.  Psych: Normal affect, pleasant and cooperative.  Voice/speech: Mild dysphonia characterized by breathiness, roughness and backwards resonance.      Intake scores  Last 2 Scores for Patient-Answered VHI Questionnaire      7/29/2019     6:35 AM 6/19/2023     6:52 AM   VHI Total Score   VHI Total Score Incomplete 26      Last 2 Scores for  Patient-Answered EAT Questionnaire      11/13/2017    12:58 PM 10/19/2018     9:12 AM   EAT Total Score   EAT Total Score 1 0        Last 2 Scores for Patient-Answered CSI Questionnaire      11/13/2017    12:59 PM 10/19/2018     9:10 AM   CSI Total Score   CSI Total Score 1 4           Procedure:   Flexible fiberoptic laryngoscopy and laryngovideostroboscopy  Indications: This procedure was warranted to evaluate the patient's laryngeal anatomy and function. Risks, benefits, and alternatives were discussed.  Description: After written informed consent was obtained, a time-out was performed to confirm patient identity, procedure, and procedure site. Topical 3% lidocaine with 0.25% phenylephrine was applied to the nasal cavities. I performed the endoscopy and no complications were apparent. Continuous and stroboscopic light were utilized to assess routine phonation and variable frequency phonation.  Performed by: Betty Mitchell MD MPH  Findings: Normal nasopharynx. Normal base of tongue, valleculae, and epiglottis. Vocal fold mobility: right: normal; left: normal. Medial edges of the true vocal folds: smooth and straight. Bilateral true vocal folds with papilloma on superior surface, left > right. 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 Glissade was not assessable due to difficulty completing tasks.  Mucosa of false vocal folds, aryepiglottic folds, piriform sinuses, and posterior glottis unremarkable with the exception of some non-obstructive right AE fold/supraglottic papilloma. Airway was patent.   Similar findings on NBI.    The addition of stroboscopy allowed evaluation of the mucosal wave.   Amplitude: right: mildly decreased; left: moderately decreased. Symmetry: associated asymmetry. Closure pattern: complete. Closure plane: at glottic level. Phase distribution: normal.                        IMPRESSION AND PLAN:   Cleveland ROULA Rodríguez returns with some interval regrowth of papilloma, as  expected. He is noticing some vocal limitations but is not very bothered by them, and thus is not necessarily in a rush to repeat surgery. We discussed that in that case we should have a repeat exam after an interval to assess stability, and to make sure we are ready to intervene before the anterior commissure is substantially involved.    RTC 3 months for interval check, or sooner as needed.  I asked him to let us know if he notes any significant vocal change prior to that time, and if that occurs we will want to plan a repeat microDL sooner. He and his wife were comfortable with that plan.    I spent a total of 35 minutes on 6/19/2023 in chart review, review of tests, patient visit, documentation, care coordination, and/or discussion with other providers about the issues documented above, separate from any documented procedure(s).

## 2023-06-19 NOTE — PATIENT INSTRUCTIONS
1.  You were seen in the ENT Clinic today by Dr. Mitchell. If you have any questions or concerns after your appointment, please call [phone]. Press option #1 for scheduling related needs. Press option #3 for Nurse advice.    2.  Plan is to return to clinic three months      Magui Mar RN  [phone]  [facility] - Otolaryngology

## 2023-09-18 ENCOUNTER — OFFICE VISIT (OUTPATIENT)
Dept: OTOLARYNGOLOGY | Facility: CLINIC | Age: 68
End: 2023-09-18
Payer: COMMERCIAL

## 2023-09-18 VITALS — SYSTOLIC BLOOD PRESSURE: 105 MMHG | DIASTOLIC BLOOD PRESSURE: 67 MMHG | HEART RATE: 100 BPM

## 2023-09-18 DIAGNOSIS — F02.80 LEWY BODY DEMENTIA, UNSPECIFIED DEMENTIA SEVERITY, UNSPECIFIED WHETHER BEHAVIORAL, PSYCHOTIC, OR MOOD DISTURBANCE OR ANXIETY (H): ICD-10-CM

## 2023-09-18 DIAGNOSIS — R49.0 DYSPHONIA: Primary | ICD-10-CM

## 2023-09-18 DIAGNOSIS — G31.83 LEWY BODY DEMENTIA, UNSPECIFIED DEMENTIA SEVERITY, UNSPECIFIED WHETHER BEHAVIORAL, PSYCHOTIC, OR MOOD DISTURBANCE OR ANXIETY (H): ICD-10-CM

## 2023-09-18 DIAGNOSIS — Z78.9 RECURRENT RESPIRATORY PAPILLOMATOSIS: ICD-10-CM

## 2023-09-18 PROCEDURE — 31579 LARYNGOSCOPY TELESCOPIC: CPT | Performed by: OTOLARYNGOLOGY

## 2023-09-18 PROCEDURE — 99214 OFFICE O/P EST MOD 30 MIN: CPT | Mod: 25 | Performed by: OTOLARYNGOLOGY

## 2023-09-18 ASSESSMENT — PAIN SCALES - GENERAL: PAINLEVEL: NO PAIN (0)

## 2023-09-18 NOTE — LETTER
9/18/2023      RE: Cleveland Rodríguez  [address] [place] [address]       Dear Colleague:  Cleveland Rodríguez recently returned for follow-up at the [facility]. My clinic note from our visit is enclosed below.  Speech recognition software may have been used in the documentation below; input is reviewed before signature to the best of my ability.     I appreciate the ongoing opportunity to participate in this patient's care.    Please feel free to contact me with any questions.      Sincerely yours,  Betty Mitchell M.D., M.P.H.  , Laryngology  Director, [facility]  Otolaryngology- Head & Neck Surgery  [phone]            =====  HISTORY OF PRESENT ILLNESS:  Cleveland Rodríguez is a pleasant 67 year old male with recurrent respiratory papillomatosis (RRP), HPV6+, as well as mild cognitive impairment (Lewy body dementia), who returns in follow up today. He has a history of numerous operating room procedures for this.     His most recent OR procedure was 10/7/21. Pathology showed benign squamous papilloma.  His most recent procedure under local anesthesia was an awake KTP laryngoscopy in 09/06/2019.     Today's updates:  - voice is variable  - swallowing and breathing feel fine, but wife notes that he has some heavy breathing while sleeping  - no new PMH/PSH        MEDICATIONS:     Current Outpatient Medications   Medication Sig Dispense Refill    aspirin 81 MG tablet Take 1 tablet by mouth daily       atorvastatin (LIPITOR) 20 MG tablet Take 20 mg by mouth      atorvastatin (LIPITOR) 20 MG tablet TAKE 1 TABLET BY MOUTH EVERY DAY 90 tablet 3    donepezil (ARICEPT) 10 MG tablet At Bedtime       doxycycline hyclate (VIBRAMYCIN) 100 MG capsule Take 1 capsule (100 mg) by mouth 2 times daily 5 capsule 0    escitalopram (LEXAPRO) 20 MG tablet       GARLIC OIL PO Take by mouth daily       KRILL OIL PO Take by mouth daily       metroNIDAZOLE (METROGEL) 1  % gel Apply 1 g topically daily apply hs for Rosacea 60 g 11    Multiple Vitamin (DAILY MULTIVITAMIN PO) Take  by mouth 2 times daily.      nortriptyline (PAMELOR) 10 MG capsule       Omega-3 Fatty Acids (OMEGA-3 FISH OIL PO) Take by mouth daily       QUEtiapine (SEROQUEL) 25 MG tablet          ALLERGIES:    Allergies   Allergen Reactions    Amoxicillin Hives              NEW PMH/PSH: None    REVIEW OF SYSTEMS:  The patient completed a comprehensive 11 point review of systems (below), which was reviewed. Positives are as noted below.  Patient Supplied Answers to Review of Systems      9/18/2023     6:55 AM    ENT ROS   Constitutional Appetite change    Unexplained fatigue    Problems with sleep   Neurology Headache   Psychology Frequently feeling anxious   Ears, Nose, Throat Nasal congestion or drainage    Hoarseness   Cardiopulmonary Chest pain   Gastrointestinal/Genitourinary Heartburn/indigestion    Diarrhea   Musculoskeletal Sore or stiff joints   Allergy/Immunology Allergies or hay fever   Endocrine Frequent urination         PHYSICAL EXAM:  General: The patient was alert and conversant, and in no acute distress. Patient accompanied by his spouse.  Neck: No palpable cervical lymphadenopathy, no significant tenderness to palpation of the thyrohyoid space, which was narrow. No obvious thyroid abnormality.  Resp: Breathing comfortably, no stridor or stertor.  Neuro: Symmetric facial function. Other cranial nerve function as documented above.  Psych: Normal affect, pleasant and cooperative.  Voice/speech: Mild dysphonia characterized by intermittent breathiness and roughness.      Intake scores  Last 2 Scores for Patient-Answered VHI Questionnaire      6/19/2023     7:08 AM 9/18/2023     7:06 AM   VHI Total Score   VHI Total Score 26 20      Last 2 Scores for Patient-Answered CSI Questionnaire      6/19/2023     7:08 AM 9/18/2023     7:06 AM   CSI Total Score   CSI Total Score 9 16       Procedure:   Flexible  fiberoptic laryngoscopy and laryngovideostroboscopy  Indications: This procedure was warranted to evaluate the patient's laryngeal anatomy and function. Risks, benefits, and alternatives were discussed.  Description: After written informed consent was obtained, a time-out was performed to confirm patient identity, procedure, and procedure site. Topical 3% lidocaine with 0.25% phenylephrine was applied to the nasal cavities. I performed the endoscopy and no complications were apparent. Continuous and stroboscopic light were utilized to assess routine phonation and variable frequency phonation.  Performed by: Betty Mitchell MD MPH  Findings: Normal nasopharynx. Normal base of tongue, valleculae, and epiglottis. Vocal fold mobility: right: normal; left: normal. Medial edges of the true vocal folds: mildly irregular bilaterally; stable anterior glottic web, stable posterior glottic blunting.  Left true vocal fold with lateral papilloma, increased since prior.  Glissade produced appropriate elongation. Mucosa of false vocal folds, aryepiglottic folds, piriform sinuses, and posterior glottis otherwise unremarkable. Airway was patent, stable compared to prior.     The addition of Narrow Band Imaging (NBI) did not show any additional abnormalities    The addition of stroboscopy allowed evaluation of the mucosal wave.   Amplitude: right: mildly decreased; left: mildly decreased. Symmetry: intermittent symmetry. Closure pattern: complete. Closure plane: at glottic level. Phase distribution: normal.                                  IMPRESSION AND PLAN:   Cleveland Rodríguez returns with minor progression of the papilloma. The exam was challenging today related to his dementia, which made him more reactive despite his efforts to participate as usual. We discussed that this is not his fault. His voice quality remains variable.    We discussed options including ongoing observation vs MDL with excision/ablation of papilloma.  They would like to talk about it and let me know. We discussed that treatment under local anesthesia is not a good fit given increased difficulty with exam tolerance as well as his baseline preferences.    If he decides not to pursue surgery at present, we will plan for him to return to clinic in 3 months or sooner as needed. If voice noticeably worsens in the meantime, will likely plan OR sooner. For now they would like to book a 3 month appt, but will update us via phone call or [facility] [employer] if he opts to proceed with surgery sooner.    I spent a total of 31 minutes on 9/18/2023 in chart review, review of tests, patient visit, documentation, care coordination, and/or discussion with other providers about the issues documented above, separate from any documented procedure(s).      Betty Mitchell MD

## 2023-09-18 NOTE — PROGRESS NOTES
Dear Colleague:  Cleveland Rodríguez recently returned for follow-up at the [facility]. My clinic note from our visit is enclosed below.  Speech recognition software may have been used in the documentation below; input is reviewed before signature to the best of my ability.     I appreciate the ongoing opportunity to participate in this patient's care.    Please feel free to contact me with any questions.      Sincerely yours,  Betty Mitchell M.D., M.P.H.  , Laryngology  Director, [facility]  Otolaryngology- Head & Neck Surgery  [phone]            =====  HISTORY OF PRESENT ILLNESS:  Cleveland Rodríguez is a pleasant 67 year old male with recurrent respiratory papillomatosis (RRP), HPV6+, as well as mild cognitive impairment (Lewy body dementia), who returns in follow up today. He has a history of numerous operating room procedures for this.     His most recent OR procedure was 10/7/21. Pathology showed benign squamous papilloma.  His most recent procedure under local anesthesia was an awake KTP laryngoscopy in 09/06/2019.     Today's updates:  - voice is variable  - swallowing and breathing feel fine, but wife notes that he has some heavy breathing while sleeping  - no new PMH/PSH        MEDICATIONS:     Current Outpatient Medications   Medication Sig Dispense Refill    aspirin 81 MG tablet Take 1 tablet by mouth daily       atorvastatin (LIPITOR) 20 MG tablet Take 20 mg by mouth      atorvastatin (LIPITOR) 20 MG tablet TAKE 1 TABLET BY MOUTH EVERY DAY 90 tablet 3    donepezil (ARICEPT) 10 MG tablet At Bedtime       doxycycline hyclate (VIBRAMYCIN) 100 MG capsule Take 1 capsule (100 mg) by mouth 2 times daily 5 capsule 0    escitalopram (LEXAPRO) 20 MG tablet       GARLIC OIL PO Take by mouth daily       KRILL OIL PO Take by mouth daily       metroNIDAZOLE (METROGEL) 1 % gel Apply 1 g topically daily apply hs for Rosacea 60 g 11    Multiple Vitamin  (DAILY MULTIVITAMIN PO) Take  by mouth 2 times daily.      nortriptyline (PAMELOR) 10 MG capsule       Omega-3 Fatty Acids (OMEGA-3 FISH OIL PO) Take by mouth daily       QUEtiapine (SEROQUEL) 25 MG tablet          ALLERGIES:    Allergies   Allergen Reactions    Amoxicillin Hives              NEW PMH/PSH: None    REVIEW OF SYSTEMS:  The patient completed a comprehensive 11 point review of systems (below), which was reviewed. Positives are as noted below.  Patient Supplied Answers to Review of Systems      9/18/2023     6:55 AM   [facility] ROS   Constitutional Appetite change    Unexplained fatigue    Problems with sleep   Neurology Headache   Psychology Frequently feeling anxious   Ears, Nose, Throat Nasal congestion or drainage    Hoarseness   Cardiopulmonary Chest pain   Gastrointestinal/Genitourinary Heartburn/indigestion    Diarrhea   Musculoskeletal Sore or stiff joints   Allergy/Immunology Allergies or hay fever   Endocrine Frequent urination         PHYSICAL EXAM:  General: The patient was alert and conversant, and in no acute distress. Patient accompanied by his spouse.  Neck: No palpable cervical lymphadenopathy, no significant tenderness to palpation of the thyrohyoid space, which was narrow. No obvious thyroid abnormality.  Resp: Breathing comfortably, no stridor or stertor.  Neuro: Symmetric facial function. Other cranial nerve function as documented above.  Psych: Normal affect, pleasant and cooperative.  Voice/speech: Mild dysphonia characterized by intermittent breathiness and roughness.      Intake scores  Last 2 Scores for Patient-Answered VHI Questionnaire      6/19/2023     7:08 AM 9/18/2023     7:06 AM   VHI Total Score   VHI Total Score 26 20      Last 2 Scores for Patient-Answered CSI Questionnaire      6/19/2023     7:08 AM 9/18/2023     7:06 AM   CSI Total Score   CSI Total Score 9 16       Procedure:   Flexible fiberoptic laryngoscopy and laryngovideostroboscopy  Indications: This procedure was  warranted to evaluate the patient's laryngeal anatomy and function. Risks, benefits, and alternatives were discussed.  Description: After written informed consent was obtained, a time-out was performed to confirm patient identity, procedure, and procedure site. Topical 3% lidocaine with 0.25% phenylephrine was applied to the nasal cavities. I performed the endoscopy and no complications were apparent. Continuous and stroboscopic light were utilized to assess routine phonation and variable frequency phonation.  Performed by: Betty Mitchell MD MPH  Findings: Normal nasopharynx. Normal base of tongue, valleculae, and epiglottis. Vocal fold mobility: right: normal; left: normal. Medial edges of the true vocal folds: mildly irregular bilaterally; stable anterior glottic web, stable posterior glottic blunting.  Left true vocal fold with lateral papilloma, increased since prior.  Glissade produced appropriate elongation. Mucosa of false vocal folds, aryepiglottic folds, piriform sinuses, and posterior glottis otherwise unremarkable. Airway was patent, stable compared to prior.     The addition of Narrow Band Imaging (NBI) did not show any additional abnormalities    The addition of stroboscopy allowed evaluation of the mucosal wave.   Amplitude: right: mildly decreased; left: mildly decreased. Symmetry: intermittent symmetry. Closure pattern: complete. Closure plane: at glottic level. Phase distribution: normal.                                  IMPRESSION AND PLAN:   Cleveland Rodríguez returns with minor progression of the papilloma. The exam was challenging today related to his dementia, which made him more reactive despite his efforts to participate as usual. We discussed that this is not his fault. His voice quality remains variable.    We discussed options including ongoing observation vs MDL with excision/ablation of papilloma. They would like to talk about it and let me know. We discussed that treatment under  local anesthesia is not a good fit given increased difficulty with exam tolerance as well as his baseline preferences.    If he decides not to pursue surgery at present, we will plan for him to return to clinic in 3 months or sooner as needed. If voice noticeably worsens in the meantime, will likely plan OR sooner. For now they would like to book a 3 month appt, but will update us via phone call or MyChart if he opts to proceed with surgery sooner.    I spent a total of 31 minutes on 9/18/2023 in chart review, review of tests, patient visit, documentation, care coordination, and/or discussion with other providers about the issues documented above, separate from any documented procedure(s).

## 2023-09-18 NOTE — PATIENT INSTRUCTIONS
1.  You were seen in the ENT Clinic today by . If you have any questions or concerns after your appointment, please call [phone]. Press option #1 for scheduling related needs. Press option #3 for Nurse advice.    2. Plan is to return to clinic in 3 months      Shey Rubio LPN  [phone]  [facility] - Otolaryngology

## 2023-12-11 ENCOUNTER — OFFICE VISIT (OUTPATIENT)
Dept: URGENT CARE | Facility: URGENT CARE | Age: 68
End: 2023-12-11
Payer: COMMERCIAL

## 2023-12-11 VITALS
SYSTOLIC BLOOD PRESSURE: 120 MMHG | TEMPERATURE: 100.2 F | HEART RATE: 75 BPM | DIASTOLIC BLOOD PRESSURE: 73 MMHG | OXYGEN SATURATION: 98 %

## 2023-12-11 DIAGNOSIS — R09.81 CONGESTION OF PARANASAL SINUS: ICD-10-CM

## 2023-12-11 DIAGNOSIS — R51.9 SINUS HEADACHE: Primary | ICD-10-CM

## 2023-12-11 LAB
FLUAV AG SPEC QL IA: NEGATIVE
FLUBV AG SPEC QL IA: NEGATIVE

## 2023-12-11 PROCEDURE — 87804 INFLUENZA ASSAY W/OPTIC: CPT | Performed by: EMERGENCY MEDICINE

## 2023-12-11 PROCEDURE — 99203 OFFICE O/P NEW LOW 30 MIN: CPT | Performed by: EMERGENCY MEDICINE

## 2023-12-11 PROCEDURE — 87635 SARS-COV-2 COVID-19 AMP PRB: CPT | Performed by: EMERGENCY MEDICINE

## 2023-12-11 RX ORDER — FLUTICASONE PROPIONATE 50 MCG
1 SPRAY, SUSPENSION (ML) NASAL DAILY
Qty: 48 G | Refills: 0 | Status: SHIPPED | OUTPATIENT
Start: 2023-12-11

## 2023-12-11 RX ORDER — FLUTICASONE PROPIONATE 50 MCG
1 SPRAY, SUSPENSION (ML) NASAL DAILY
Qty: 48 G | Refills: 0 | Status: SHIPPED | OUTPATIENT
Start: 2023-12-11 | End: 2023-12-11

## 2023-12-11 NOTE — PROGRESS NOTES
Assessment & Plan     Diagnosis:    ICD-10-CM    1. Sinus headache  R51.9 Influenza A & B Antigen - Clinic Collect     Symptomatic COVID-19 Virus (Coronavirus) by PCR Nose     fluticasone (FLONASE) 50 MCG/ACT nasal spray     DISCONTINUED: fluticasone (FLONASE) 50 MCG/ACT nasal spray      2. Congestion of paranasal sinus  R09.81 Influenza A & B Antigen - Clinic Collect     Symptomatic COVID-19 Virus (Coronavirus) by PCR Nose     fluticasone (FLONASE) 50 MCG/ACT nasal spray     DISCONTINUED: fluticasone (FLONASE) 50 MCG/ACT nasal spray          Medical Decision Making:  Cleveland Rodríguez is a 68 year old male who presents with a headache, sinus pressure/congestion, cough and URI symptoms. There is no signs at this point of serious bacterial infection such as OM, RPA, epiglottitis, PTA, strep pharyngitis, pneumonia, meningitis, bacteremia, serious bacterial infection.      No concerning headache etiology including SAH, CVA/TIA. Does have Alzheimer's and is at baseline mentation here. Does not feel he is confused. Influenza testing is negative here. COVID-19 PCR is pending at this time.    Given clear lungs, fever curve, no hypoxia and no respiratory distress I do not feel the patient needs a CXR at this point as the probability of bacterial pneumonia is very unlikely.       There are no gastrointestinal symptoms at this point and no signs of dehydration.  Close followup with PCP is indicated.  Go to ED for fever > 102 F, protracted vomiting, worsening shortness of breath, chest pain or other concerns.  Patient verbalized understanding and agreement with the plan was discharged in stable condition.      Arron Castellanos PA-C  [facility] URGENT CARE    Subjective     Cleveland Rodríguez is a 68 year old male who presents to clinic today for the following health issues:  Chief Complaint   Patient presents with    Headache     X 3 days       HPI  Patient has had a headache over the past 3 days, cough, nasal  congestion/rhinorrhea going on for maybe a little longer than that. He notes he is not really concerned for the cough or nasal congestion and feels fine otherwise.  Denies any difficulties breathing, swallowing etc.  Does have a history of Alzheimer's and feels he is at baseline, does not feel more confused than usual. He notes no vision changes, numbness or weakness, head or neck injuries or other concerns.     Review of Systems    See HPI    Objective      Vitals: /73   Pulse 75   Temp 100.2  F (37.9  C) (Tympanic)   SpO2 98%   Resp: 16    Patient Vitals for the past 24 hrs:   BP Temp Temp src Pulse SpO2   12/11/23 1121 120/73 100.2  F (37.9  C) Tympanic 75 98 %       Vital signs reviewed by: Arron Castellanos PA-C    Physical Exam   Constitutional: Patient is alert and cooperative. No acute distress.  Ears: Right TM is normal. Left TM is normal. External ear canals are normal.  Mouth: Mucous membranes are moist. Normal tongue and tonsil. Posterior oropharynx is clear.  Eyes: Conjunctivae, EOMI and lids are normal. PERRL.  Cardiovascular: Regular rate and rhythm  Pulmonary/Chest: Lungs are clear to auscultation throughout. Effort normal. No respiratory distress. No wheezes, rales or rhonchi.  GI: Abdomen is soft and non-tender throughout. No CVA tenderness bilaterally.  Neurological: Alert and oriented x3. CN 3-7 and 9-12 intact. Strength and sensation are intact and symmetric in the bilateral upper and lower extremities. Normal FNF and heel-shin tests. Gait is stable. Speech is fluent and face is symmetric.  Skin: No rash noted on visualized skin.  Psychiatric:The patient has a normal mood and affect.     Labs/Imaging:                               Influenza A & B Antigen - Clinic Collect     Status: Normal    Specimen: Nose; Swab   Result Value Ref Range    Influenza A antigen Negative Negative    Influenza B antigen Negative Negative    Narrative    Test results must be correlated with clinical data. If  necessary, results should be confirmed by a molecular assay or viral culture.         Arron Castellanos PA-C, December 11, 2023

## 2023-12-12 LAB — SARS-COV-2 RNA RESP QL NAA+PROBE: POSITIVE

## 2023-12-16 ENCOUNTER — NURSE TRIAGE (OUTPATIENT)
Dept: NURSING | Facility: CLINIC | Age: 68
End: 2023-12-16
Payer: COMMERCIAL

## 2023-12-16 NOTE — TELEPHONE ENCOUNTER
Cleveland tested positive for covid on 12/11/23.  Symptoms started 12/9/23.  Called today to ask how to manage his symptoms.    His fever, at the highest, was 100.2. That was 12/11/23.  He has a mild occasional cough.  His symptoms overall have improved.  No chest pain/pressure.  Breathing is normal.  Instructed patient on self care, what to watch for and protecting others and when to call us back.  Invited to call back with further questions and concerns.    Caller stated understanding and agreement of all we discussed.  Deyanira, callers wife, who was also present during this encounter, asked about Cleveland returning to work Monday, 12/18/23.    In addition to wearing a mask, I advised that Cleveland talk to his employer to find out if they have preferences for their employees as far as returning to work after having covid.  Understanding stated.      Reason for Disposition   [1] HIGH RISK patient (e.g., weak immune system, age > 64 years, obesity with BMI 30 or higher, pregnant, chronic lung disease or other chronic medical condition) AND [2] COVID symptoms (e.g., cough, fever)  (Exceptions: Already seen by PCP and no new or worsening symptoms.)    Additional Information   Negative: SEVERE difficulty breathing (e.g., struggling for each breath, speaks in single words)   Negative: Difficult to awaken or acting confused (e.g., disoriented, slurred speech)   Negative: Bluish (or gray) lips or face now   Negative: Shock suspected (e.g., cold/pale/clammy skin, too weak to stand, low BP, rapid pulse)   Negative: Sounds like a life-threatening emergency to the triager   Negative: [1] Diagnosed or suspected COVID-19 AND [2] symptoms lasting 3 or more weeks   Negative: [1] COVID-19 exposure AND [2] no symptoms   Negative: COVID-19 vaccine reaction suspected (e.g., fever, headache, muscle aches) occurring 1 to 3 days after getting vaccine   Negative: COVID-19 vaccine, questions about   Negative: [1] Lives with someone known to  have influenza (flu test positive) AND [2] flu-like symptoms (e.g., cough, runny nose, sore throat, SOB; with or without fever)   Negative: [1] Possible COVID-19 symptoms AND [2] triager concerned about severity of symptoms or other causes   Negative: COVID-19 and breastfeeding, questions about   Negative: SEVERE or constant chest pain or pressure  (Exception: Mild central chest pain, present only when coughing.)   Negative: MODERATE difficulty breathing (e.g., speaks in phrases, SOB even at rest, pulse 100-120)   Negative: [1] Headache AND [2] stiff neck (can't touch chin to chest)   Negative: Chest pain or pressure  (Exception: MILD central chest pain, present only when coughing.)   Negative: [1] Drinking very little AND [2] dehydration suspected (e.g., no urine > 12 hours, very dry mouth, very lightheaded)   Negative: Patient sounds very sick or weak to the triager   Negative: MILD difficulty breathing (e.g., minimal/no SOB at rest, SOB with walking, pulse <100)   Negative: Fever > 103 F (39.4 C)   Negative: [1] Fever > 101 F (38.3 C) AND [2] age > 60 years   Negative: [1] Fever > 100.0 F (37.8 C) AND [2] bedridden (e.g., CVA, chronic illness, recovering from surgery)   Commented on: Oxygen level (e.g., pulse oximetry) 90 percent or lower     unsure   Commented on: Oxygen level (e.g., pulse oximetry) 91 to 94 percent     unsure    Protocols used: Coronavirus (COVID-19) Diagnosed or [facility]-A-ST  Kathy G, RN [facility]

## 2023-12-18 ENCOUNTER — OFFICE VISIT (OUTPATIENT)
Dept: OTOLARYNGOLOGY | Facility: CLINIC | Age: 68
End: 2023-12-18
Payer: COMMERCIAL

## 2023-12-18 DIAGNOSIS — G31.83 LEWY BODY DEMENTIA, UNSPECIFIED DEMENTIA SEVERITY, UNSPECIFIED WHETHER BEHAVIORAL, PSYCHOTIC, OR MOOD DISTURBANCE OR ANXIETY (H): ICD-10-CM

## 2023-12-18 DIAGNOSIS — Z78.9 RECURRENT RESPIRATORY PAPILLOMATOSIS: ICD-10-CM

## 2023-12-18 DIAGNOSIS — F02.80 LEWY BODY DEMENTIA, UNSPECIFIED DEMENTIA SEVERITY, UNSPECIFIED WHETHER BEHAVIORAL, PSYCHOTIC, OR MOOD DISTURBANCE OR ANXIETY (H): ICD-10-CM

## 2023-12-18 DIAGNOSIS — R49.0 DYSPHONIA: Primary | ICD-10-CM

## 2023-12-18 PROCEDURE — 99214 OFFICE O/P EST MOD 30 MIN: CPT | Mod: 25 | Performed by: OTOLARYNGOLOGY

## 2023-12-18 PROCEDURE — 31575 DIAGNOSTIC LARYNGOSCOPY: CPT | Performed by: OTOLARYNGOLOGY

## 2023-12-18 NOTE — PROGRESS NOTES
Dear Colleague:  Cleveland Rodríguez recently returned for follow-up at the [facility]. My clinic note from our visit is enclosed below.  Speech recognition software may have been used in the documentation below; input is reviewed before signature to the best of my ability.     I appreciate the ongoing opportunity to participate in this patient's care.    Please feel free to contact me with any questions.      Sincerely yours,  Betty Mitchell M.D., M.P.H.  , Laryngology  Director, [facility]  Otolaryngology- Head & Neck Surgery  [phone]            =====  HISTORY OF PRESENT ILLNESS:  Cleveland Rodríguez is a pleasant 68 year old male with  recurrent respiratory papillomatosis (RRP), HPV6+, as well as mild cognitive impairment (Lewy body dementia), who returns in follow up today. He has a history of numerous operating room procedures for this.     His most recent OR procedure was 10/7/21. Pathology showed benign squamous papilloma.  His most recent procedure under local anesthesia was an awake KTP laryngoscopy in 09/06/2019.     Today's updates:  - Voice is variable  - Worse with allergies  - Swallow and breathing are stable  - No new PMH/PSH; memory challenges      MEDICATIONS:     Current Outpatient Medications   Medication Sig Dispense Refill    aspirin 81 MG tablet Take 1 tablet by mouth daily       atorvastatin (LIPITOR) 20 MG tablet Take 20 mg by mouth (Patient not taking: Reported on 12/11/2023)      atorvastatin (LIPITOR) 20 MG tablet TAKE 1 TABLET BY MOUTH EVERY DAY 90 tablet 3    donepezil (ARICEPT) 10 MG tablet At Bedtime       doxycycline hyclate (VIBRAMYCIN) 100 MG capsule Take 1 capsule (100 mg) by mouth 2 times daily 5 capsule 0    escitalopram (LEXAPRO) 20 MG tablet       fluticasone (FLONASE) 50 MCG/ACT nasal spray Spray 1 spray into both nostrils daily 48 g 0    GARLIC OIL PO Take by mouth daily       KRILL OIL PO Take by mouth daily        metroNIDAZOLE (METROGEL) 1 % gel Apply 1 g topically daily apply hs for Rosacea 60 g 11    Multiple Vitamin (DAILY MULTIVITAMIN PO) Take  by mouth 2 times daily.      nortriptyline (PAMELOR) 10 MG capsule       Omega-3 Fatty Acids (OMEGA-3 FISH OIL PO) Take by mouth daily       QUEtiapine (SEROQUEL) 25 MG tablet          ALLERGIES:    Allergies   Allergen Reactions    Amoxicillin Hives              NEW PMH/PSH: None    REVIEW OF SYSTEMS:  The patient completed a comprehensive 11 point review of systems (below), which was reviewed. Positives are as noted below.  Patient Supplied Answers to Review of Systems      12/18/2023     8:13 AM   [facility] ENT ROS   Constitutional Weight loss    Appetite change   Neurology Headache   Psychology Frequently feeling anxious   Ears, Nose, Throat Nasal congestion or drainage    Hoarseness   Cardiopulmonary Cough    Chest pain   Gastrointestinal/Genitourinary Heartburn/indigestion    Diarrhea   Musculoskeletal Sore or stiff joints   Allergy/Immunology Skin changes   Endocrine Frequent urination         PHYSICAL EXAM:  General: The patient was alert and conversant, and in no acute distress. Patient accompanied by his spouse.  Oral cavity/oropharynx: No masses or lesions. Dentition unchanged since prior. Tongue mobility and palate elevation intact and symmetric.  Neck: No palpable cervical lymphadenopathy.  Resp: Breathing comfortably, no stridor or stertor.  Neuro: Symmetric facial function. Other cranial nerve function as documented above.  Psych: Normal affect, pleasant and cooperative. Cognitive impairment mildly apparent.  Voice/speech: Mild dysphonia characterized by breathiness and roughness.      Intake scores  Last 2 Scores for Patient-Answered VHI Questionnaire      9/18/2023     7:06 AM 12/18/2023     8:20 AM   VHI Total Score   VHI Total Score 20 19      Last 2 Scores for Patient-Answered EAT Questionnaire      11/13/2017    12:58 PM 10/19/2018     9:12 AM   EAT Total Score    EAT Total Score 1 0        Last 2 Scores for Patient-Answered CSI Questionnaire      9/18/2023     7:06 AM 12/18/2023     8:20 AM   CSI Total Score   CSI Total Score 16 14       Procedure:   Flexible fiberoptic laryngoscopy  Indications: This procedure was warranted to evaluate the patient's laryngeal anatomy and function. Risks, benefits, and alternatives were discussed.  Description: After written informed consent was obtained, a time-out was performed to confirm patient identity, procedure, and procedure site. Topical 2% lidocaine and oxymetazoline were applied to the nasal cavities. I performed the endoscopy and no complications were apparent.   Performed by: Betty Mitchell MD MPH  Findings: Normal nasopharynx. Normal base of tongue, valleculae, and epiglottis. Vocal fold mobility: right: normal; left: normal. Medial edges of the true vocal folds: mostly smooth on the right with some superficial broad papilloma posteriorly; left with large clusters of papilloma along superior left true vocal fold both anteriorly and posteriorly, clearly increased since prior. Stable large anterior web, stable posterior laryngeal blunting. Mucosa of false vocal folds, aryepiglottic folds, piriform sinuses, and posterior glottis unremarkable with the exception of superficial spreading papilloma along the right posterior supraglottis. Airway was patent.  Similar findings on NBI.  Stroboscopy was planned but was not performed due to limited patient tolerance of the exam.                          IMPRESSION AND PLAN:   Cleveland Rodríguez returns with further papilloma regrowth, as expected. His dementia is a challenge for laryngoscopy but we were able to complete a brief exam today. He also has a frequently runny nose.    Plan:  1) Plan MDL with biopsy/excision/ablation, possible CO2 laser. A case request was placed.  2) Reviewed risks, need for pre-op H&P.  3) He will try Flonase consistent for runny nose; if it persists could  try azelastine/atrovent and/or consider allergy referral    I spent a total of 32 minutes on 12/18/2023 in chart review, review of tests, patient visit, documentation, care coordination, and/or discussion with other providers about the issues documented above, separate from any documented procedure(s).

## 2023-12-18 NOTE — LETTER
12/18/2023       RE: Cleveland Rodríguez  [address] [place] [address]     Dear Colleague,    Thank you for referring your patient, Cleveland Rodríguez, to the [facility] [place] [facility]. Please see a copy of my visit note below.    Dear Colleague:  Cleveland Rodríguez recently returned for follow-up at the [facility]. My clinic note from our visit is enclosed below.  Speech recognition software may have been used in the documentation below; input is reviewed before signature to the best of my ability.     I appreciate the ongoing opportunity to participate in this patient's care.    Please feel free to contact me with any questions.      Sincerely yours,  Betty Mitchell M.D., M.P.H.  , Laryngology  Director, [facility]  Otolaryngology- Head & Neck Surgery  [phone]            =====  HISTORY OF PRESENT ILLNESS:  Cleveland Rodríguez is a pleasant 68 year old male with  recurrent respiratory papillomatosis (RRP), HPV6+, as well as mild cognitive impairment (Lewy body dementia), who returns in follow up today. He has a history of numerous operating room procedures for this.     His most recent OR procedure was 10/7/21. Pathology showed benign squamous papilloma.  His most recent procedure under local anesthesia was an awake KTP laryngoscopy in 09/06/2019.     Today's updates:  - Voice is variable  - Worse with allergies  - Swallow and breathing are stable  - No new PMH/PSH; memory challenges      MEDICATIONS:     Current Outpatient Medications   Medication Sig Dispense Refill    aspirin 81 MG tablet Take 1 tablet by mouth daily       atorvastatin (LIPITOR) 20 MG tablet Take 20 mg by mouth (Patient not taking: Reported on 12/11/2023)      atorvastatin (LIPITOR) 20 MG tablet TAKE 1 TABLET BY MOUTH EVERY DAY 90 tablet 3    donepezil (ARICEPT) 10 MG tablet At  Bedtime       doxycycline hyclate (VIBRAMYCIN) 100 MG capsule Take 1 capsule (100 mg) by mouth 2 times daily 5 capsule 0    escitalopram (LEXAPRO) 20 MG tablet       fluticasone (FLONASE) 50 MCG/ACT nasal spray Spray 1 spray into both nostrils daily 48 g 0    GARLIC OIL PO Take by mouth daily       KRILL OIL PO Take by mouth daily       metroNIDAZOLE (METROGEL) 1 % gel Apply 1 g topically daily apply hs for Rosacea 60 g 11    Multiple Vitamin (DAILY MULTIVITAMIN PO) Take  by mouth 2 times daily.      nortriptyline (PAMELOR) 10 MG capsule       Omega-3 Fatty Acids (OMEGA-3 FISH OIL PO) Take by mouth daily       QUEtiapine (SEROQUEL) 25 MG tablet          ALLERGIES:    Allergies   Allergen Reactions    Amoxicillin Hives              NEW PMH/PSH: None    REVIEW OF SYSTEMS:  The patient completed a comprehensive 11 point review of systems (below), which was reviewed. Positives are as noted below.  Patient Supplied Answers to Review of Systems      12/18/2023     8:13 AM   [facility] E[facility] ROS   Constitutional Weight loss    Appetite change   Neurology Headache   Psychology Frequently feeling anxious   Ears, Nose, Throat Nasal congestion or drainage    Hoarseness   Cardiopulmonary Cough    Chest pain   Gastrointestinal/Genitourinary Heartburn/indigestion    Diarrhea   Musculoskeletal Sore or stiff joints   Allergy/Immunology Skin changes   Endocrine Frequent urination         PHYSICAL EXAM:  General: The patient was alert and conversant, and in no acute distress. Patient accompanied by his spouse.  Oral cavity/oropharynx: No masses or lesions. Dentition unchanged since prior. Tongue mobility and palate elevation intact and symmetric.  Neck: No palpable cervical lymphadenopathy.  Resp: Breathing comfortably, no stridor or stertor.  Neuro: Symmetric facial function. Other cranial nerve function as documented above.  Psych: Normal affect, pleasant and cooperative. Cognitive impairment mildly apparent.  Voice/speech: Mild dysphonia  characterized by breathiness and roughness.      Intake scores  Last 2 Scores for Patient-Answered VHI Questionnaire      9/18/2023     7:06 AM 12/18/2023     8:20 AM   VHI Total Score   VHI Total Score 20 19      Last 2 Scores for Patient-Answered EAT Questionnaire      11/13/2017    12:58 PM 10/19/2018     9:12 AM   EAT Total Score   EAT Total Score 1 0        Last 2 Scores for Patient-Answered CSI Questionnaire      9/18/2023     7:06 AM 12/18/2023     8:20 AM   CSI Total Score   CSI Total Score 16 14       Procedure:   Flexible fiberoptic laryngoscopy  Indications: This procedure was warranted to evaluate the patient's laryngeal anatomy and function. Risks, benefits, and alternatives were discussed.  Description: After written informed consent was obtained, a time-out was performed to confirm patient identity, procedure, and procedure site. Topical 2% lidocaine and oxymetazoline were applied to the nasal cavities. I performed the endoscopy and no complications were apparent.   Performed by: Betty Mitchell MD MPH  Findings: Normal nasopharynx. Normal base of tongue, valleculae, and epiglottis. Vocal fold mobility: right: normal; left: normal. Medial edges of the true vocal folds: mostly smooth on the right with some superficial broad papilloma posteriorly; left with large clusters of papilloma along superior left true vocal fold both anteriorly and posteriorly, clearly increased since prior. Stable large anterior web, stable posterior laryngeal blunting. Mucosa of false vocal folds, aryepiglottic folds, piriform sinuses, and posterior glottis unremarkable with the exception of superficial spreading papilloma along the right posterior supraglottis. Airway was patent.  Similar findings on NBI.  Stroboscopy was planned but was not performed due to limited patient tolerance of the exam.                          IMPRESSION AND PLAN:   Cleveland Rodríguez returns with further papilloma regrowth, as expected. His  dementia is a challenge for laryngoscopy but we were able to complete a brief exam today. He also has a frequently runny nose.    Plan:  1) Plan MDL with biopsy/excision/ablation, possible CO2 laser. A case request was placed.  2) Reviewed risks, need for pre-op H&P.  3) He will try Flonase consistent for runny nose; if it persists could try azelastine/atrovent and/or consider allergy referral    I spent a total of 32 minutes on 12/18/2023 in chart review, review of tests, patient visit, documentation, care coordination, and/or discussion with other providers about the issues documented above, separate from any documented procedure(s).      Again, thank you for allowing me to participate in the care of your patient.      Sincerely,    Betty Mitchell MD

## 2023-12-18 NOTE — PATIENT INSTRUCTIONS
1.  You were seen in the ENT Clinic today by . If you have any questions or concerns after your appointment, please call [phone]. Press option #1 for scheduling related needs. Press option #3 for Nurse advice.    2.   has recommended the following:   - plan for MDL with biopsy. Surgery scheduler will contact you to schedule   - try Flonase to manage runny nose, if that does not help can try Azelastine or consider Allergy referral    3.  Plan is to return to clinic for post operative follow up. FRANCINE Rubio LPN  [phone]  [facility] - Otolaryngology

## 2023-12-19 ENCOUNTER — TELEPHONE (OUTPATIENT)
Dept: OTOLARYNGOLOGY | Facility: CLINIC | Age: 68
End: 2023-12-19
Payer: COMMERCIAL

## 2023-12-19 NOTE — TELEPHONE ENCOUNTER
Called patient to schedule surgery with Dr. Mitchell. No answer, callback number [phone] left on  for patient.     Ramila Amaya on 12/19/2023 at 11:30 AM

## 2023-12-20 ENCOUNTER — TELEPHONE (OUTPATIENT)
Dept: OTOLARYNGOLOGY | Facility: CLINIC | Age: 68
End: 2023-12-20
Payer: COMMERCIAL

## 2023-12-20 NOTE — TELEPHONE ENCOUNTER
Scheduled surgery with Dr. Mitchell on 2/1/2024    Spoke with: SHELL GALLOWAY (Spouse)     Surgery is located at [place] [facility]    Patient will be seen for their H&P by:    PCP [facility] within 30 days of surgery - Confirmed PCP on file is up to date    Anesthesia type: General      Requested Imaging required for surgery: NA    Patient needs scheduled for their 3 week post op    Patient will receive their packet via mail per their preference - Confirmed address on file is up to date    Patient was provided an early morning arrival time for their surgery. They are aware that this is subject to change & will be notified of their arrival time 3-5 days before surgery.     Patient is aware they need a  to & from surgery    Additional comments: Patient will call back if they have any questions or concerns.    Arlet Pelaez on 12/20/2023 at 10:13 AM

## 2024-01-29 NOTE — TELEPHONE ENCOUNTER
Patients wife called in wanting to confirm patients arrival time for upcoming surgery with Dr. Mitchell on 2/01/24. Informed patients wife I did not see documentation of any attempted calls on pre op departments end, however I could transfer her to pre op team to confirm if they are able to give arrival time. Patients wife transferred to [phone].    Ramila Amaya on 1/29/2024 at 8:42 AM

## 2024-02-01 ENCOUNTER — ANESTHESIA EVENT (OUTPATIENT)
Dept: SURGERY | Facility: CLINIC | Age: 69
End: 2024-02-01
Payer: COMMERCIAL

## 2024-02-01 ENCOUNTER — ANESTHESIA (OUTPATIENT)
Dept: SURGERY | Facility: CLINIC | Age: 69
End: 2024-02-01
Payer: COMMERCIAL

## 2024-02-01 ENCOUNTER — HOSPITAL ENCOUNTER (OUTPATIENT)
Facility: CLINIC | Age: 69
Discharge: HOME | End: 2024-02-01
Attending: OTOLARYNGOLOGY | Admitting: OTOLARYNGOLOGY
Payer: COMMERCIAL

## 2024-02-01 VITALS
SYSTOLIC BLOOD PRESSURE: 114 MMHG | OXYGEN SATURATION: 98 % | HEART RATE: 65 BPM | HEIGHT: 74 IN | RESPIRATION RATE: 14 BRPM | TEMPERATURE: 98.2 F | BODY MASS INDEX: 25.44 KG/M2 | DIASTOLIC BLOOD PRESSURE: 81 MMHG | WEIGHT: 198.19 LBS

## 2024-02-01 PROCEDURE — 250N000009 HC RX 250: Performed by: OTOLARYNGOLOGY

## 2024-02-01 PROCEDURE — 31541 LARYNSCOP W/TUMR EXC + SCOPE: CPT | Mod: GC | Performed by: OTOLARYNGOLOGY

## 2024-02-01 PROCEDURE — 250N000011 HC RX IP 250 OP 636: Performed by: OTOLARYNGOLOGY

## 2024-02-01 PROCEDURE — 360N000077 HC SURGERY LEVEL 4, PER MIN: Performed by: OTOLARYNGOLOGY

## 2024-02-01 PROCEDURE — 370N000017 HC ANESTHESIA TECHNICAL FEE, PER MIN: Performed by: OTOLARYNGOLOGY

## 2024-02-01 PROCEDURE — 250N000011 HC RX IP 250 OP 636: Performed by: ANESTHESIOLOGY

## 2024-02-01 PROCEDURE — 88305 TISSUE EXAM BY PATHOLOGIST: CPT | Mod: TC | Performed by: OTOLARYNGOLOGY

## 2024-02-01 PROCEDURE — 250N000009 HC RX 250: Performed by: NURSE ANESTHETIST, CERTIFIED REGISTERED

## 2024-02-01 PROCEDURE — 999N000141 HC STATISTIC PRE-PROCEDURE NURSING ASSESSMENT: Performed by: OTOLARYNGOLOGY

## 2024-02-01 PROCEDURE — 250N000011 HC RX IP 250 OP 636: Performed by: NURSE ANESTHETIST, CERTIFIED REGISTERED

## 2024-02-01 PROCEDURE — 88305 TISSUE EXAM BY PATHOLOGIST: CPT | Mod: 26 | Performed by: STUDENT IN AN ORGANIZED HEALTH CARE EDUCATION/TRAINING PROGRAM

## 2024-02-01 PROCEDURE — 31622 DX BRONCHOSCOPE/WASH: CPT | Mod: 51 | Performed by: OTOLARYNGOLOGY

## 2024-02-01 PROCEDURE — 710N000010 HC RECOVERY PHASE 1, LEVEL 2, PER MIN: Performed by: OTOLARYNGOLOGY

## 2024-02-01 PROCEDURE — 258N000003 HC RX IP 258 OP 636: Performed by: NURSE ANESTHETIST, CERTIFIED REGISTERED

## 2024-02-01 PROCEDURE — 710N000012 HC RECOVERY PHASE 2, PER MINUTE: Performed by: OTOLARYNGOLOGY

## 2024-02-01 PROCEDURE — 31536 LARYNGOSCOPY W/BX & OP SCOPE: CPT | Mod: 51 | Performed by: OTOLARYNGOLOGY

## 2024-02-01 PROCEDURE — 272N000001 HC OR GENERAL SUPPLY STERILE: Performed by: OTOLARYNGOLOGY

## 2024-02-01 RX ORDER — PROPOFOL 10 MG/ML
INJECTION, EMULSION INTRAVENOUS PRN
Status: DISCONTINUED | OUTPATIENT
Start: 2024-02-01 | End: 2024-02-01

## 2024-02-01 RX ORDER — HYDROMORPHONE HCL IN WATER/PF 6 MG/30 ML
0.2 PATIENT CONTROLLED ANALGESIA SYRINGE INTRAVENOUS EVERY 5 MIN PRN
Status: DISCONTINUED | OUTPATIENT
Start: 2024-02-01 | End: 2024-02-01 | Stop reason: HOSPADM

## 2024-02-01 RX ORDER — ESMOLOL HYDROCHLORIDE 10 MG/ML
INJECTION INTRAVENOUS PRN
Status: DISCONTINUED | OUTPATIENT
Start: 2024-02-01 | End: 2024-02-01

## 2024-02-01 RX ORDER — OXYCODONE HYDROCHLORIDE 10 MG/1
10 TABLET ORAL
Status: CANCELLED | OUTPATIENT
Start: 2024-02-01

## 2024-02-01 RX ORDER — ONDANSETRON 2 MG/ML
INJECTION INTRAMUSCULAR; INTRAVENOUS PRN
Status: DISCONTINUED | OUTPATIENT
Start: 2024-02-01 | End: 2024-02-01

## 2024-02-01 RX ORDER — ONDANSETRON 4 MG/1
4 TABLET, ORALLY DISINTEGRATING ORAL EVERY 30 MIN PRN
Status: DISCONTINUED | OUTPATIENT
Start: 2024-02-01 | End: 2024-02-01 | Stop reason: HOSPADM

## 2024-02-01 RX ORDER — ONDANSETRON 2 MG/ML
4 INJECTION INTRAMUSCULAR; INTRAVENOUS EVERY 30 MIN PRN
Status: DISCONTINUED | OUTPATIENT
Start: 2024-02-01 | End: 2024-02-01 | Stop reason: HOSPADM

## 2024-02-01 RX ORDER — FENTANYL CITRATE 50 UG/ML
INJECTION, SOLUTION INTRAMUSCULAR; INTRAVENOUS PRN
Status: DISCONTINUED | OUTPATIENT
Start: 2024-02-01 | End: 2024-02-01

## 2024-02-01 RX ORDER — DEXAMETHASONE SODIUM PHOSPHATE 4 MG/ML
10 INJECTION, SOLUTION INTRA-ARTICULAR; INTRALESIONAL; INTRAMUSCULAR; INTRAVENOUS; SOFT TISSUE ONCE
Status: COMPLETED | OUTPATIENT
Start: 2024-02-01 | End: 2024-02-01

## 2024-02-01 RX ORDER — SODIUM CHLORIDE, SODIUM LACTATE, POTASSIUM CHLORIDE, CALCIUM CHLORIDE 600; 310; 30; 20 MG/100ML; MG/100ML; MG/100ML; MG/100ML
INJECTION, SOLUTION INTRAVENOUS CONTINUOUS PRN
Status: DISCONTINUED | OUTPATIENT
Start: 2024-02-01 | End: 2024-02-01

## 2024-02-01 RX ORDER — HYDROMORPHONE HCL IN WATER/PF 6 MG/30 ML
0.4 PATIENT CONTROLLED ANALGESIA SYRINGE INTRAVENOUS EVERY 5 MIN PRN
Status: DISCONTINUED | OUTPATIENT
Start: 2024-02-01 | End: 2024-02-01 | Stop reason: HOSPADM

## 2024-02-01 RX ORDER — FENTANYL CITRATE 50 UG/ML
25 INJECTION, SOLUTION INTRAMUSCULAR; INTRAVENOUS EVERY 5 MIN PRN
Status: DISCONTINUED | OUTPATIENT
Start: 2024-02-01 | End: 2024-02-01 | Stop reason: HOSPADM

## 2024-02-01 RX ORDER — EPINEPHRINE 0.1 MG/ML
INJECTION INTRAVENOUS PRN
Status: DISCONTINUED | OUTPATIENT
Start: 2024-02-01 | End: 2024-02-01 | Stop reason: HOSPADM

## 2024-02-01 RX ORDER — LIDOCAINE HYDROCHLORIDE 40 MG/ML
SOLUTION TOPICAL PRN
Status: DISCONTINUED | OUTPATIENT
Start: 2024-02-01 | End: 2024-02-01 | Stop reason: HOSPADM

## 2024-02-01 RX ORDER — ONDANSETRON 2 MG/ML
4 INJECTION INTRAMUSCULAR; INTRAVENOUS EVERY 30 MIN PRN
Status: CANCELLED | OUTPATIENT
Start: 2024-02-01

## 2024-02-01 RX ORDER — SODIUM CHLORIDE, SODIUM LACTATE, POTASSIUM CHLORIDE, CALCIUM CHLORIDE 600; 310; 30; 20 MG/100ML; MG/100ML; MG/100ML; MG/100ML
INJECTION, SOLUTION INTRAVENOUS CONTINUOUS
Status: DISCONTINUED | OUTPATIENT
Start: 2024-02-01 | End: 2024-02-01 | Stop reason: HOSPADM

## 2024-02-01 RX ORDER — PROPOFOL 10 MG/ML
INJECTION, EMULSION INTRAVENOUS CONTINUOUS PRN
Status: DISCONTINUED | OUTPATIENT
Start: 2024-02-01 | End: 2024-02-01

## 2024-02-01 RX ORDER — OXYCODONE HYDROCHLORIDE 5 MG/1
5 TABLET ORAL
Status: CANCELLED | OUTPATIENT
Start: 2024-02-01

## 2024-02-01 RX ORDER — FENTANYL CITRATE 50 UG/ML
50 INJECTION, SOLUTION INTRAMUSCULAR; INTRAVENOUS EVERY 5 MIN PRN
Status: DISCONTINUED | OUTPATIENT
Start: 2024-02-01 | End: 2024-02-01 | Stop reason: HOSPADM

## 2024-02-01 RX ORDER — ONDANSETRON 4 MG/1
4 TABLET, ORALLY DISINTEGRATING ORAL EVERY 30 MIN PRN
Status: CANCELLED | OUTPATIENT
Start: 2024-02-01

## 2024-02-01 RX ORDER — LIDOCAINE HYDROCHLORIDE 20 MG/ML
INJECTION, SOLUTION INFILTRATION; PERINEURAL PRN
Status: DISCONTINUED | OUTPATIENT
Start: 2024-02-01 | End: 2024-02-01

## 2024-02-01 RX ORDER — CLINDAMYCIN PHOSPHATE 900 MG/50ML
900 INJECTION, SOLUTION INTRAVENOUS SEE ADMIN INSTRUCTIONS
Status: DISCONTINUED | OUTPATIENT
Start: 2024-02-01 | End: 2024-02-01 | Stop reason: HOSPADM

## 2024-02-01 RX ORDER — CLINDAMYCIN PHOSPHATE 900 MG/50ML
900 INJECTION, SOLUTION INTRAVENOUS
Status: COMPLETED | OUTPATIENT
Start: 2024-02-01 | End: 2024-02-01

## 2024-02-01 RX ADMIN — Medication 30 MG: at 08:50

## 2024-02-01 RX ADMIN — SUGAMMADEX 100 MG: 100 INJECTION, SOLUTION INTRAVENOUS at 10:06

## 2024-02-01 RX ADMIN — PROPOFOL 200 MG: 10 INJECTION, EMULSION INTRAVENOUS at 07:37

## 2024-02-01 RX ADMIN — LIDOCAINE HYDROCHLORIDE 80 MG: 20 INJECTION, SOLUTION INFILTRATION; PERINEURAL at 07:37

## 2024-02-01 RX ADMIN — Medication 20 MG: at 08:26

## 2024-02-01 RX ADMIN — SUGAMMADEX 100 MG: 100 INJECTION, SOLUTION INTRAVENOUS at 10:02

## 2024-02-01 RX ADMIN — ONDANSETRON 4 MG: 2 INJECTION INTRAMUSCULAR; INTRAVENOUS at 09:54

## 2024-02-01 RX ADMIN — PROPOFOL 30 MG: 10 INJECTION, EMULSION INTRAVENOUS at 08:14

## 2024-02-01 RX ADMIN — FENTANYL CITRATE 25 MCG: 50 INJECTION INTRAMUSCULAR; INTRAVENOUS at 09:15

## 2024-02-01 RX ADMIN — Medication 50 MG: at 07:41

## 2024-02-01 RX ADMIN — ONDANSETRON 4 MG: 2 INJECTION INTRAMUSCULAR; INTRAVENOUS at 10:19

## 2024-02-01 RX ADMIN — PHENYLEPHRINE HYDROCHLORIDE 0.3 MCG/KG/MIN: 10 INJECTION INTRAVENOUS at 07:43

## 2024-02-01 RX ADMIN — SODIUM CHLORIDE, POTASSIUM CHLORIDE, SODIUM LACTATE AND CALCIUM CHLORIDE: 600; 310; 30; 20 INJECTION, SOLUTION INTRAVENOUS at 07:26

## 2024-02-01 RX ADMIN — FENTANYL CITRATE 50 MCG: 50 INJECTION INTRAMUSCULAR; INTRAVENOUS at 07:37

## 2024-02-01 RX ADMIN — Medication 30 MG: at 09:27

## 2024-02-01 RX ADMIN — DEXAMETHASONE SODIUM PHOSPHATE 8 MG: 4 INJECTION, SOLUTION INTRAMUSCULAR; INTRAVENOUS at 07:56

## 2024-02-01 RX ADMIN — Medication 20 MG: at 09:46

## 2024-02-01 RX ADMIN — Medication 20 MG: at 09:12

## 2024-02-01 RX ADMIN — Medication 30 MG: at 08:02

## 2024-02-01 RX ADMIN — CLINDAMYCIN IN 5 PERCENT DEXTROSE 900 MG: 18 INJECTION, SOLUTION INTRAVENOUS at 07:15

## 2024-02-01 RX ADMIN — ESMOLOL HYDROCHLORIDE 10 MG: 10 INJECTION, SOLUTION INTRAVENOUS at 08:59

## 2024-02-01 RX ADMIN — PHENYLEPHRINE HYDROCHLORIDE 100 MCG: 10 INJECTION INTRAVENOUS at 07:43

## 2024-02-01 RX ADMIN — PROPOFOL 150 MCG/KG/MIN: 10 INJECTION, EMULSION INTRAVENOUS at 07:40

## 2024-02-01 RX ADMIN — FENTANYL CITRATE 25 MCG: 50 INJECTION INTRAMUSCULAR; INTRAVENOUS at 08:38

## 2024-02-01 RX ADMIN — SUGAMMADEX 200 MG: 100 INJECTION, SOLUTION INTRAVENOUS at 09:57

## 2024-02-01 ASSESSMENT — ACTIVITIES OF DAILY LIVING (ADL)
ADLS_ACUITY_SCORE: 33

## 2024-02-01 NOTE — ANESTHESIA POSTPROCEDURE EVALUATION
Patient: Cleveland Rodríguez    Procedure: Procedure(s):  Microdirect laryngoscopy with excision/ablation of laryngeal lesions, biopsies, CO2 laser       Anesthesia Type:  General    Note:  Disposition: Outpatient   Postop Pain Control: Uneventful            Sign Out: Well controlled pain   PONV: No   Neuro/Psych: Uneventful            Sign Out: Acceptable/Baseline neuro status   Airway/Respiratory: Uneventful            Sign Out: Acceptable/Baseline resp. status   CV/Hemodynamics: Uneventful            Sign Out: Acceptable CV status; No obvious hypovolemia; No obvious fluid overload   Other NRE: NONE   DID A NON-ROUTINE EVENT OCCUR? No           Last vitals:  Vitals Value Taken Time   /68 02/01/24 1015   Temp     Pulse 60 02/01/24 1020   Resp     SpO2 100 % 02/01/24 1020   Vitals shown include unfiled device data.    Electronically Signed By: Travis Juárez MD  February 1, 2024  10:21 AM

## 2024-02-01 NOTE — OR NURSING
Betty Mitcehll MD at patient bedside. Instructed nursing staff to wait until numbness in his throat is gone to progress to ice chips/fluids. Spouse was updated by MD. Patient cleared for Phase II once criteria is met followed by discharge home.

## 2024-02-01 NOTE — OR NURSING
Ok for pt to begin taking his ASA 81 mg tomorrow per Dr Mitchell.  This info was given to pt's wife, Deyanira.

## 2024-02-01 NOTE — OP NOTE
PROCEDURE(S):  Microdirect laryngoscopy with CO2 laser excision and ablation of laryngeal papillomas  Microdirect laryngoscopy with biopsies  Tracheobronchoscopy    PRE-OPERATIVE DIAGNOSIS:   Recurrent laryngeal papillomas    POST-OPERATIVE DIAGNOSIS:   Recurrent laryngeal papillomas  Anterior and posterior laryngeal webs, mild    SURGEON: Betty Mitchell MD MPH    ASSISTANT: Damien Lang MD    ANAESTHESIA: General endotracheal, laser-safe endotracheal tube    INDICATIONS FOR PROCEDURE:   Cleveland Rodríguez is a 68 year old year old male with a history of recurrent respiratory papillomatosis who was noted to have significant burden of disease in clinic. He has been treated with numerous procedures in the past. Operative intervention was recommended. After the risks, benefits, and alternatives had been discussed, the patient wished to proceed and presented for the procedure listed above.    DESCRIPTION OF PROCEDURE:   The patient was brought to the operating room and placed supine. A time-out was called to verify patient identity, operative site, and planned procedure. The operative site was prepped in the usual clean fashion. Moist gauze eye pads were placed and secured. A head wrap was placed, and a custom molded thermoplastic tooth guards was placed on the lower dentition. The patient had his own upper mouth guard that was used throughout the case. Direct laryngoscopy was performed with an Ossoff-Pilling laryngoscope, which was placed in suspension. The vocal folds were examined with 0 and 70 degree telescopes. There was extensive papillomatous disease along the bilateral true vocal folds, ventricles and false vocal folds with some sparing anteriorly on the right. Portions of the bulky disease were biopsied with a 2 mm cup forceps. These specimens were sent to Pathology for further examination.    The operating microscope was then brought into position. We then prepared to use the laser for ablation of the  papillomas. Laser safety precautions were utilized at all times, including eye protection for the patient and staff, use of wet towels, and appropriately minimized FiO2. As needed, moistened cottonoids or laser-safe backstops were used to protect the endotracheal tube from the laser. The Lumenis CO2 Accublade laser was attached to the microscope and used at a depth setting of 1 and 8 Souza.     The right false and true vocal fold papillomas were ablated and removed gradually, with care to avoid injury to the vocal ligament and to preserve some superficial lamina propria. Some of the ventricular papilloma was also ablated, although papilloma along the underside of the ventricle was not accessible for direct ablation. Once adequate removal had been achieved particularly along the right true vocal fold, we turned our attention to the left side of the larynx. The papillomas were also ablated on the left false and true vocal folds, taking care not to make two opposing raw surfaces that could increase the risk of web formation. Some of the left ventricular papilloma was also ablated. In all regions, strips of intact mucosa were preserved to reduce the risk of further webbing.    The endoscope was used to pass into the subglottis and distal airway with the cuff deflated, to examine for any further disease. The subglottis and distal airway appeared clear down to the leobardo, where the takeoffs of the bilateral mainstem bronchi were visualized. The cuff was reinflated.    The laryngoscope was then repositioned so that the posterior glottis could be visualized. Again, significant burden of disease was encountered, and the CO2 laser was used to ablate papillomas on each side. We did leave a cuff of papilloma intact in the posterior left commissure and another along the right posterior larynx to reduce the risk of worsening his mild existing posterior glottic web.    Cottonoids soaked in 1:10,000 epinephrine were sparingly used  to maintain hemostasis. As needed during the procedure and at the conclusion of the procedure, the operating microscope was moved out of position and the 0 and 70 degree rigid endoscopes were again used to examine the vocal folds and confirm completion of the stated objectives of the procedure. After cottonoid and sponge counts were confirmed correct, 2 cc of 4% lidocaine were applied transglottically for laryngotracheal anesthesia. The laryngoscope and tooth guards were removed. The lips, teeth, and tongue were examined and no injuries were noted. Care of the patient was returned to Anesthesia.      FINDINGS:   1. Easy mask. Uneventful intubation. Good laryngeal exposure with Ossoff laryngoscope.  2. Extensive papillomatous disease along the right true vocal fold extending from the mid-anterior true vocal fold to the posterior glottis. These were present along the superior and inferior aspect of the vocal fold. Additional papillomas were present on the right false cord. The ventricle was also involved. The laser was used to remove most of the papillomas with the exception of a portion of the ventricular papilloma which was not accessible due to location.   3. Left true vocal fold with extensive papillomas along the anterior to posterior aspect. These were removed with the laser. The left anterior commissure was not ablated medially to reduce the risk of web propagation. Left ventricle with mild papillomatosis. Left false vocal fold also with limited papilloma along mid portion; left posterior supraglottis with focal papilloma.   4. Additional papilloma present along the posterior glottis bilaterally. A small cuff of papillomas were left undisturbed along the right and left posterior glottis to decrease the risk of further webbing.  5. No papillomatous disease in the subglottis or distal airway.      SPECIMEN(S):   1. Right false vocal fold   2. Right posterior true vocal fold  3. Left true vocal fold  4. Left  posterior supraglottis    DRAINS: None    ESTIMATED BLOOD LOSS: Minimal    COMPLICATIONS: None    DISPOSITION: Stable to PACU    ATTENDING PRESENCE STATEMENT: I was present and participating for all portions of the procedure from beginning to completion.

## 2024-02-01 NOTE — ANESTHESIA PROCEDURE NOTES
Airway       Patient location during procedure: OR       Procedure Start/Stop Times: 2/1/2024 7:45 AM  Staff -        CRNA: Teri Stoll APRN CRNA       Other Anesthesia Staff: Iris Jackson       Performed By: CRNA  Consent for Airway        Urgency: elective  Indications and Patient Condition       Indications for airway management: mady-procedural       Induction type:intravenous       Mask difficulty assessment: 2 - vent by mask + OA or adjuvant +/- NMBA    Final Airway Details       Final airway type: endotracheal airway       Successful airway: Laser and ETT - single  Endotracheal Airway Details        ETT size (mm): 5.0       Cuffed: yes       Successful intubation technique: direct laryngoscopy       DL Blade Type: Morel 2       Grade View of Cords: 1       Adjucts: stylet       Position: Left       Measured from: lips       Secured at (cm): 23       Bite block used: None    Post intubation assessment        Number of attempts at approach: 1       Number of other approaches attempted: 0       Secured with: tape       Ease of procedure: easy       Dentition: Intact and Unchanged    Medication(s) Administered   Medication Administration Time: 2/1/2024 7:45 AM

## 2024-02-01 NOTE — BRIEF OP NOTE
[facility]    Brief Operative Note    Pre-operative diagnosis: Dysphonia [R49.0]  Recurrent respiratory papillomatosis [Z78.9]  Lewy body dementia, unspecified dementia severity, unspecified whether behavioral, psychotic, or mood disturbance or anxiety (H) [G31.83, F02.80]  Post-operative diagnosis Same as pre-operative diagnosis    Procedure: Microdirect laryngoscopy with excision/ablation of laryngeal lesions, possible biopsies, possible CO2 laser, N/A - Throat    Surgeon: Surgeon(s) and Role:     * Betty Mitchell MD - Primary     * Damien Lang MD - Resident - Assisting  Anesthesia: General   Estimated Blood Loss: Minimal    Drains: None  Specimens:   ID Type Source Tests Collected by Time Destination   1 : Right false vocal fold Tissue Other SURGICAL PATHOLOGY EXAM Betty Mitchell MD 2/1/2024  8:16 AM    2 : Right posterior true vocal fold Tissue Other SURGICAL PATHOLOGY EXAM Betty Mitchell MD 2/1/2024  8:20 AM    3 : Left true vocal fold Tissue Other SURGICAL PATHOLOGY EXAM Betty Mitchell MD 2/1/2024  8:21 AM    4 : Left posterior supraglottis Tissue Other SURGICAL PATHOLOGY EXAM Betty Mitchell MD 2/1/2024  8:30 AM      Findings:   Papilloma taken for pathology as listed above, remainder of papilloma that was accessible was debulked and ablated with CO2 laser.  .  Complications: None.  Implants: * No implants in log *      Damien Lang MD  ENT Resident, PGY-1

## 2024-02-01 NOTE — ANESTHESIA PREPROCEDURE EVALUATION
Anesthesia Pre-Procedure Evaluation    Patient: Cleveland Rodríguez   MRN: [id number] : 1955        Procedure : Procedure(s):  Microdirect laryngoscopy with excision/ablation of laryngeal lesions, possible biopsies, possible CO2 laser          Past Medical History:   Diagnosis Date    Hoarseness     Other and unspecified hyperlipidemia     Polyp of vocal cord or larynx     Rosacea     Sleep apnea     Uveitis       Past Surgical History:   Procedure Laterality Date    CATARACT IOL, RT/LT      right eye only    COLONOSCOPY  2013    Procedure: COLONOSCOPY;  COLONOSCOPY;  Surgeon: Sawyer Niño MD;  Location:  GI    LASER CO2 LARYNGOSCOPY Bilateral 10/26/2017    Procedure: LASER CO2 LARYNGOSCOPY;  Microdirect Laryngoscopy with Carbon Dioxide Laser Excision/Ablation of Laryngeal Papilloma, Biopsies;  Surgeon: Betty Mitchell MD;  Location: [facility] OR    LASER CO2 LARYNGOSCOPY, COMPLEX N/A 10/7/2021    Procedure: Microdirect Laryngoscopy with Carbon Dioxide Laser Excision/Ablation of Laryngeal Papilloma, Biopsies;  Surgeon: Betty Mitchell MD;  Location: [facility] OR    LASER KTP LARYNGOSCOPY EXCISE VOCAL CORD LESION Bilateral 2019    Procedure: Transnasal Laryngoscopy with KTP Laser Ablation of Recurrent Respiratory Papillomas;  Surgeon: Betty Mitchell MD;  Location: [facility] OR    SURGICAL HISTORY OF -       about 55 vocal cord surgeries      Allergies   Allergen Reactions    Amoxicillin Hives             Social History     Tobacco Use    Smoking status: Never    Smokeless tobacco: Never   Substance Use Topics    Alcohol use: Yes     Comment: social      Wt Readings from Last 1 Encounters:   24 89.9 kg (198 lb 3.1 oz)        Anesthesia Evaluation            ROS/MED HX  ENT/Pulmonary: Comment:  From ; Final airway type: endotracheal airway       Successful airway: Laser and ETT - single (TENAX laser tube)  Endotracheal Airway Details        ETT size (mm): 5.0       Cuffed:  "yes       Successful intubation technique: direct laryngoscopy       DL Blade Type: Morel 2       Grade View of Cords: 1       Adjucts: stylet and tooth guard       Position: Left       Measured from: lips       Bite block used: None      (+) sleep apnea,                                       Neurologic:       Cardiovascular: Comment: Sinus rhythm with 1st degree AV block  Incomplete right bundle branch block    (+) Dyslipidemia - -   -  - -                                      METS/Exercise Tolerance: >4 METS    Hematologic:       Musculoskeletal:       GI/Hepatic:       Renal/Genitourinary:       Endo:       Psychiatric/Substance Use:       Infectious Disease:       Malignancy:       Other:            Physical Exam    Airway        Mallampati: II   TM distance: > 3 FB   Neck ROM: full   Mouth opening: > 3 cm    Respiratory Devices and Support         Dental       (+) Completely normal teeth      Cardiovascular   cardiovascular exam normal          Pulmonary   pulmonary exam normal                OUTSIDE LABS:  CBC:   Lab Results   Component Value Date    WBC 2.6 (L) 01/29/2021    WBC 3.2 (L) 01/20/2021    HGB 13.1 (L) 01/29/2021    HGB 13.7 01/20/2021    HCT 39.8 (L) 01/29/2021    HCT 41.1 01/20/2021     01/29/2021     (L) 01/20/2021     BMP:   Lab Results   Component Value Date     01/29/2021     01/20/2021    POTASSIUM 3.6 01/29/2021    POTASSIUM 4.7 01/20/2021    CHLORIDE 107 01/29/2021    CHLORIDE 104 01/20/2021    CO2 26 01/29/2021    CO2 28 01/20/2021    BUN 25 01/29/2021    BUN 24 01/20/2021    CR 1.08 01/29/2021    CR 1.03 01/20/2021    GLC 91 10/07/2021     (H) 01/29/2021     COAGS: No results found for: \"PTT\", \"INR\", \"FIBR\"  POC: No results found for: \"BGM\", \"HCG\", \"HCGS\"  HEPATIC:   Lab Results   Component Value Date    ALBUMIN 2.9 (L) 01/29/2021    PROTTOTAL 7.1 01/29/2021    ALT 37 01/29/2021    AST 47 (H) 01/29/2021    ALKPHOS 84 01/29/2021    BILITOTAL 0.3 " "01/29/2021     OTHER:   Lab Results   Component Value Date    LACT 0.9 01/29/2021    A1C 5.7 09/08/2016    PETRA 8.5 01/29/2021    LIPASE 242 01/29/2021    TSH 1.27 01/29/2021    T4 1.00 12/11/2002    CRP <5.0 06/19/2009    SED 7 08/30/2018       Anesthesia Plan    ASA Status:  2    NPO Status:  NPO Appropriate    Anesthesia Type: General.     - Airway: ETT   Induction: Intravenous.   Maintenance: Balanced.   Techniques and Equipment:     - Airway: Video-Laryngoscope       Consents    Anesthesia Plan(s) and associated risks, benefits, and realistic alternatives discussed. Questions answered and patient/representative(s) expressed understanding.     - Discussed: Risks, Benefits and Alternatives for BOTH SEDATION and the PROCEDURE were discussed     - Discussed with:  Patient      - Extended Intubation/Ventilatory Support Discussed: No.      - Patient is DNR/DNI Status: No     Use of blood products discussed: No .     Postoperative Care    Pain management: IV analgesics.   PONV prophylaxis: Ondansetron (or other 5HT-3), Dexamethasone or Solumedrol, Promethazine or metoclopramide     Comments:               Travis Juárez MD    I have reviewed the pertinent notes and labs in the chart from the past 30 days and (re)examined the patient.  Any updates or changes from those notes are reflected in this note.             # Drug Induced Platelet Defect: home medication list includes an antiplatelet medication  # Overweight: Estimated body mass index is 25.45 kg/m  as calculated from the following:    Height as of this encounter: 1.88 m (6' 2\").    Weight as of this encounter: 89.9 kg (198 lb 3.1 oz).      "

## 2024-02-01 NOTE — ANESTHESIA CARE TRANSFER NOTE
Patient: Cleveland Rodríguez    Procedure: Procedure(s):  Microdirect laryngoscopy with excision/ablation of laryngeal lesions, biopsies, CO2 laser       Diagnosis: Dysphonia [R49.0]  Recurrent respiratory papillomatosis [Z78.9]  Lewy body dementia, unspecified dementia severity, unspecified whether behavioral, psychotic, or mood disturbance or anxiety (H) [G31.83, F02.80]  Diagnosis Additional Information: No value filed.    Anesthesia Type:   General     Note:    Oropharynx: oropharynx clear of all foreign objects  Level of Consciousness: awake  Oxygen Supplementation: face mask  Level of Supplemental Oxygen (L/min / FiO2): 10  Independent Airway: airway patency satisfactory and stable  Dentition: dentition unchanged  Vital Signs Stable: post-procedure vital signs reviewed and stable  Report to RN Given: handoff report given  Patient transferred to: PACU    Handoff Report: Identifed the Patient, Identified the Reponsible Provider, Reviewed the pertinent medical history, Discussed the surgical course, Reviewed Intra-OP anesthesia mangement and issues during anesthesia, Set expectations for post-procedure period and Allowed opportunity for questions and acknowledgement of understanding      Vitals:  Vitals Value Taken Time   /68    Temp 97.8    Pulse 60    Resp 15    SpO2 100 % 02/01/24 1014   Vitals shown include unfiled device data.    Electronically Signed By: NEVILLE Johnson CRNA  February 1, 2024  10:15 AM

## 2024-02-01 NOTE — DISCHARGE INSTRUCTIONS
Contacting your Doctor -   To contact a doctor, call Dr Mitchell at  the ENT- Otolaryngology Clinic at [phone]  or:  [phone] and ask for the resident on call for ENT-Otolaryngology (answered 24 hours a day)   Emergency Department:  [facility]: [phone]  [facility]: [phone] if you are in need of immediate or emergent help

## 2024-02-02 ENCOUNTER — PATIENT OUTREACH (OUTPATIENT)
Dept: OTOLARYNGOLOGY | Facility: CLINIC | Age: 69
End: 2024-02-02
Payer: COMMERCIAL

## 2024-02-02 NOTE — PROGRESS NOTES
Called patient wife to check in on how patient was doing after his appointment. Patient is doing well and denied having pain or concerns after procedure. Confirmed post op and asked patient to reach out with any other questions or concerns in the meantime. Magui Mar RN on 2/2/2024 at 10:15 AM

## 2024-02-07 ENCOUNTER — DOCUMENTATION ONLY (OUTPATIENT)
Dept: OTHER | Facility: CLINIC | Age: 69
End: 2024-02-07
Payer: COMMERCIAL

## 2024-02-19 ENCOUNTER — OFFICE VISIT (OUTPATIENT)
Dept: OTOLARYNGOLOGY | Facility: CLINIC | Age: 69
End: 2024-02-19
Payer: COMMERCIAL

## 2024-02-19 VITALS
HEIGHT: 74 IN | HEART RATE: 83 BPM | WEIGHT: 200.6 LBS | SYSTOLIC BLOOD PRESSURE: 108 MMHG | BODY MASS INDEX: 25.74 KG/M2 | OXYGEN SATURATION: 96 % | DIASTOLIC BLOOD PRESSURE: 76 MMHG

## 2024-02-19 DIAGNOSIS — Z78.9 RECURRENT RESPIRATORY PAPILLOMATOSIS: ICD-10-CM

## 2024-02-19 DIAGNOSIS — Q31.0 ANTERIOR GLOTTIC WEB: ICD-10-CM

## 2024-02-19 DIAGNOSIS — F02.80 LEWY BODY DEMENTIA, UNSPECIFIED DEMENTIA SEVERITY, UNSPECIFIED WHETHER BEHAVIORAL, PSYCHOTIC, OR MOOD DISTURBANCE OR ANXIETY (H): ICD-10-CM

## 2024-02-19 DIAGNOSIS — G31.83 LEWY BODY DEMENTIA, UNSPECIFIED DEMENTIA SEVERITY, UNSPECIFIED WHETHER BEHAVIORAL, PSYCHOTIC, OR MOOD DISTURBANCE OR ANXIETY (H): ICD-10-CM

## 2024-02-19 DIAGNOSIS — R49.0 DYSPHONIA: Primary | ICD-10-CM

## 2024-02-19 PROCEDURE — 99207 PR NO CHARGE LOS: CPT | Mod: 25 | Performed by: OTOLARYNGOLOGY

## 2024-02-19 PROCEDURE — 31579 LARYNGOSCOPY TELESCOPIC: CPT | Performed by: OTOLARYNGOLOGY

## 2024-02-19 ASSESSMENT — PAIN SCALES - GENERAL: PAINLEVEL: NO PAIN (0)

## 2024-02-19 NOTE — PATIENT INSTRUCTIONS
1.  You were seen in the ENT Clinic today by . If you have any questions or concerns after your appointment, please call [phone]. Press option #1 for scheduling related needs. Press option #3 for Nurse advice.    2. Plan is to return to clinic in 6 months      Shey Rubio LPN  [phone]  [facility] - Otolaryngology

## 2024-02-19 NOTE — LETTER
2/19/2024      RE: Cleveland Rodríguez  [address] [place] [address]       Dear Colleague:  Cleveland Rodríguez recently returned for follow-up at the [facility]. My clinic note from our visit is enclosed below.  Speech recognition software may have been used in the documentation below; input is reviewed before signature to the best of my ability.     I appreciate the ongoing opportunity to participate in this patient's care.    Please feel free to contact me with any questions.      Sincerely yours,  Betty Mitchell M.D., M.P.H.  , Laryngology  Director, [facility]  Otolaryngology- Head & Neck Surgery  [phone]            =====  HISTORY OF PRESENT ILLNESS:  Cleveland Rodríguez is a pleasant 68 year old male with recurrent respiratory papillomatosis (RRP), HPV6+, as well as mild cognitive impairment (Lewy body dementia), who returns in follow up today. He has a history of numerous operating room procedures for this.     His most recent OR procedure was 2/1/24. Pathology showed benign squamous papilloma.  His most recent procedure under local anesthesia was an awake KTP laryngoscopy in 09/06/2019.       Today's updates:  - voice is feeling good  - swallowing/breathing fine  - no problems with recovery from Anesthesia  - no new PMH/PSH  - per wife, has noisy breathing during sleep, not clear if there are pauses; patient is not open to pursuing sleep study at present but would reconsider if things get worse        MEDICATIONS:     Current Outpatient Medications   Medication Sig Dispense Refill     aspirin 81 MG tablet Take 1 tablet by mouth daily        atorvastatin (LIPITOR) 20 MG tablet TAKE 1 TABLET BY MOUTH EVERY DAY (Patient taking differently: Take 20 mg by mouth daily) 90 tablet 3     donepezil (ARICEPT) 10 MG tablet At Bedtime        doxycycline hyclate (VIBRAMYCIN) 100 MG capsule Take 1 capsule (100 mg) by mouth 2 times daily 5 capsule 0      escitalopram (LEXAPRO) 20 MG tablet Take 20 mg by mouth daily       fluticasone (FLONASE) 50 MCG/ACT nasal spray Spray 1 spray into both nostrils daily 48 g 0     GARLIC OIL PO Take by mouth daily        KRILL OIL PO Take by mouth daily        metroNIDAZOLE (METROGEL) 1 % gel Apply 1 g topically daily apply hs for Rosacea 60 g 11     Multiple Vitamin (DAILY MULTIVITAMIN PO) Take 1 tablet by mouth 2 times daily       nortriptyline (PAMELOR) 10 MG capsule Take 10 mg by mouth at bedtime       Omega-3 Fatty Acids (OMEGA-3 FISH OIL PO) Take by mouth daily        QUEtiapine (SEROQUEL) 25 MG tablet Take 25 mg by mouth daily         ALLERGIES:    Allergies   Allergen Reactions     Amoxicillin Hives              NEW PMH/PSH: None    REVIEW OF SYSTEMS:  The patient completed a comprehensive 11 point review of systems (below), which was reviewed. Positives are as noted below.  Patient Supplied Answers to Review of Systems      12/18/2023     8:13 AM   [facility] ENT ROS   Constitutional Weight loss    Appetite change   Neurology Headache   Psychology Frequently feeling anxious   Ears, Nose, Throat Nasal congestion or drainage    Hoarseness   Cardiopulmonary Cough    Chest pain   Gastrointestinal/Genitourinary Heartburn/indigestion    Diarrhea   Musculoskeletal Sore or stiff joints   Allergy/Immunology Skin changes   Endocrine Frequent urination            PHYSICAL EXAM:  General: The patient was alert and conversant, and in no acute distress.    Oral cavity/oropharynx: No masses or lesions. Dentition unchanged since prior. Tongue mobility and palate elevation intact and symmetric.  Neck: No palpable cervical lymphadenopathy, no significant tenderness to palpation of the thyrohyoid space, which was narrow. No obvious thyroid abnormality.  Resp: Breathing comfortably, no stridor or stertor.  Neuro: Symmetric facial function. Other cranial nerve function as documented above.  Psych: Normal affect, pleasant and  cooperative.  Voice/speech: No significant dysphonia.      Procedure:   Flexible fiberoptic laryngoscopy and laryngovideostroboscopy  Indications: This procedure was warranted to evaluate the patient's laryngeal anatomy and function. Risks, benefits, and alternatives were discussed.  Description: After written informed consent was obtained, a time-out was performed to confirm patient identity, procedure, and procedure site. Topical 2% lidocaine and oxymetazoline were applied to the nasal cavities. I performed the endoscopy and no complications were apparent. Continuous and stroboscopic light were utilized to assess routine phonation and variable frequency phonation. Patient with limited tolerance, but did complete the key portions of the exam.  Performed by: Betty Mitchell MD MPH  Findings: Normal nasopharynx. Normal base of tongue, valleculae, and epiglottis. Vocal fold mobility: right: normal; left: normal. Medial edges of the true vocal folds: smooth and straight overall.  Small amount of residual papilloma along left anterior true vocal fold, as expected. Stable pre-existing anterior inferior infraglottic web. Mucosa of false vocal folds, aryepiglottic folds, piriform sinuses, and posterior glottis unremarkable. Stable posterior laryngeal blunting bilaterally. Airway was patent.  No further findings on NBI.    The addition of stroboscopy allowed evaluation of the mucosal wave.   Amplitude: right: mildly decreased; left: mildly decreased. Symmetry: intermittent symmetry. Closure pattern: complete. Closure plane: at glottic level. Phase distribution: normal.                                      IMPRESSION AND PLAN:   Cleveland Rodríguez returns with a good post-operative result. He is doing well from a voice perspective and is happy with this. He does continue to have difficulty tolerating the laryngeal exam related to dementia, but did well overall today with encouragement and coaching.    Plan: RTC 6 mo or  sooner as needed. If he is vocally doing very well at that time, can consider postponing a few months.    I spent a total of 18 minutes on 2/19/2024 in chart review, review of tests, patient visit, documentation, care coordination, and/or discussion with other providers about the issues documented above, separate from any documented procedure(s).      Betty Mitchell MD

## 2024-02-19 NOTE — PROGRESS NOTES
Dear Colleague:  Cleveland Rodríguez recently returned for follow-up at the [facility]. My clinic note from our visit is enclosed below.  Speech recognition software may have been used in the documentation below; input is reviewed before signature to the best of my ability.     I appreciate the ongoing opportunity to participate in this patient's care.    Please feel free to contact me with any questions.      Sincerely yours,  Betty Mitchell M.D., M.P.H.  , Laryngology  Director, [facility]  Otolaryngology- Head & Neck Surgery  [phone]            =====  HISTORY OF PRESENT ILLNESS:  Cleveland Rodríguez is a pleasant 68 year old male with recurrent respiratory papillomatosis (RRP), HPV6+, as well as mild cognitive impairment (Lewy body dementia), who returns in follow up today. He has a history of numerous operating room procedures for this.     His most recent OR procedure was 2/1/24. Pathology showed benign squamous papilloma.  His most recent procedure under local anesthesia was an awake KTP laryngoscopy in 09/06/2019.       Today's updates:  - voice is feeling good  - swallowing/breathing fine  - no problems with recovery from Anesthesia  - no new PMH/PSH  - per wife, has noisy breathing during sleep, not clear if there are pauses; patient is not open to pursuing sleep study at present but would reconsider if things get worse        MEDICATIONS:     Current Outpatient Medications   Medication Sig Dispense Refill    aspirin 81 MG tablet Take 1 tablet by mouth daily       atorvastatin (LIPITOR) 20 MG tablet TAKE 1 TABLET BY MOUTH EVERY DAY (Patient taking differently: Take 20 mg by mouth daily) 90 tablet 3    donepezil (ARICEPT) 10 MG tablet At Bedtime       doxycycline hyclate (VIBRAMYCIN) 100 MG capsule Take 1 capsule (100 mg) by mouth 2 times daily 5 capsule 0    escitalopram (LEXAPRO) 20 MG tablet Take 20 mg by mouth daily      fluticasone  (FLONASE) 50 MCG/ACT nasal spray Spray 1 spray into both nostrils daily 48 g 0    GARLIC OIL PO Take by mouth daily       KRILL OIL PO Take by mouth daily       metroNIDAZOLE (METROGEL) 1 % gel Apply 1 g topically daily apply hs for Rosacea 60 g 11    Multiple Vitamin (DAILY MULTIVITAMIN PO) Take 1 tablet by mouth 2 times daily      nortriptyline (PAMELOR) 10 MG capsule Take 10 mg by mouth at bedtime      Omega-3 Fatty Acids (OMEGA-3 FISH OIL PO) Take by mouth daily       QUEtiapine (SEROQUEL) 25 MG tablet Take 25 mg by mouth daily         ALLERGIES:    Allergies   Allergen Reactions    Amoxicillin Hives              NEW PMH/PSH: None    REVIEW OF SYSTEMS:  The patient completed a comprehensive 11 point review of systems (below), which was reviewed. Positives are as noted below.  Patient Supplied Answers to Review of Systems      12/18/2023     8:13 AM   [facility] ROS   Constitutional Weight loss    Appetite change   Neurology Headache   Psychology Frequently feeling anxious   Ears, Nose, Throat Nasal congestion or drainage    Hoarseness   Cardiopulmonary Cough    Chest pain   Gastrointestinal/Genitourinary Heartburn/indigestion    Diarrhea   Musculoskeletal Sore or stiff joints   Allergy/Immunology Skin changes   Endocrine Frequent urination            PHYSICAL EXAM:  General: The patient was alert and conversant, and in no acute distress.    Oral cavity/oropharynx: No masses or lesions. Dentition unchanged since prior. Tongue mobility and palate elevation intact and symmetric.  Neck: No palpable cervical lymphadenopathy, no significant tenderness to palpation of the thyrohyoid space, which was narrow. No obvious thyroid abnormality.  Resp: Breathing comfortably, no stridor or stertor.  Neuro: Symmetric facial function. Other cranial nerve function as documented above.  Psych: Normal affect, pleasant and cooperative.  Voice/speech: No significant dysphonia.      Procedure:   Flexible fiberoptic laryngoscopy and  laryngovideostroboscopy  Indications: This procedure was warranted to evaluate the patient's laryngeal anatomy and function. Risks, benefits, and alternatives were discussed.  Description: After written informed consent was obtained, a time-out was performed to confirm patient identity, procedure, and procedure site. Topical 2% lidocaine and oxymetazoline were applied to the nasal cavities. I performed the endoscopy and no complications were apparent. Continuous and stroboscopic light were utilized to assess routine phonation and variable frequency phonation. Patient with limited tolerance, but did complete the key portions of the exam.  Performed by: Betty Mitchell MD MPH  Findings: Normal nasopharynx. Normal base of tongue, valleculae, and epiglottis. Vocal fold mobility: right: normal; left: normal. Medial edges of the true vocal folds: smooth and straight overall.  Small amount of residual papilloma along left anterior true vocal fold, as expected. Stable pre-existing anterior inferior infraglottic web. Mucosa of false vocal folds, aryepiglottic folds, piriform sinuses, and posterior glottis unremarkable. Stable posterior laryngeal blunting bilaterally. Airway was patent.  No further findings on NBI.    The addition of stroboscopy allowed evaluation of the mucosal wave.   Amplitude: right: mildly decreased; left: mildly decreased. Symmetry: intermittent symmetry. Closure pattern: complete. Closure plane: at glottic level. Phase distribution: normal.                                      IMPRESSION AND PLAN:   Cleveland Rodríguez returns with a good post-operative result. He is doing well from a voice perspective and is happy with this. He does continue to have difficulty tolerating the laryngeal exam related to dementia, but did well overall today with encouragement and coaching.    Plan: RTC 6 mo or sooner as needed. If he is vocally doing very well at that time, can consider postponing a few months.    I  spent a total of 18 minutes on 2/19/2024 in chart review, review of tests, patient visit, documentation, care coordination, and/or discussion with other providers about the issues documented above, separate from any documented procedure(s).

## 2024-08-26 ENCOUNTER — OFFICE VISIT (OUTPATIENT)
Dept: OTOLARYNGOLOGY | Facility: CLINIC | Age: 69
End: 2024-08-26
Payer: COMMERCIAL

## 2024-08-26 VITALS
DIASTOLIC BLOOD PRESSURE: 82 MMHG | WEIGHT: 213 LBS | HEIGHT: 74 IN | SYSTOLIC BLOOD PRESSURE: 129 MMHG | BODY MASS INDEX: 27.34 KG/M2 | HEART RATE: 75 BPM

## 2024-08-26 DIAGNOSIS — Q31.0 ANTERIOR GLOTTIC WEB: ICD-10-CM

## 2024-08-26 DIAGNOSIS — R49.0 DYSPHONIA: Primary | ICD-10-CM

## 2024-08-26 DIAGNOSIS — F02.80 LEWY BODY DEMENTIA, UNSPECIFIED DEMENTIA SEVERITY, UNSPECIFIED WHETHER BEHAVIORAL, PSYCHOTIC, OR MOOD DISTURBANCE OR ANXIETY (H): ICD-10-CM

## 2024-08-26 DIAGNOSIS — G31.83 LEWY BODY DEMENTIA, UNSPECIFIED DEMENTIA SEVERITY, UNSPECIFIED WHETHER BEHAVIORAL, PSYCHOTIC, OR MOOD DISTURBANCE OR ANXIETY (H): ICD-10-CM

## 2024-08-26 DIAGNOSIS — Z78.9 RECURRENT RESPIRATORY PAPILLOMATOSIS: ICD-10-CM

## 2024-08-26 PROCEDURE — 31575 DIAGNOSTIC LARYNGOSCOPY: CPT | Performed by: OTOLARYNGOLOGY

## 2024-08-26 PROCEDURE — 99213 OFFICE O/P EST LOW 20 MIN: CPT | Mod: 25 | Performed by: OTOLARYNGOLOGY

## 2024-08-26 NOTE — PATIENT INSTRUCTIONS
1.  You were seen in the ENT Clinic today by . If you have any questions or concerns after your appointment, please call [phone]. Press option #1 for scheduling related needs. Press option #3 for Nurse advice.    2.   has recommended the following:   - consider allergy referral. Reach out if you are interested in this.   - discuss sleep study with your primary care physician    3.  Plan is to return to clinic in 6 months      Shey Rubio LPN  [phone]  [facility] - Otolaryngology

## 2024-08-26 NOTE — PROGRESS NOTES
Dear Colleague:  Cleveland Rodríguez recently returned for follow-up at the [facility]. My clinic note from our visit is enclosed below.  Speech recognition software may have been used in the documentation below; input is reviewed before signature to the best of my ability.     I appreciate the ongoing opportunity to participate in this patient's care.    Please feel free to contact me with any questions.      Sincerely yours,  Betty Mitchell M.D., M.P.H.  , Laryngology  Director, [facility]  Otolaryngology- Head & Neck Surgery  [phone]            =====  HISTORY OF PRESENT ILLNESS:  Cleveland Rodríguez is a pleasant 68 year old male with  recurrent respiratory papillomatosis (RRP), HPV6+, as well as mild cognitive impairment (Lewy body dementia), who returns in follow up today. He has a history of numerous operating room procedures for this.     His most recent OR procedure was 2/1/24. Pathology showed benign squamous papilloma.  His most recent procedure under local anesthesia was an awake KTP laryngoscopy in 09/06/2019.     Today's updates:  - voice has been rougher over the past month or so  - swallow and breathing are stable  - his wife reports he does have heavy breathing and pauses in breathing during sleep  - no new PMH/PSH      MEDICATIONS:     Current Outpatient Medications   Medication Sig Dispense Refill    aspirin 81 MG tablet Take 1 tablet by mouth daily       atorvastatin (LIPITOR) 20 MG tablet TAKE 1 TABLET BY MOUTH EVERY DAY (Patient taking differently: Take 20 mg by mouth daily.) 90 tablet 3    donepezil (ARICEPT) 10 MG tablet At Bedtime       escitalopram (LEXAPRO) 20 MG tablet Take 20 mg by mouth daily      metroNIDAZOLE (METROGEL) 1 % gel Apply 1 g topically daily apply hs for Rosacea 60 g 11    nortriptyline (PAMELOR) 10 MG capsule Take 10 mg by mouth at bedtime      QUEtiapine (SEROQUEL) 25 MG tablet Take 25 mg by mouth daily       doxycycline hyclate (VIBRAMYCIN) 100 MG capsule Take 1 capsule (100 mg) by mouth 2 times daily 5 capsule 0    fluticasone (FLONASE) 50 MCG/ACT nasal spray Spray 1 spray into both nostrils daily 48 g 0    GARLIC OIL PO Take by mouth daily       KRILL OIL PO Take by mouth daily       Multiple Vitamin (DAILY MULTIVITAMIN PO) Take 1 tablet by mouth 2 times daily      Omega-3 Fatty Acids (OMEGA-3 FISH OIL PO) Take by mouth daily          ALLERGIES:    Allergies   Allergen Reactions    Amoxicillin Hives              NEW PMH/PSH: None    REVIEW OF SYSTEMS:  The patient completed a comprehensive 11 point review of systems (below), which was reviewed. Positives are as noted below.  Patient Supplied Answers to Review of Systems      8/26/2024     7:53 AM   [facility] ROS   Cardiopulmonary Cough    Breathing problems    Chest pain   Musculoskeletal Sore or stiff joints   Allergy/Immunology Allergies or hay fever   Endocrine Heat or cold intolerance       PHYSICAL EXAM:  General: The patient was alert and conversant, and in no acute distress. Patient accompanied by his spouse.  Oral cavity/oropharynx: No masses or lesions. Dentition unchanged since prior. Tongue mobility and palate elevation intact and symmetric.  Neck: No palpable cervical lymphadenopathy, no significant tenderness to palpation of the thyrohyoid space, which was narrow. No obvious thyroid abnormality.  Resp: Breathing comfortably, no stridor or stertor.  Neuro: Symmetric facial function. Other cranial nerve function as documented above.  Psych: Normal affect, pleasant and cooperative.  Voice/speech: Mild dysphonia characterized by glottal guerrero.        Procedure:   Flexible fiberoptic laryngoscopy  Indications: This procedure was warranted to evaluate the patient's laryngeal anatomy and function. Risks, benefits, and alternatives were discussed.  Description: After written informed consent was obtained, a time-out was performed to confirm patient identity,  procedure, and procedure site. Topical 2% lidocaine and oxymetazoline were applied to the nasal cavities. I performed the endoscopy and no complications were apparent.  Performed by: Betty Mitchell MD MPH  SLP: Lety Garcia MS CCC-SLP   Findings: Normal nasopharynx. Normal base of tongue, valleculae, and epiglottis. The right true vocal fold demonstrated normal mobility. The left true vocal fold demonstrated normal mobility. Stable anterior web, stable posterior glottic web. The medial edges of the true vocal folds appeared smooth and straight. Mild bilateral posterior supraglottic papilloma regrowth, R>L.Glissade was not assessable due to difficulty completing tasks. Mucosa of the false vocal folds, aryepiglottic folds, piriform sinuses, and posterior glottis unremarkable. Airway was patent. Similar findings on NBI.                       IMPRESSION AND PLAN:   Cleveland Rodríguez returns with modest papilloma regrowth. His dysphonia is likely more related to allergies and functional dysphonia. He tolerated today's exam with encouragement and support.    Plan:  -offered voice therapy; patient declined  -allergies may be contributing; discussed antihistamines and Flonase; if needs allergist referral will let me know  -will discuss sleep study with primary care provider; he seemed reluctant but stated he would do it if he needed to  -RTC 6 months, or sooner as needed    I spent a total of 28 minutes on 8/26/2024 in chart review, review of tests, patient visit, documentation, care coordination, and/or discussion with other providers about the issues documented above, separate from any documented procedure(s).

## 2024-08-26 NOTE — LETTER
8/26/2024      RE: Cleveland Rodríguez  [address]  [place] [address]       Dear Colleague:  Cleveland Rodríguez recently returned for follow-up at the [facility]. My clinic note from our visit is enclosed below.  Speech recognition software may have been used in the documentation below; input is reviewed before signature to the best of my ability.     I appreciate the ongoing opportunity to participate in this patient's care.    Please feel free to contact me with any questions.      Sincerely yours,  Betty Mitchell M.D., M.P.H.  , Laryngology  Director, [facility]  Otolaryngology- Head & Neck Surgery  [phone]            =====  HISTORY OF PRESENT ILLNESS:  Cleveland Rodríguez is a pleasant 68 year old male with  recurrent respiratory papillomatosis (RRP), HPV6+, as well as mild cognitive impairment (Lewy body dementia), who returns in follow up today. He has a history of numerous operating room procedures for this.     His most recent OR procedure was 2/1/24. Pathology showed benign squamous papilloma.  His most recent procedure under local anesthesia was an awake KTP laryngoscopy in 09/06/2019.     Today's updates:  - voice has been rougher over the past month or so  - swallow and breathing are stable  - his wife reports he does have heavy breathing and pauses in breathing during sleep  - no new PMH/PSH      MEDICATIONS:     Current Outpatient Medications   Medication Sig Dispense Refill     aspirin 81 MG tablet Take 1 tablet by mouth daily        atorvastatin (LIPITOR) 20 MG tablet TAKE 1 TABLET BY MOUTH EVERY DAY (Patient taking differently: Take 20 mg by mouth daily.) 90 tablet 3     donepezil (ARICEPT) 10 MG tablet At Bedtime        escitalopram (LEXAPRO) 20 MG tablet Take 20 mg by mouth daily       metroNIDAZOLE (METROGEL) 1 % gel Apply 1 g topically daily apply hs for Rosacea 60 g 11     nortriptyline (PAMELOR) 10 MG capsule Take  10 mg by mouth at bedtime       QUEtiapine (SEROQUEL) 25 MG tablet Take 25 mg by mouth daily       doxycycline hyclate (VIBRAMYCIN) 100 MG capsule Take 1 capsule (100 mg) by mouth 2 times daily 5 capsule 0     fluticasone (FLONASE) 50 MCG/ACT nasal spray Spray 1 spray into both nostrils daily 48 g 0     GARLIC OIL PO Take by mouth daily        KRILL OIL PO Take by mouth daily        Multiple Vitamin (DAILY MULTIVITAMIN PO) Take 1 tablet by mouth 2 times daily       Omega-3 Fatty Acids (OMEGA-3 FISH OIL PO) Take by mouth daily          ALLERGIES:    Allergies   Allergen Reactions     Amoxicillin Hives              NEW PMH/PSH: None    REVIEW OF SYSTEMS:  The patient completed a comprehensive 11 point review of systems (below), which was reviewed. Positives are as noted below.  Patient Supplied Answers to Review of Systems      8/26/2024     7:53 AM   [facility] ROS   Cardiopulmonary Cough    Breathing problems    Chest pain   Musculoskeletal Sore or stiff joints   Allergy/Immunology Allergies or hay fever   Endocrine Heat or cold intolerance       PHYSICAL EXAM:  General: The patient was alert and conversant, and in no acute distress. Patient accompanied by his spouse.  Oral cavity/oropharynx: No masses or lesions. Dentition unchanged since prior. Tongue mobility and palate elevation intact and symmetric.  Neck: No palpable cervical lymphadenopathy, no significant tenderness to palpation of the thyrohyoid space, which was narrow. No obvious thyroid abnormality.  Resp: Breathing comfortably, no stridor or stertor.  Neuro: Symmetric facial function. Other cranial nerve function as documented above.  Psych: Normal affect, pleasant and cooperative.  Voice/speech: Mild dysphonia characterized by glottal guerrero.        Procedure:   Flexible fiberoptic laryngoscopy  Indications: This procedure was warranted to evaluate the patient's laryngeal anatomy and function. Risks, benefits, and alternatives were discussed.  Description:  After written informed consent was obtained, a time-out was performed to confirm patient identity, procedure, and procedure site. Topical 2% lidocaine and oxymetazoline were applied to the nasal cavities. I performed the endoscopy and no complications were apparent.  Performed by: Betty Mitchell MD MPH  SLP: Lety Garcia MS CCC-SLP   Findings: Normal nasopharynx. Normal base of tongue, valleculae, and epiglottis. The right true vocal fold demonstrated normal mobility. The left true vocal fold demonstrated normal mobility. Stable anterior web, stable posterior glottic web. The medial edges of the true vocal folds appeared smooth and straight. Mild bilateral posterior supraglottic papilloma regrowth, R>L.Glissade was not assessable due to difficulty completing tasks. Mucosa of the false vocal folds, aryepiglottic folds, piriform sinuses, and posterior glottis unremarkable. Airway was patent. Similar findings on NBI.                       IMPRESSION AND PLAN:   Cleveland Rodríguez returns with modest papilloma regrowth. His dysphonia is likely more related to allergies and functional dysphonia. He tolerated today's exam with encouragement and support.    Plan:  -offered voice therapy; patient declined  -allergies may be contributing; discussed antihistamines and Flonase; if needs allergist referral will let me know  -will discuss sleep study with primary care provider; he seemed reluctant but stated he would do it if he needed to  -RTC 6 months, or sooner as needed    I spent a total of 28 minutes on 8/26/2024 in chart review, review of tests, patient visit, documentation, care coordination, and/or discussion with other providers about the issues documented above, separate from any documented procedure(s).      Betty Mitchell MD

## 2025-02-24 ENCOUNTER — PATIENT OUTREACH (OUTPATIENT)
Dept: OTOLARYNGOLOGY | Facility: CLINIC | Age: 70
End: 2025-02-24

## 2025-02-24 NOTE — PROGRESS NOTES
Called patient regarding visit today. Per patient wife she tried to reschedule but it did not go through. Writer helped patient wife reschedule, and asked they all back with any other questions or concerns. Patient wife was agreeable and verbalized understanding of the situation. Magui Peterson RN on 2/24/2025 at 10:31 AM

## 2025-03-10 ENCOUNTER — OFFICE VISIT (OUTPATIENT)
Dept: OTOLARYNGOLOGY | Facility: CLINIC | Age: 70
End: 2025-03-10
Payer: COMMERCIAL

## 2025-03-10 VITALS
WEIGHT: 219 LBS | SYSTOLIC BLOOD PRESSURE: 98 MMHG | DIASTOLIC BLOOD PRESSURE: 57 MMHG | HEART RATE: 99 BPM | BODY MASS INDEX: 28.11 KG/M2 | HEIGHT: 74 IN

## 2025-03-10 DIAGNOSIS — G31.83 LEWY BODY DEMENTIA, UNSPECIFIED DEMENTIA SEVERITY, UNSPECIFIED WHETHER BEHAVIORAL, PSYCHOTIC, OR MOOD DISTURBANCE OR ANXIETY (H): ICD-10-CM

## 2025-03-10 DIAGNOSIS — F02.80 LEWY BODY DEMENTIA, UNSPECIFIED DEMENTIA SEVERITY, UNSPECIFIED WHETHER BEHAVIORAL, PSYCHOTIC, OR MOOD DISTURBANCE OR ANXIETY (H): ICD-10-CM

## 2025-03-10 DIAGNOSIS — R49.0 DYSPHONIA: Primary | ICD-10-CM

## 2025-03-10 DIAGNOSIS — G47.9 SLEEP DISTURBANCE: ICD-10-CM

## 2025-03-10 DIAGNOSIS — Z78.9 RECURRENT RESPIRATORY PAPILLOMATOSIS: ICD-10-CM

## 2025-03-10 DIAGNOSIS — Q31.0 ANTERIOR GLOTTIC WEB: ICD-10-CM

## 2025-03-10 RX ORDER — TRAZODONE HYDROCHLORIDE 50 MG/1
50 TABLET ORAL AT BEDTIME
COMMUNITY
Start: 2025-03-03

## 2025-03-10 RX ORDER — MEMANTINE HYDROCHLORIDE 10 MG/1
10 TABLET ORAL 2 TIMES DAILY
COMMUNITY

## 2025-03-10 NOTE — PATIENT INSTRUCTIONS
1.  You were seen in the ENT Clinic today by . If you have any questions or concerns after your appointment, please call [phone]. Press option #1 for scheduling related needs. Press option #3 for Nurse advice.    2.   has recommended the following:   - consider voice therapy   - discuss sleep symptoms with PCP    3.  Plan is to return to clinic in 6 months      Shey Rubio LPN  [phone]  [facility] - Otolaryngology

## 2025-03-10 NOTE — LETTER
3/10/2025      RE: Cleveland Rodríguez  [address]  [place] [address]       Dear Colleague:  Cleveland Rodríguez recently returned for follow-up at the [facility]. My clinic note from our visit is enclosed below.  Speech recognition software may have been used in the documentation below; input is reviewed before signature to the best of my ability.     I appreciate the ongoing opportunity to participate in this patient's care.    Please feel free to contact me with any questions.      Sincerely yours,  Betty Mitchell M.D., M.P.H.  , Laryngology  Director, [facility]  Otolaryngology- Head & Neck Surgery  [phone]            =====  HISTORY OF PRESENT ILLNESS:  Cleveland Rodríguez is a pleasant 69 year old male with recurrent respiratory papillomatosis (RRP), HPV6+, as well as cognitive impairment (Lewy body dementia), who returns in follow up today. He has a history of numerous operating room procedures for this.     His most recent OR procedure was 2/1/24. Pathology showed benign squamous papilloma.  His most recent procedure under local anesthesia was an awake KTP laryngoscopy in 09/06/2019.     Today's updates:  - Has not pursued sleep study; his wife still hears him gasping at night sometimes  - Allergies are doing fine  - Voice is maybe a little worse, not all that bothered  - Breathing, swallowing are doing fine  - No new PMH/PSH, but does have upcoming Mohs surgery for basal cell carcinomas of the skin (nose)      MEDICATIONS:     Current Outpatient Medications   Medication Sig Dispense Refill     aspirin 81 MG tablet Take 1 tablet by mouth daily        atorvastatin (LIPITOR) 20 MG tablet TAKE 1 TABLET BY MOUTH EVERY DAY (Patient taking differently: Take 20 mg by mouth daily.) 90 tablet 3     donepezil (ARICEPT) 10 MG tablet At Bedtime        doxycycline hyclate (VIBRAMYCIN) 100 MG capsule Take 1 capsule (100 mg) by mouth 2 times  daily 5 capsule 0     escitalopram (LEXAPRO) 20 MG tablet Take 20 mg by mouth daily       fluticasone (FLONASE) 50 MCG/ACT nasal spray Spray 1 spray into both nostrils daily 48 g 0     GARLIC OIL PO Take by mouth daily        KRILL OIL PO Take by mouth daily        metroNIDAZOLE (METROGEL) 1 % gel Apply 1 g topically daily apply hs for Rosacea 60 g 11     Multiple Vitamin (DAILY MULTIVITAMIN PO) Take 1 tablet by mouth 2 times daily       nortriptyline (PAMELOR) 10 MG capsule Take 10 mg by mouth at bedtime       Omega-3 Fatty Acids (OMEGA-3 FISH OIL PO) Take by mouth daily        QUEtiapine (SEROQUEL) 25 MG tablet Take 25 mg by mouth daily       traZODone (DESYREL) 50 MG tablet Take 50 mg by mouth at bedtime.       memantine (NAMENDA) 10 MG tablet Take 10 mg by mouth 2 times daily.         ALLERGIES:    Allergies   Allergen Reactions     Amoxicillin Hives              NEW PMH/PSH: As above    REVIEW OF SYSTEMS:  The patient completed a comprehensive 11 point review of systems (below), which was reviewed. Positives are as noted below.  Patient Supplied Answers to Review of Systems Noncontributory     PHYSICAL EXAM:  General: The patient was alert and conversant, and in no acute distress. Patient accompanied by his spouse.  Oral cavity/oropharynx: No masses or lesions. Dentition unchanged since prior. Tongue mobility and palate elevation intact and symmetric.  Neck: No palpable cervical lymphadenopathy, no significant tenderness to palpation of the thyrohyoid space, which was narrow. No obvious thyroid abnormality.  Resp: Breathing comfortably, no stridor or stertor.  Neuro: Symmetric facial function. Other cranial nerve function as documented above.  Psych: Normal affect, pleasant and cooperative. Having some word-finding difficulty today, and sometimes had some slightly non-linear responses.  Voice/speech: Mild dysphonia characterized by breathiness, roughness, and strain.      Procedure:   Flexible fiberoptic  laryngoscopy  Indications: This procedure was warranted to evaluate the patient's laryngeal anatomy and function. Risks, benefits, and alternatives were discussed.  Description: After written informed consent was obtained, a time-out was performed to confirm patient identity, procedure, and procedure site. Topical 2% lidocaine and oxymetazoline were applied to the nasal cavities. I performed the endoscopy and no complications were apparent.  Performed by: Betty Mitchell MD MPH  Findings: Normal nasopharynx. Normal base of tongue, valleculae, and epiglottis. The right true vocal fold demonstrated normal mobility. The left true vocal fold demonstrated normal mobility. Stable posterior web. The medial edges of the true vocal folds appeared smooth overall. Right posterior true vocal fold with cluster of papilloma, not affecting airway patency. Left true vocal fold with presumed mild sessile papilloma. Stable anterior glottic web. Glissade was not assessable due to difficulty completing tasks. There was mild to moderate supraglottic recruitment with connected speech. Mucosa of the false vocal folds, aryepiglottic folds, piriform sinuses, and posterior glottis unremarkable except for cluster of papilloma along mid posterior glottis, non-obstructive. Airway was patent. Similar findings on virtual chromoendoscopy, papilloma highlighted. Stroboscopy was not performed due to limited patient tolerance.                              IMPRESSION AND PLAN:   Cleveland Rodríguez returns with an essentially stable exam; there is perhaps a little more prominent papilloma along the right posterior and mid posterior larynx, but this is not significantly affecting his voice or breathing. Given his significant dementia we are aiming to take as noninvasive an approach as possible.     Plan:  1) RTC 6 months or sooner as needed; encouraged his wife to reach out to as needed too. If his voice and/or breathing worsen, we will consider  planning for a repeat MDL.  2) Could consider voice therapy; he is open to considering it more in the future if needed.  3) Encouraged them again to discuss his sleep symptoms with his primary care provider. He was not interested in discussing a sleep study at our visit.    I spent a total of 24 minutes on 3/10/2025 in chart review, review of tests, patient visit, documentation, care coordination, and/or discussion with other providers about the issues documented above, separate from any documented procedure(s).      Betty Mitchell MD

## 2025-03-10 NOTE — PROGRESS NOTES
Dear Colleague:  Cleveland Rodríguez recently returned for follow-up at the [facility]. My clinic note from our visit is enclosed below.  Speech recognition software may have been used in the documentation below; input is reviewed before signature to the best of my ability.     I appreciate the ongoing opportunity to participate in this patient's care.    Please feel free to contact me with any questions.      Sincerely yours,  Betty Mitchell M.D., M.P.H.  , Laryngology  Director, [facility]  Otolaryngology- Head & Neck Surgery  [phone]            =====  HISTORY OF PRESENT ILLNESS:  Cleveland Rodríguez is a pleasant 69 year old male with recurrent respiratory papillomatosis (RRP), HPV6+, as well as cognitive impairment (Lewy body dementia), who returns in follow up today. He has a history of numerous operating room procedures for this.     His most recent OR procedure was 2/1/24. Pathology showed benign squamous papilloma.  His most recent procedure under local anesthesia was an awake KTP laryngoscopy in 09/06/2019.     Today's updates:  - Has not pursued sleep study; his wife still hears him gasping at night sometimes  - Allergies are doing fine  - Voice is maybe a little worse, not all that bothered  - Breathing, swallowing are doing fine  - No new PMH/PSH, but does have upcoming Mohs surgery for basal cell carcinomas of the skin (nose)      MEDICATIONS:     Current Outpatient Medications   Medication Sig Dispense Refill    aspirin 81 MG tablet Take 1 tablet by mouth daily       atorvastatin (LIPITOR) 20 MG tablet TAKE 1 TABLET BY MOUTH EVERY DAY (Patient taking differently: Take 20 mg by mouth daily.) 90 tablet 3    donepezil (ARICEPT) 10 MG tablet At Bedtime       doxycycline hyclate (VIBRAMYCIN) 100 MG capsule Take 1 capsule (100 mg) by mouth 2 times daily 5 capsule 0    escitalopram (LEXAPRO) 20 MG tablet Take 20 mg by mouth daily       fluticasone (FLONASE) 50 MCG/ACT nasal spray Spray 1 spray into both nostrils daily 48 g 0    GARLIC OIL PO Take by mouth daily       KRILL OIL PO Take by mouth daily       metroNIDAZOLE (METROGEL) 1 % gel Apply 1 g topically daily apply hs for Rosacea 60 g 11    Multiple Vitamin (DAILY MULTIVITAMIN PO) Take 1 tablet by mouth 2 times daily      nortriptyline (PAMELOR) 10 MG capsule Take 10 mg by mouth at bedtime      Omega-3 Fatty Acids (OMEGA-3 FISH OIL PO) Take by mouth daily       QUEtiapine (SEROQUEL) 25 MG tablet Take 25 mg by mouth daily      traZODone (DESYREL) 50 MG tablet Take 50 mg by mouth at bedtime.      memantine (NAMENDA) 10 MG tablet Take 10 mg by mouth 2 times daily.         ALLERGIES:    Allergies   Allergen Reactions    Amoxicillin Hives              NEW PMH/PSH: As above    REVIEW OF SYSTEMS:  The patient completed a comprehensive 11 point review of systems (below), which was reviewed. Positives are as noted below.  Patient Supplied Answers to Review of Systems Noncontributory     PHYSICAL EXAM:  General: The patient was alert and conversant, and in no acute distress. Patient accompanied by his spouse.  Oral cavity/oropharynx: No masses or lesions. Dentition unchanged since prior. Tongue mobility and palate elevation intact and symmetric.  Neck: No palpable cervical lymphadenopathy, no significant tenderness to palpation of the thyrohyoid space, which was narrow. No obvious thyroid abnormality.  Resp: Breathing comfortably, no stridor or stertor.  Neuro: Symmetric facial function. Other cranial nerve function as documented above.  Psych: Normal affect, pleasant and cooperative. Having some word-finding difficulty today, and sometimes had some slightly non-linear responses.  Voice/speech: Mild dysphonia characterized by breathiness, roughness, and strain.      Procedure:   Flexible fiberoptic laryngoscopy  Indications: This procedure was warranted to evaluate the patient's laryngeal anatomy and  function. Risks, benefits, and alternatives were discussed.  Description: After written informed consent was obtained, a time-out was performed to confirm patient identity, procedure, and procedure site. Topical 2% lidocaine and oxymetazoline were applied to the nasal cavities. I performed the endoscopy and no complications were apparent.  Performed by: Betty Mitchell MD MPH  Findings: Normal nasopharynx. Normal base of tongue, valleculae, and epiglottis. The right true vocal fold demonstrated normal mobility. The left true vocal fold demonstrated normal mobility. Stable posterior web. The medial edges of the true vocal folds appeared smooth overall. Right posterior true vocal fold with cluster of papilloma, not affecting airway patency. Left true vocal fold with presumed mild sessile papilloma. Stable anterior glottic web. Glissade was not assessable due to difficulty completing tasks. There was mild to moderate supraglottic recruitment with connected speech. Mucosa of the false vocal folds, aryepiglottic folds, piriform sinuses, and posterior glottis unremarkable except for cluster of papilloma along mid posterior glottis, non-obstructive. Airway was patent. Similar findings on virtual chromoendoscopy, papilloma highlighted. Stroboscopy was not performed due to limited patient tolerance.                              IMPRESSION AND PLAN:   Cleveland Rodríguez returns with an essentially stable exam; there is perhaps a little more prominent papilloma along the right posterior and mid posterior larynx, but this is not significantly affecting his voice or breathing. Given his significant dementia we are aiming to take as noninvasive an approach as possible.     Plan:  1) RTC 6 months or sooner as needed; encouraged his wife to reach out to as needed too. If his voice and/or breathing worsen, we will consider planning for a repeat MDL.  2) Could consider voice therapy; he is open to considering it more in the future  if needed.  3) Encouraged them again to discuss his sleep symptoms with his primary care provider. He was not interested in discussing a sleep study at our visit.    I spent a total of 24 minutes on 3/10/2025 in chart review, review of tests, patient visit, documentation, care coordination, and/or discussion with other providers about the issues documented above, separate from any documented procedure(s).

## 2025-04-16 ENCOUNTER — TELEPHONE (OUTPATIENT)
Dept: OTOLARYNGOLOGY | Facility: CLINIC | Age: 70
End: 2025-04-16
Payer: COMMERCIAL

## (undated) DEVICE — ENDO TOOTH QUICK GUARD CONFORMING PROTECTION

## (undated) DEVICE — SOL WATER IRRIG 1000ML BOTTLE 2F7114

## (undated) DEVICE — SPECIMEN CONTAINER W/10% BUFFERED FORMALIN 120ML 591201

## (undated) DEVICE — SYR 10ML LL W/O NDL

## (undated) DEVICE — SPONGE COTTONOID 1/2X1 1/2" 1-STRING 80-1404

## (undated) DEVICE — NDL BLUNT 15GA 1.5"

## (undated) DEVICE — NDL BLUNT 18GA 1.5" W/O FILTER 305180

## (undated) DEVICE — SPONGE COTTONOID 1/2X1/2" 20-04S

## (undated) DEVICE — TUBING SUCTION 10'X3/16" N510

## (undated) DEVICE — PACK ENT ENDOSCOPY UMMC

## (undated) DEVICE — SPONGE COTTONOID 1/4X1/4" 80-1399

## (undated) DEVICE — LINEN TOWEL PACK X5 5464

## (undated) DEVICE — BASIN SET SINGLE STERILE 13752-624

## (undated) DEVICE — SOL NACL 0.9% 10ML VIAL 0409-4888-02

## (undated) DEVICE — SYR PISTON URETHRAL 60ML 68000

## (undated) DEVICE — ESU GROUND PAD ADULT W/CORD E7507

## (undated) DEVICE — TUBING SMOKE EVAC .635CMX3M SEA3705

## (undated) DEVICE — DRAPE U SPLIT 74X120" 29440

## (undated) DEVICE — GLOVE PROTEXIS POWDER FREE SMT 6.5  2D72PT65X

## (undated) DEVICE — SPONGE RAY-TEC 4X8" 7318

## (undated) DEVICE — GLOVE BIOGEL PI ULTRATOUCH G SZ 6.5 42165

## (undated) DEVICE — NDL YUEH CENTESIS 5FRX15CM DTVN-5.0-19-15.0-YUEH

## (undated) DEVICE — SYR 03ML LL W/O NDL 309657

## (undated) DEVICE — SUCTION MANIFOLD NEPTUNE 2 SYS 1 PORT 702-025-000

## (undated) DEVICE — Device

## (undated) DEVICE — LASER FIBER 400 BAREFLAT DBLF-40

## (undated) DEVICE — DRSG TELFA 3X8" 1238

## (undated) DEVICE — JELLY LUBRICATING SURGILUBE 2OZ TUBE

## (undated) DEVICE — SOL NACL 0.9% IRRIG 1000ML BOTTLE 2F7124

## (undated) DEVICE — DRSG GAUZE 4X4" TRAY 6939

## (undated) DEVICE — TUBE ENDOTRACHEAL 8.0MMX34CM LASER CUFFED FLEXIBLE TG0050-S

## (undated) DEVICE — SYR 10ML SLIP TIP W/O NDL 303134

## (undated) DEVICE — SUCTION TIP YANKAUER W/O VENT BULBOUS TIP

## (undated) DEVICE — TAPE DURAPORE 1"X1.5YD SILK 1538S-1

## (undated) DEVICE — SPONGE COTTONOID NEURO 1/2"X1/2" 30-054

## (undated) DEVICE — SPONGE COTTONOID 1/2X1/2" 80-1400

## (undated) DEVICE — EYE DRSG PAD OVAL

## (undated) DEVICE — GOWN SM/MED DISP 9505

## (undated) DEVICE — SUCTION MANIFOLD NEPTUNE 2 SYS 4 PORT 0702-020-000

## (undated) DEVICE — PACK ENT ENDOSCOPY CUSTOM ASC

## (undated) DEVICE — NDL COUNTER 20CT 31142493

## (undated) DEVICE — DRSG EYE PAD STERILE 1.63X2.63" NON21600

## (undated) DEVICE — CONNECTOR OMNI-FLEX 3222

## (undated) DEVICE — SPONGE COTTONOID 1/2X3" 80-1407

## (undated) DEVICE — TAPE DURAPORE 1" SILK 1538-1

## (undated) RX ORDER — ONDANSETRON 2 MG/ML
INJECTION INTRAMUSCULAR; INTRAVENOUS
Status: DISPENSED
Start: 2017-10-26

## (undated) RX ORDER — GLYCOPYRROLATE 0.2 MG/ML
INJECTION, SOLUTION INTRAMUSCULAR; INTRAVENOUS
Status: DISPENSED
Start: 2024-02-01

## (undated) RX ORDER — FENTANYL CITRATE 50 UG/ML
INJECTION, SOLUTION INTRAMUSCULAR; INTRAVENOUS
Status: DISPENSED
Start: 2021-10-07

## (undated) RX ORDER — FENTANYL CITRATE 50 UG/ML
INJECTION, SOLUTION INTRAMUSCULAR; INTRAVENOUS
Status: DISPENSED
Start: 2017-10-26

## (undated) RX ORDER — ONDANSETRON 2 MG/ML
INJECTION INTRAMUSCULAR; INTRAVENOUS
Status: DISPENSED
Start: 2024-02-01

## (undated) RX ORDER — DEXAMETHASONE SODIUM PHOSPHATE 4 MG/ML
INJECTION, SOLUTION INTRA-ARTICULAR; INTRALESIONAL; INTRAMUSCULAR; INTRAVENOUS; SOFT TISSUE
Status: DISPENSED
Start: 2024-02-01

## (undated) RX ORDER — DEXAMETHASONE SODIUM PHOSPHATE 10 MG/ML
INJECTION, SOLUTION INTRAMUSCULAR; INTRAVENOUS
Status: DISPENSED
Start: 2017-10-26

## (undated) RX ORDER — LIDOCAINE HYDROCHLORIDE 20 MG/ML
INJECTION, SOLUTION EPIDURAL; INFILTRATION; INTRACAUDAL; PERINEURAL
Status: DISPENSED
Start: 2017-10-26

## (undated) RX ORDER — PROPOFOL 10 MG/ML
INJECTION, EMULSION INTRAVENOUS
Status: DISPENSED
Start: 2017-10-26

## (undated) RX ORDER — LIDOCAINE HYDROCHLORIDE 40 MG/ML
SOLUTION TOPICAL
Status: DISPENSED
Start: 2017-10-26

## (undated) RX ORDER — FENTANYL CITRATE 50 UG/ML
INJECTION, SOLUTION INTRAMUSCULAR; INTRAVENOUS
Status: DISPENSED
Start: 2024-02-01

## (undated) RX ORDER — EPINEPHRINE 1 MG/ML
INJECTION, SOLUTION, CONCENTRATE INTRAVENOUS
Status: DISPENSED
Start: 2017-10-26

## (undated) RX ORDER — ESMOLOL HYDROCHLORIDE 10 MG/ML
INJECTION INTRAVENOUS
Status: DISPENSED
Start: 2024-02-01

## (undated) RX ORDER — LIDOCAINE HYDROCHLORIDE 20 MG/ML
INJECTION, SOLUTION EPIDURAL; INFILTRATION; INTRACAUDAL; PERINEURAL
Status: DISPENSED
Start: 2019-09-06

## (undated) RX ORDER — PHENYLEPHRINE HCL IN 0.9% NACL 1 MG/10 ML
SYRINGE (ML) INTRAVENOUS
Status: DISPENSED
Start: 2017-10-26

## (undated) RX ORDER — OXYMETAZOLINE HYDROCHLORIDE 0.05 G/100ML
SPRAY NASAL
Status: DISPENSED
Start: 2021-10-07

## (undated) RX ORDER — LIDOCAINE HYDROCHLORIDE 10 MG/ML
INJECTION, SOLUTION EPIDURAL; INFILTRATION; INTRACAUDAL; PERINEURAL
Status: DISPENSED
Start: 2021-10-07

## (undated) RX ORDER — FENTANYL CITRATE-0.9 % NACL/PF 10 MCG/ML
PLASTIC BAG, INJECTION (ML) INTRAVENOUS
Status: DISPENSED
Start: 2024-02-01

## (undated) RX ORDER — LIDOCAINE HYDROCHLORIDE 20 MG/ML
INJECTION, SOLUTION EPIDURAL; INFILTRATION; INTRACAUDAL; PERINEURAL
Status: DISPENSED
Start: 2021-10-07

## (undated) RX ORDER — ONDANSETRON 2 MG/ML
INJECTION INTRAMUSCULAR; INTRAVENOUS
Status: DISPENSED
Start: 2021-10-07

## (undated) RX ORDER — CLINDAMYCIN PHOSPHATE 900 MG/50ML
INJECTION, SOLUTION INTRAVENOUS
Status: DISPENSED
Start: 2024-02-01

## (undated) RX ORDER — PROPOFOL 10 MG/ML
INJECTION, EMULSION INTRAVENOUS
Status: DISPENSED
Start: 2021-10-07

## (undated) RX ORDER — PROPOFOL 10 MG/ML
INJECTION, EMULSION INTRAVENOUS
Status: DISPENSED
Start: 2024-02-01

## (undated) RX ORDER — EPINEPHRINE 0.1 MG/ML
INJECTION INTRAVENOUS
Status: DISPENSED
Start: 2024-02-01

## (undated) RX ORDER — OXYCODONE HYDROCHLORIDE 5 MG/1
TABLET ORAL
Status: DISPENSED
Start: 2017-10-26

## (undated) RX ORDER — DEXAMETHASONE SODIUM PHOSPHATE 4 MG/ML
INJECTION, SOLUTION INTRA-ARTICULAR; INTRALESIONAL; INTRAMUSCULAR; INTRAVENOUS; SOFT TISSUE
Status: DISPENSED
Start: 2017-10-26

## (undated) RX ORDER — CLINDAMYCIN PHOSPHATE 900 MG/50ML
INJECTION, SOLUTION INTRAVENOUS
Status: DISPENSED
Start: 2017-10-26

## (undated) RX ORDER — ACETAMINOPHEN 325 MG/1
TABLET ORAL
Status: DISPENSED
Start: 2017-10-26

## (undated) RX ORDER — DEXAMETHASONE SODIUM PHOSPHATE 4 MG/ML
INJECTION, SOLUTION INTRA-ARTICULAR; INTRALESIONAL; INTRAMUSCULAR; INTRAVENOUS; SOFT TISSUE
Status: DISPENSED
Start: 2021-10-07

## (undated) RX ORDER — GABAPENTIN 300 MG/1
CAPSULE ORAL
Status: DISPENSED
Start: 2017-10-26

## (undated) RX ORDER — GLYCOPYRROLATE 0.2 MG/ML
INJECTION, SOLUTION INTRAMUSCULAR; INTRAVENOUS
Status: DISPENSED
Start: 2021-10-07

## (undated) RX ORDER — LIDOCAINE HYDROCHLORIDE 20 MG/ML
INJECTION, SOLUTION INFILTRATION; PERINEURAL
Status: DISPENSED
Start: 2018-10-19

## (undated) RX ORDER — EPINEPHRINE IN SOD CHLOR,ISO 1 MG/10 ML
SYRINGE (ML) INTRAVENOUS
Status: DISPENSED
Start: 2021-10-07